# Patient Record
Sex: MALE | Race: WHITE | Employment: OTHER | URBAN - METROPOLITAN AREA
[De-identification: names, ages, dates, MRNs, and addresses within clinical notes are randomized per-mention and may not be internally consistent; named-entity substitution may affect disease eponyms.]

---

## 2017-01-16 RX ORDER — TRIAMTERENE AND HYDROCHLOROTHIAZIDE 37.5; 25 MG/1; MG/1
CAPSULE ORAL
Qty: 90 CAP | Refills: 0 | Status: SHIPPED | OUTPATIENT
Start: 2017-01-16 | End: 2017-03-21 | Stop reason: SDUPTHER

## 2017-01-19 RX ORDER — TRIAMTERENE AND HYDROCHLOROTHIAZIDE 37.5; 25 MG/1; MG/1
CAPSULE ORAL
Qty: 90 CAP | Refills: 0 | Status: SHIPPED | OUTPATIENT
Start: 2017-01-19 | End: 2017-04-05 | Stop reason: SDUPTHER

## 2017-01-31 RX ORDER — ZOLPIDEM TARTRATE 10 MG/1
TABLET ORAL
Qty: 30 TAB | Refills: 0 | OUTPATIENT
Start: 2017-01-31 | End: 2017-03-01 | Stop reason: SDUPTHER

## 2017-02-06 RX ORDER — ATORVASTATIN CALCIUM 20 MG/1
TABLET, FILM COATED ORAL
Qty: 90 TAB | Refills: 0 | Status: SHIPPED | OUTPATIENT
Start: 2017-02-06 | End: 2017-02-07 | Stop reason: SDUPTHER

## 2017-02-07 RX ORDER — ATORVASTATIN CALCIUM 20 MG/1
TABLET, FILM COATED ORAL
Qty: 90 TAB | Refills: 0 | Status: SHIPPED | OUTPATIENT
Start: 2017-02-07 | End: 2017-05-04 | Stop reason: SDUPTHER

## 2017-03-01 RX ORDER — ZOLPIDEM TARTRATE 10 MG/1
TABLET ORAL
Qty: 30 TAB | Refills: 0 | OUTPATIENT
Start: 2017-03-01 | End: 2017-04-01 | Stop reason: SDUPTHER

## 2017-03-09 DIAGNOSIS — E78.5 HYPERLIPIDEMIA LDL GOAL <100: ICD-10-CM

## 2017-03-09 DIAGNOSIS — E11.9 TYPE 2 DIABETES MELLITUS WITHOUT COMPLICATION, WITHOUT LONG-TERM CURRENT USE OF INSULIN (HCC): ICD-10-CM

## 2017-03-14 ENCOUNTER — HOSPITAL ENCOUNTER (OUTPATIENT)
Dept: LAB | Age: 72
Discharge: HOME | End: 2017-03-14

## 2017-03-14 PROCEDURE — 99001 SPECIMEN HANDLING PT-LAB: CPT | Performed by: INTERNAL MEDICINE

## 2017-03-15 LAB
ALBUMIN SERPL-MCNC: 4.3 G/DL (ref 3.5–4.8)
ALBUMIN/GLOB SERPL: 1.7 {RATIO} (ref 1.2–2.2)
ALP SERPL-CCNC: 48 IU/L (ref 39–117)
ALT SERPL-CCNC: 17 IU/L (ref 0–44)
AST SERPL-CCNC: 12 IU/L (ref 0–40)
BILIRUB SERPL-MCNC: 0.5 MG/DL (ref 0–1.2)
BUN SERPL-MCNC: 24 MG/DL (ref 8–27)
BUN/CREAT SERPL: 26 (ref 10–22)
CALCIUM SERPL-MCNC: 9.8 MG/DL (ref 8.6–10.2)
CHLORIDE SERPL-SCNC: 99 MMOL/L (ref 96–106)
CHOLEST SERPL-MCNC: 144 MG/DL (ref 100–199)
CO2 SERPL-SCNC: 25 MMOL/L (ref 18–29)
CREAT SERPL-MCNC: 0.92 MG/DL (ref 0.76–1.27)
EST. AVERAGE GLUCOSE BLD GHB EST-MCNC: 163 MG/DL
GLOBULIN SER CALC-MCNC: 2.6 G/DL (ref 1.5–4.5)
GLUCOSE SERPL-MCNC: 145 MG/DL (ref 65–99)
HBA1C MFR BLD: 7.3 % (ref 4.8–5.6)
HDLC SERPL-MCNC: 44 MG/DL
INTERPRETATION, 910389: NORMAL
LDLC SERPL CALC-MCNC: 79 MG/DL (ref 0–99)
POTASSIUM SERPL-SCNC: 4.9 MMOL/L (ref 3.5–5.2)
PROT SERPL-MCNC: 6.9 G/DL (ref 6–8.5)
SODIUM SERPL-SCNC: 143 MMOL/L (ref 134–144)
TRIGL SERPL-MCNC: 105 MG/DL (ref 0–149)
VLDLC SERPL CALC-MCNC: 21 MG/DL (ref 5–40)

## 2017-03-21 ENCOUNTER — OFFICE VISIT (OUTPATIENT)
Dept: INTERNAL MEDICINE CLINIC | Age: 72
End: 2017-03-21

## 2017-03-21 VITALS
SYSTOLIC BLOOD PRESSURE: 136 MMHG | TEMPERATURE: 97.7 F | OXYGEN SATURATION: 98 % | DIASTOLIC BLOOD PRESSURE: 80 MMHG | HEIGHT: 72 IN | HEART RATE: 62 BPM | RESPIRATION RATE: 12 BRPM | BODY MASS INDEX: 33.4 KG/M2 | WEIGHT: 246.6 LBS

## 2017-03-21 DIAGNOSIS — I10 ESSENTIAL HYPERTENSION WITH GOAL BLOOD PRESSURE LESS THAN 140/90: ICD-10-CM

## 2017-03-21 DIAGNOSIS — E11.9 TYPE 2 DIABETES MELLITUS WITHOUT COMPLICATION, WITHOUT LONG-TERM CURRENT USE OF INSULIN (HCC): Primary | ICD-10-CM

## 2017-03-21 DIAGNOSIS — E78.5 HYPERLIPIDEMIA LDL GOAL <100: ICD-10-CM

## 2017-03-21 NOTE — PROGRESS NOTES
Rosette Heaton 1945, is a 80 y.o. male, who is seen today for reevaluation of diabetes hyperlipidemia hypertension obesity. He feels well. He is following his diet quite well with avoidance of sweets and is eating reasonable amounts of starch. He takes his medicine regularly. Past Medical History:   Diagnosis Date    Diabetes ([facility] [address]     HLD (hyperlipidemia)     Hypertension     Obesity     Sleep apnea      Current Outpatient Prescriptions   Medication Sig Dispense Refill    zolpidem (AMBIEN) 10 mg tablet TAKE ONE TABLET BY MOUTH NIGHTLY. MAX  DAILY  AMOUNT  10MG 30 Tab 0    atorvastatin (LIPITOR) 20 mg tablet TAKE ONE TABLET BY MOUTH ONCE DAILY IN THE EVENING 90 Tab 0    triamterene-hydroCHLOROthiazide (DYAZIDE) 37.5-25 mg per capsule TAKE ONE CAPSULE BY MOUTH ONCE DAILY 90 Cap 0    metFORMIN ER (GLUCOPHAGE XR) 500 mg tablet 2 tablets by mouth twice daily with meals 360 Tab 3    glucose blood VI test strips (ONETOUCH ULTRA TEST) strip Pt to test blood sugar once daily. Dx: E11.9  Dispense # 100 100 Strip 3    Lancets (ONE TOUCH ULTRASOFT LANCETS) Misc Patient tests one to two times daily or as directed by physician. Dx: 250.00 1 Package 11     Visit Vitals    /80    Pulse 62    Temp 97.7 °F (36.5 °C) (Oral)    Resp 12    Ht 6' (1.829 m)    Wt 246 lb 9.6 oz (111.9 kg)    SpO2 98%    BMI 33.44 kg/m2     Carotids are 2+ without bruits. Lungs are clear to percussion. Good breath sounds with no wheezing or crackles. Heart reveals a regular rhythm with normal S1 and S2 no murmur gallop click or rub. Apical impulse is not palpable. Abdomen is obese soft nontender with no hepatosplenomegaly or masses and no bruits. Extremities reveal no clubbing cyanosis or edema. Pulses are 2+.     Results for orders placed or performed in visit on 11/20/54   METABOLIC PANEL, COMPREHENSIVE   Result Value Ref Range    Glucose 145 (H) 65 - 99 mg/dL    BUN 24 8 - 27 mg/dL    Creatinine 0.92 0.76 - 1.27 mg/dL    GFR est non-AA 84 >59 mL/min/1.73    GFR est AA 97 >59 mL/min/1.73    BUN/Creatinine ratio 26 (H) 10 - 22    Sodium 143 134 - 144 mmol/L    Potassium 4.9 3.5 - 5.2 mmol/L    Chloride 99 96 - 106 mmol/L    CO2 25 18 - 29 mmol/L    Calcium 9.8 8.6 - 10.2 mg/dL    Protein, total 6.9 6.0 - 8.5 g/dL    Albumin 4.3 3.5 - 4.8 g/dL    GLOBULIN, TOTAL 2.6 1.5 - 4.5 g/dL    A-G Ratio 1.7 1.2 - 2.2    Bilirubin, total 0.5 0.0 - 1.2 mg/dL    Alk. phosphatase 48 39 - 117 IU/L    AST (SGOT) 12 0 - 40 IU/L    ALT (SGPT) 17 0 - 44 IU/L   LIPID PANEL   Result Value Ref Range    Cholesterol, total 144 100 - 199 mg/dL    Triglyceride 105 0 - 149 mg/dL    HDL Cholesterol 44 >39 mg/dL    VLDL, calculated 21 5 - 40 mg/dL    LDL, calculated 79 0 - 99 mg/dL   HEMOGLOBIN A1C   Result Value Ref Range    Hemoglobin A1c 7.3 (H) 4.8 - 5.6 %    Estimated average glucose 163 mg/dL   CVD REPORT   Result Value Ref Range    INTERPRETATION Note      Assessment: #1.  Diabetes better controlled. He will continue diabetic diet and work on weight loss. He will continue metformin ER 1000 mg twice daily. #2. Hyperlipidemia doing well. He will continue atorvastatin 20 mg each evening. #3. Hypertension controlled, he will continue Dyazide 1 daily. #4.  Obesity with body mass index 33.4, he is to continue working on weight loss through diet and any activity that he is able to continue to do. #5. Insomnia doing well, he will continue zolpidem 10 mg as needed. #6.  Obstructive sleep apnea doing well with CPAP. Follow-up 3 months with complete lab, we will do a complete evaluation/physical in a 15 minute slot. Eron Castle MD FACP    Please note: This document has been produced using voice recognition software. Unrecognized errors in transcription may be present.

## 2017-03-21 NOTE — MR AVS SNAPSHOT
Visit Information Date & Time Provider Department Dept. Phone Encounter #  
 3/21/2017  8:30 AM Ang Lobato MD Internist of [address] [place] [address] [id number] Your Appointments 6/23/2017  9:45 AM  
LAB with [facility] NURSE VISIT Internist of [facility] ([facility]) [address], [address] [facility] [address]  
  
   
 [address], [address]  
  
    
 6/27/2017  8:15 AM  
Office Visit with Ang Lobato MD  
Internist of [address] [place] [address] [facility] Appt Note: 3 month f/u  
 [address] [facility], [address] [address] [address]  
  
   
 [address], [address] Upcoming Health Maintenance Date Due DTaP/Tdap/Td series (1 - Tdap) 8/21/1967 MEDICARE YEARLY EXAM 2/23/2017 FOOT EXAM Q1 2/23/2017 MICROALBUMIN Q1 5/16/2017 EYE EXAM RETINAL OR DILATED Q1 9/6/2017 HEMOGLOBIN A1C Q6M 9/14/2017 LIPID PANEL Q1 3/14/2018 COLONOSCOPY 4/30/2018 GLAUCOMA SCREENING Q2Y 9/6/2018 Allergies as of 3/21/2017  Review Complete On: 3/21/2017 By: Ang Lobato MD  
  
 Severity Noted Reaction Type Reactions Ace Inhibitors Medium  Side Effect Cough Current Immunizations  Reviewed on 12/16/2016 Name Date Influenza High Dose Vaccine PF 9/6/2016, 10/1/2015 Influenza Vaccine 10/6/2014, 10/6/2014, 10/1/2013 Pneumococcal Polysaccharide (PPSV-23) 2/10/2014 Zoster Vaccine, Live 8/11/2014  9:04 AM  
  
 Not reviewed this visit You Were Diagnosed With   
  
 Codes Comments Type 2 diabetes mellitus without complication, without long-term current use of insulin (HCC)    -  Primary ICD-10-CM: E11.9 ICD-9-CM: 250.00 Essential hypertension with goal blood pressure less than 140/90     ICD-10-CM: I10 
ICD-9-CM: 401.9 Hyperlipidemia LDL goal <100     ICD-10-CM: E78.5 ICD-9-CM: 272.4 Vitals BP Pulse Temp Resp Height(growth percentile) Weight(growth percentile) 136/80 62 97.7 °F (36.5 °C) (Oral) 12 6' (1.829 m) 246 lb 9.6 oz (111.9 kg) SpO2 BMI Smoking Status 98% 33.44 kg/m2 Former Smoker Vitals History BMI and BSA Data Body Mass Index Body Surface Area  
 33.44 kg/m 2 2.38 m 2 Preferred Pharmacy Pharmacy Name Phone [facility] [address] [place], [address] Your Updated Medication List  
  
   
This list is accurate as of: 3/21/17  9:06 AM.  Always use your most recent med list.  
  
  
  
  
 atorvastatin 20 mg tablet Commonly known as:  LIPITOR  
TAKE ONE TABLET BY MOUTH ONCE DAILY IN THE EVENING  
  
 glucose blood VI test strips strip Commonly known as:  ONETOUCH ULTRA TEST Pt to test blood sugar once daily. Dx: E11.9  Dispense # 100 Lancets Misc Commonly known as:  ONETOUCH ULTRASOFT LANCETS Patient tests one to two times daily or as directed by physician. Dx: 250.00  
  
 metFORMIN  mg tablet Commonly known as:  GLUCOPHAGE XR  
2 tablets by mouth twice daily with meals  
  
 triamterene-hydroCHLOROthiazide 37.5-25 mg per capsule Commonly known as:  Darene [place] TAKE ONE CAPSULE BY MOUTH ONCE DAILY  
  
 zolpidem 10 mg tablet Commonly known as:  AMBIEN  
TAKE ONE TABLET BY MOUTH NIGHTLY. MAX  DAILY  AMOUNT  10MG To-Do List   
 Around 06/14/2017 Lab:  CBC WITH AUTOMATED DIFF Around 06/14/2017 Lab:  HEMOGLOBIN A1C WITH EAG Around 06/14/2017 Lab:  LIPID PANEL Around 06/14/2017 Lab:  METABOLIC PANEL, COMPREHENSIVE Around 06/14/2017 Lab:  MICROALBUMIN, UR, RAND W/ MICROALBUMIN/CREA RATIO Around 06/14/2017 Lab:  T4, FREE Around 06/14/2017 Lab:  TSH 3RD GENERATION Around 06/14/2017 Lab:  URINALYSIS W/ RFLX MICROSCOPIC Introducing hospitals & HEALTH SERVICES! Dear Flo Johnson: Thank you for requesting a [employer] account. Our records indicate that you already have an active [employer] account. You can access your account anytime at [email or web address] Did you know that you can access your hospital and ER discharge instructions at any time in [employer]? You can also review all of your test results from your hospital stay or ER visit. Additional Information If you have questions, please visit the Frequently Asked Questions section of the [employer] website at [email or web address] Remember, [employer] is NOT to be used for urgent needs. For medical emergencies, dial 911. Now available from your iPhone and Android! Please provide this summary of care documentation to your next provider. Your primary care clinician is listed as Morene Hodgkin. Caridad Hoar. If you have any questions after today's visit, please call [phone].

## 2017-04-03 RX ORDER — ZOLPIDEM TARTRATE 10 MG/1
TABLET ORAL
Qty: 30 TAB | Refills: 0 | OUTPATIENT
Start: 2017-04-03 | End: 2017-04-28 | Stop reason: SDUPTHER

## 2017-04-03 NOTE — TELEPHONE ENCOUNTER
Last filled 3/1/17  Reviewed report generated by the [facility]. Does not demonstrate aberrancies or inconsistencies with regard to the prescribing of controlled medications to this patient by other providers.

## 2017-04-05 RX ORDER — TRIAMTERENE AND HYDROCHLOROTHIAZIDE 37.5; 25 MG/1; MG/1
CAPSULE ORAL
Qty: 90 CAP | Refills: 0 | Status: SHIPPED | OUTPATIENT
Start: 2017-04-05 | End: 2017-10-16 | Stop reason: SDUPTHER

## 2017-04-28 RX ORDER — ZOLPIDEM TARTRATE 10 MG/1
TABLET ORAL
Qty: 30 TAB | Refills: 0 | OUTPATIENT
Start: 2017-04-28 | End: 2017-05-31 | Stop reason: SDUPTHER

## 2017-05-04 RX ORDER — ATORVASTATIN CALCIUM 20 MG/1
TABLET, FILM COATED ORAL
Qty: 90 TAB | Refills: 0 | Status: SHIPPED | OUTPATIENT
Start: 2017-05-04 | End: 2017-08-01 | Stop reason: SDUPTHER

## 2017-05-31 RX ORDER — ZOLPIDEM TARTRATE 10 MG/1
TABLET ORAL
Qty: 30 TAB | Refills: 0 | OUTPATIENT
Start: 2017-05-31 | End: 2017-07-05 | Stop reason: SDUPTHER

## 2017-05-31 NOTE — TELEPHONE ENCOUNTER
Pt is out of town and is in need of refill. Needs this to go to [place] which I have added to his demographics.

## 2017-06-14 DIAGNOSIS — E11.9 TYPE 2 DIABETES MELLITUS WITHOUT COMPLICATION, WITHOUT LONG-TERM CURRENT USE OF INSULIN (HCC): ICD-10-CM

## 2017-06-14 DIAGNOSIS — I10 ESSENTIAL HYPERTENSION WITH GOAL BLOOD PRESSURE LESS THAN 140/90: ICD-10-CM

## 2017-06-14 DIAGNOSIS — E78.5 HYPERLIPIDEMIA LDL GOAL <100: ICD-10-CM

## 2017-06-14 RX ORDER — METFORMIN HYDROCHLORIDE 500 MG/1
TABLET, EXTENDED RELEASE ORAL
Qty: 360 TAB | Refills: 0 | Status: SHIPPED | OUTPATIENT
Start: 2017-06-14 | End: 2017-09-13 | Stop reason: SDUPTHER

## 2017-06-23 ENCOUNTER — LAB ONLY (OUTPATIENT)
Dept: INTERNAL MEDICINE CLINIC | Age: 72
End: 2017-06-23

## 2017-06-23 ENCOUNTER — HOSPITAL ENCOUNTER (OUTPATIENT)
Dept: LAB | Age: 72
Discharge: HOME | End: 2017-06-23

## 2017-06-23 DIAGNOSIS — E78.5 HYPERLIPIDEMIA LDL GOAL <100: ICD-10-CM

## 2017-06-23 DIAGNOSIS — I10 ESSENTIAL HYPERTENSION WITH GOAL BLOOD PRESSURE LESS THAN 140/90: ICD-10-CM

## 2017-06-23 DIAGNOSIS — E11.9 TYPE 2 DIABETES MELLITUS WITHOUT COMPLICATION, WITHOUT LONG-TERM CURRENT USE OF INSULIN (HCC): Primary | ICD-10-CM

## 2017-06-23 PROCEDURE — 99001 SPECIMEN HANDLING PT-LAB: CPT | Performed by: INTERNAL MEDICINE

## 2017-06-24 LAB
ALBUMIN SERPL-MCNC: 4.3 G/DL (ref 3.5–4.8)
ALBUMIN/CREAT UR: 22.9 MG/G CREAT (ref 0–30)
ALBUMIN/GLOB SERPL: 1.7 {RATIO} (ref 1.2–2.2)
ALP SERPL-CCNC: 55 IU/L (ref 39–117)
ALT SERPL-CCNC: 18 IU/L (ref 0–44)
APPEARANCE UR: ABNORMAL
AST SERPL-CCNC: 15 IU/L (ref 0–40)
BASOPHILS # BLD AUTO: 0 X10E3/UL (ref 0–0.2)
BASOPHILS NFR BLD AUTO: 1 %
BILIRUB SERPL-MCNC: 0.3 MG/DL (ref 0–1.2)
BILIRUB UR QL STRIP: NEGATIVE
BUN SERPL-MCNC: 25 MG/DL (ref 8–27)
BUN/CREAT SERPL: 27 (ref 10–24)
CALCIUM SERPL-MCNC: 9.4 MG/DL (ref 8.6–10.2)
CHLORIDE SERPL-SCNC: 100 MMOL/L (ref 96–106)
CHOLEST SERPL-MCNC: 128 MG/DL (ref 100–199)
CO2 SERPL-SCNC: 24 MMOL/L (ref 18–29)
COLOR UR: YELLOW
CREAT SERPL-MCNC: 0.91 MG/DL (ref 0.76–1.27)
CREAT UR-MCNC: 80.7 MG/DL
EOSINOPHIL # BLD AUTO: 0.3 X10E3/UL (ref 0–0.4)
EOSINOPHIL NFR BLD AUTO: 5 %
ERYTHROCYTE [DISTWIDTH] IN BLOOD BY AUTOMATED COUNT: 14.9 % (ref 12.3–15.4)
EST. AVERAGE GLUCOSE BLD GHB EST-MCNC: 157 MG/DL
GLOBULIN SER CALC-MCNC: 2.6 G/DL (ref 1.5–4.5)
GLUCOSE SERPL-MCNC: 162 MG/DL (ref 65–99)
GLUCOSE UR QL: ABNORMAL
HBA1C MFR BLD: 7.1 % (ref 4.8–5.6)
HCT VFR BLD AUTO: 42.3 % (ref 37.5–51)
HDLC SERPL-MCNC: 45 MG/DL
HGB BLD-MCNC: 14 G/DL (ref 12.6–17.7)
HGB UR QL STRIP: NEGATIVE
IMM GRANULOCYTES # BLD: 0 X10E3/UL (ref 0–0.1)
IMM GRANULOCYTES NFR BLD: 0 %
INTERPRETATION, 910389: NORMAL
KETONES UR QL STRIP: NEGATIVE
LDLC SERPL CALC-MCNC: 67 MG/DL (ref 0–99)
LEUKOCYTE ESTERASE UR QL STRIP: NEGATIVE
LYMPHOCYTES # BLD AUTO: 2.5 X10E3/UL (ref 0.7–3.1)
LYMPHOCYTES NFR BLD AUTO: 41 %
Lab: NORMAL
MCH RBC QN AUTO: 29 PG (ref 26.6–33)
MCHC RBC AUTO-ENTMCNC: 33.1 G/DL (ref 31.5–35.7)
MCV RBC AUTO: 88 FL (ref 79–97)
MICRO URNS: ABNORMAL
MICROALBUMIN UR-MCNC: 18.5 UG/ML
MONOCYTES # BLD AUTO: 0.4 X10E3/UL (ref 0.1–0.9)
MONOCYTES NFR BLD AUTO: 7 %
NEUTROPHILS # BLD AUTO: 2.9 X10E3/UL (ref 1.4–7)
NEUTROPHILS NFR BLD AUTO: 46 %
NITRITE UR QL STRIP: NEGATIVE
PH UR STRIP: 6 [PH] (ref 5–7.5)
PLATELET # BLD AUTO: 196 X10E3/UL (ref 150–379)
POTASSIUM SERPL-SCNC: 4.4 MMOL/L (ref 3.5–5.2)
PROT SERPL-MCNC: 6.9 G/DL (ref 6–8.5)
PROT UR QL STRIP: NEGATIVE
RBC # BLD AUTO: 4.83 X10E6/UL (ref 4.14–5.8)
SODIUM SERPL-SCNC: 142 MMOL/L (ref 134–144)
SP GR UR: 1.02 (ref 1–1.03)
T4 FREE SERPL-MCNC: 0.92 NG/DL (ref 0.82–1.77)
TRIGL SERPL-MCNC: 79 MG/DL (ref 0–149)
TSH SERPL DL<=0.005 MIU/L-ACNC: 2.5 UIU/ML (ref 0.45–4.5)
UROBILINOGEN UR STRIP-MCNC: 0.2 MG/DL (ref 0.2–1)
VLDLC SERPL CALC-MCNC: 16 MG/DL (ref 5–40)
WBC # BLD AUTO: 6.1 X10E3/UL (ref 3.4–10.8)

## 2017-06-27 ENCOUNTER — OFFICE VISIT (OUTPATIENT)
Dept: INTERNAL MEDICINE CLINIC | Age: 72
End: 2017-06-27

## 2017-06-27 ENCOUNTER — TELEPHONE (OUTPATIENT)
Dept: INTERNAL MEDICINE CLINIC | Age: 72
End: 2017-06-27

## 2017-06-27 VITALS
RESPIRATION RATE: 14 BRPM | OXYGEN SATURATION: 98 % | WEIGHT: 242.8 LBS | SYSTOLIC BLOOD PRESSURE: 132 MMHG | HEART RATE: 72 BPM | BODY MASS INDEX: 32.89 KG/M2 | DIASTOLIC BLOOD PRESSURE: 60 MMHG | TEMPERATURE: 98.2 F | HEIGHT: 72 IN

## 2017-06-27 DIAGNOSIS — G47.33 OBSTRUCTIVE SLEEP APNEA SYNDROME: ICD-10-CM

## 2017-06-27 DIAGNOSIS — E78.5 HYPERLIPIDEMIA LDL GOAL <100: ICD-10-CM

## 2017-06-27 DIAGNOSIS — E11.9 TYPE 2 DIABETES MELLITUS WITHOUT COMPLICATION, WITHOUT LONG-TERM CURRENT USE OF INSULIN (HCC): ICD-10-CM

## 2017-06-27 DIAGNOSIS — I10 ESSENTIAL HYPERTENSION WITH GOAL BLOOD PRESSURE LESS THAN 140/90: ICD-10-CM

## 2017-06-27 DIAGNOSIS — Z00.00 ROUTINE GENERAL MEDICAL EXAMINATION AT A HEALTH CARE FACILITY: Primary | ICD-10-CM

## 2017-06-27 NOTE — PROGRESS NOTES
Robbie Holman 1945, is a 80 y.o. male, who is seen today for routine physical exam and reevaluation of diabetes hyperlipidemia obesity obstructive sleep apnea hypertension. He feels well in general but he has developed jock itch again. Previously he was treated with antifungal and cortisone by the dermatologist and he would like a refill on that. He has not had a refill in several years. He tries to follow his diet and remains quite active doing some biking and walking. No chest pain or dyspnea. No pain in the legs with walking. He was unable to tolerate CPAP several years ago so he has not tried to do that again. He does sleep for about 4 hours with the help of zolpidem and is generally quite wide awake in the daytime, he never drives very far and never gets sleepy behind the wheel. He falls asleep in his recliner in the evening and then goes to bed a bit later. Past Medical History:   Diagnosis Date    Diabetes ([address]     HLD (hyperlipidemia)     Hypertension     Obesity     Sleep apnea      Past Surgical History:   Procedure Laterality Date    HX COLONOSCOPY  4/30/15    adenomatous polyps, 5 years. Dr. Lars Vanegas HX HEENT      tooth extraction     Current Outpatient Prescriptions   Medication Sig Dispense Refill    metFORMIN ER (GLUCOPHAGE XR) 500 mg tablet TAKE TWO TABLETS BY MOUTH TWICE DAILY WITH  MEALS 360 Tab 0    zolpidem (AMBIEN) 10 mg tablet TAKE ONE TABLET BY MOUTH ONCE NIGHTLY AT BEDTIME 30 Tab 0    atorvastatin (LIPITOR) 20 mg tablet TAKE ONE TABLET BY MOUTH ONCE DAILY IN THE EVENING 90 Tab 0    triamterene-hydroCHLOROthiazide (DYAZIDE) 37.5-25 mg per capsule TAKE ONE CAPSULE BY MOUTH ONCE DAILY 90 Cap 0    glucose blood VI test strips (ONETOUCH ULTRA TEST) strip Pt to test blood sugar once daily. Dx: E11.9  Dispense # 100 100 Strip 3    Lancets (ONE TOUCH ULTRASOFT LANCETS) Misc Patient tests one to two times daily or as directed by physician.  Dx: 250.00 1 Package 11     Allergies   Allergen Reactions    Ace Inhibitors Cough     Social History     Social History    Marital status:      Spouse name: N/A    Number of children: N/A    Years of education: N/A     Social History Main Topics    Smoking status: Former Smoker     Packs/day: 1.00     Years: 12.00     Quit date: 2/1/1978    Smokeless tobacco: Never Used    Alcohol use 0.0 oz/week     1 - 2 Cans of beer per week    Drug use: No    Sexual activity: Yes     Partners: Female     Other Topics Concern    None     Social History Narrative     Visit Vitals    /60    Pulse 72    Temp 98.2 °F (36.8 °C) (Oral)    Resp 14    Ht 6' (1.829 m)    Wt 242 lb 12.8 oz (110.1 kg)    SpO2 98%    BMI 32.93 kg/m2     The patient is a well-developed, well-nourished male in no apparent distress. HEENT: Pupils are equal and react to light and extraocular movements are full. Ear canals and tympanic membranes appear normal. Oral cavity appears normal with no oral lesions. Neck: Carotids are 2+ without bruits. No adenopathy or thyromegaly. Lungs are clear to percussion. I hear no wheezing, rales or rhonchi. Heart reveals a nondisplaced apical impulse in the fifth interspace at the midclavicular line. Rhythm is regular and no murmur, gallop, click or rub. Abdomen is soft and nontender with no hepatosplenomegaly or masses. No distention or tympany and normoactive bowel sounds. Extremities reveal no clubbing cyanosis or edema. The feet reveal no deformities ulcerations or calluses. Normal sensation to monofilament testing. Pulses are 2+ except absent dorsalis pedis pulses and 1+ posterior tibialis pulses. Normal external genitalia with no hernia. Rectal exam is normal.  The prostate is symmetric and smooth with no nodules. No suspicious skin growths.     Results for orders placed or performed in visit on 06/23/17   CBC WITH AUTOMATED DIFF   Result Value Ref Range    WBC 6.1 3.4 - 10.8 x10E3/uL    RBC 4.83 4.14 - 5.80 x10E6/uL    HGB 14.0 12.6 - 17.7 g/dL    HCT 42.3 37.5 - 51.0 %    MCV 88 79 - 97 fL    MCH 29.0 26.6 - 33.0 pg    MCHC 33.1 31.5 - 35.7 g/dL    RDW 14.9 12.3 - 15.4 %    PLATELET 723 487 - 005 x10E3/uL    NEUTROPHILS 46 %    Lymphocytes 41 %    MONOCYTES 7 %    EOSINOPHILS 5 %    BASOPHILS 1 %    ABS. NEUTROPHILS 2.9 1.4 - 7.0 x10E3/uL    Abs Lymphocytes 2.5 0.7 - 3.1 x10E3/uL    ABS. MONOCYTES 0.4 0.1 - 0.9 x10E3/uL    ABS. EOSINOPHILS 0.3 0.0 - 0.4 x10E3/uL    ABS. BASOPHILS 0.0 0.0 - 0.2 x10E3/uL    IMMATURE GRANULOCYTES 0 %    ABS. IMM. GRANS. 0.0 0.0 - 0.1 x10E3/uL   HEMOGLOBIN A1C WITH EAG   Result Value Ref Range    Hemoglobin A1c 7.1 (H) 4.8 - 5.6 %    Estimated average glucose 157 mg/dL   LIPID PANEL   Result Value Ref Range    Cholesterol, total 128 100 - 199 mg/dL    Triglyceride 79 0 - 149 mg/dL    HDL Cholesterol 45 >39 mg/dL    VLDL, calculated 16 5 - 40 mg/dL    LDL, calculated 67 0 - 99 mg/dL   METABOLIC PANEL, COMPREHENSIVE   Result Value Ref Range    Glucose 162 (H) 65 - 99 mg/dL    BUN 25 8 - 27 mg/dL    Creatinine 0.91 0.76 - 1.27 mg/dL    GFR est non-AA 85 >59 mL/min/1.73    GFR est AA 98 >59 mL/min/1.73    BUN/Creatinine ratio 27 (H) 10 - 24    Sodium 142 134 - 144 mmol/L    Potassium 4.4 3.5 - 5.2 mmol/L    Chloride 100 96 - 106 mmol/L    CO2 24 18 - 29 mmol/L    Calcium 9.4 8.6 - 10.2 mg/dL    Protein, total 6.9 6.0 - 8.5 g/dL    Albumin 4.3 3.5 - 4.8 g/dL    GLOBULIN, TOTAL 2.6 1.5 - 4.5 g/dL    A-G Ratio 1.7 1.2 - 2.2    Bilirubin, total 0.3 0.0 - 1.2 mg/dL    Alk.  phosphatase 55 39 - 117 IU/L    AST (SGOT) 15 0 - 40 IU/L    ALT (SGPT) 18 0 - 44 IU/L   URINALYSIS W/ RFLX MICROSCOPIC   Result Value Ref Range    Specific Gravity 1.019 1.005 - 1.030    pH (UA) 6.0 5.0 - 7.5    Color Yellow Yellow    Appearance Cloudy (A) Clear    Leukocyte Esterase Negative Negative    Protein Negative Negative/Trace    Glucose 3+ (A) Negative    Ketone Negative Negative    Blood Negative Negative Bilirubin Negative Negative    Urobilinogen 0.2 0.2 - 1.0 mg/dL    Nitrites Negative Negative    Microscopic Examination Comment    MICROALBUMIN, UR, RAND W/ MICROALBUMIN/CREA RATIO   Result Value Ref Range    Creatinine, urine 80.7 Not Estab. mg/dL    Microalbumin, urine 18.5 Not Estab. ug/mL    Microalb/Creat ratio (ug/mg creat.) 22.9 0.0 - 30.0 mg/g creat   TSH 3RD GENERATION   Result Value Ref Range    TSH 2.500 0.450 - 4.500 uIU/mL   T4, FREE   Result Value Ref Range    T4, Free 0.92 0.82 - 1.77 ng/dL   CVD REPORT   Result Value Ref Range    INTERPRETATION Note    DIABETES PATIENT EDUCATION   Result Value Ref Range    PDF Image Not applicable      Assessment: #1.  Diabetes controlled. A1c is improved somewhat. Have encouraged him to keep working on weight loss, avoiding sweets in his diet and he will continue metformin 1000 mg twice daily. #2. Hyperlipidemia doing well. He will continue atorvastatin 20 mg each evening. #3. Hypertension is controlled. He will continue Dyazide 1 daily. #4.  Sleep apnea with chronic insomnia doing quite well. He does not wish to go through titration again, he had the feeling that he could not exhale properly because of the pressures and CPAP but feels he is doing well enough not to bother with any further testing or treatment. He will continue zolpidem 10 mg at bedtime. #5.  Obesity down just 2 pounds in a year. Encouraged him to work on weight loss. #6.  Recurrent tinea cruris, mild. Will prescribe the same cream that has worked well for him before. (Iodoquinol 2.5%/ Hydrocortisone 1% cream BID 30 gm). He will be due for an eye exam in September and follow-up here in 3 months with lab. Eron Craven MD FACP    Please note: This document has been produced using voice recognition software. Unrecognized errors in transcription may be present.

## 2017-06-27 NOTE — MR AVS SNAPSHOT
Visit Information Date & Time Provider Department Dept. Phone Encounter #  
 6/27/2017  8:15 AM Yun Turner MD Internist of [address] [id number] Your Appointments 9/22/2017  9:55 AM  
LAB with [facility] NURSE VISIT Internist of [facility]) Appt Note: labs 3mos. rm  
 [address], [address] [place] [facility] [address]  
  
   
 [address], [address]  
  
    
 9/27/2017  8:30 AM  
Office Visit with Yun Turner MD  
Internist of [address] [place] [address] Appt Note: ov 3mo. rm  
 [address], [address] [facility] [address]  
  
   
 [address], [address] Upcoming Health Maintenance Date Due DTaP/Tdap/Td series (1 - Tdap) 8/21/1967 MEDICARE YEARLY EXAM 2/23/2017 INFLUENZA AGE 9 TO ADULT 8/1/2017 EYE EXAM RETINAL OR DILATED Q1 9/6/2017 HEMOGLOBIN A1C Q6M 12/23/2017 COLONOSCOPY 4/30/2018 MICROALBUMIN Q1 6/23/2018 LIPID PANEL Q1 6/23/2018 FOOT EXAM Q1 6/27/2018 GLAUCOMA SCREENING Q2Y 9/6/2018 Allergies as of 6/27/2017  Review Complete On: 6/27/2017 By: Yun Turner MD  
  
 Severity Noted Reaction Type Reactions Ace Inhibitors Medium  Side Effect Cough Current Immunizations  Reviewed on 12/16/2016 Name Date Influenza High Dose Vaccine PF 9/6/2016, 10/1/2015 Influenza Vaccine 10/6/2014, 10/6/2014, 10/1/2013 Pneumococcal Polysaccharide (PPSV-23) 2/10/2014 Zoster Vaccine, Live 8/11/2014  9:04 AM  
  
 Not reviewed this visit You Were Diagnosed With   
  
 Codes Comments Routine general medical examination at a health care facility    -  Primary ICD-10-CM: Z00.00 ICD-9-CM: V70.0 Type 2 diabetes mellitus without complication, without long-term current use of insulin (HCC)     ICD-10-CM: E11.9 ICD-9-CM: 250.00 Hyperlipidemia LDL goal <100     ICD-10-CM: E78.5 ICD-9-CM: 272.4 Essential hypertension with goal blood pressure less than 140/90     ICD-10-CM: I10 
ICD-9-CM: 401.9 Obstructive sleep apnea syndrome     ICD-10-CM: G47.33 
ICD-9-CM: 327.23 Vitals BP Pulse Temp Resp Height(growth percentile) Weight(growth percentile) 132/60 72 98.2 °F (36.8 °C) (Oral) 14 6' (1.829 m) 242 lb 12.8 oz (110.1 kg) SpO2 BMI Smoking Status 98% 32.93 kg/m2 Former Smoker Vitals History BMI and BSA Data Body Mass Index Body Surface Area  
 32.93 kg/m 2 2.36 m 2 Preferred Pharmacy Pharmacy Name Phone [facility] [address] Your Updated Medication List  
  
   
This list is accurate as of: 6/27/17  8:35 AM.  Always use your most recent med list.  
  
  
  
  
 atorvastatin 20 mg tablet Commonly known as:  LIPITOR  
TAKE ONE TABLET BY MOUTH ONCE DAILY IN THE EVENING  
  
 glucose blood VI test strips strip Commonly known as:  ONETOUCH ULTRA TEST Pt to test blood sugar once daily. Dx: E11.9  Dispense # 100 Lancets Misc Commonly known as:  ONETOUCH ULTRASOFT LANCETS Patient tests one to two times daily or as directed by physician. Dx: 250.00  
  
 metFORMIN  mg tablet Commonly known as:  GLUCOPHAGE XR  
TAKE TWO TABLETS BY MOUTH TWICE DAILY WITH  MEALS  
  
 triamterene-hydroCHLOROthiazide 37.5-25 mg per capsule Commonly known as:  Macrina [place] TAKE ONE CAPSULE BY MOUTH ONCE DAILY  
  
 zolpidem 10 mg tablet Commonly known as:  AMBIEN  
TAKE ONE TABLET BY MOUTH ONCE NIGHTLY AT BEDTIME To-Do List   
 Around 09/20/2017 Lab:  HEMOGLOBIN A1C WITH EAG Around 09/20/2017 Lab:  LIPID PANEL Around 09/20/2017 Lab:  METABOLIC PANEL, COMPREHENSIVE [facility] & HEALTH SERVICES! Dear Santo Hansen: Thank you for requesting a [employer] account. Our records indicate that you already have an active [employer] account. You can access your account anytime at [email or web address] Did you know that you can access your hospital and ER discharge instructions at any time in [employer]? You can also review all of your test results from your hospital stay or ER visit. Additional Information If you have questions, please visit the Frequently Asked Questions section of the [employer] website at [email or web address] Remember, [employer] is NOT to be used for urgent needs. For medical emergencies, dial 911. Now available from your iPhone and Android! Please provide this summary of care documentation to your next provider. Your primary care clinician is listed as Jun Crouch. If you have any questions after today's visit, please call [phone].

## 2017-07-05 RX ORDER — ZOLPIDEM TARTRATE 10 MG/1
TABLET ORAL
Qty: 30 TAB | Refills: 0 | OUTPATIENT
Start: 2017-07-05 | End: 2017-07-31 | Stop reason: SDUPTHER

## 2017-08-01 RX ORDER — ATORVASTATIN CALCIUM 20 MG/1
TABLET, FILM COATED ORAL
Qty: 90 TAB | Refills: 0 | Status: SHIPPED | OUTPATIENT
Start: 2017-08-01 | End: 2017-10-16 | Stop reason: SDUPTHER

## 2017-08-04 ENCOUNTER — TELEPHONE (OUTPATIENT)
Dept: INTERNAL MEDICINE CLINIC | Age: 72
End: 2017-08-04

## 2017-08-04 RX ORDER — ZOLPIDEM TARTRATE 10 MG/1
TABLET ORAL
Qty: 30 TAB | Refills: 0 | Status: CANCELLED | OUTPATIENT
Start: 2017-08-04

## 2017-08-04 RX ORDER — ZOLPIDEM TARTRATE 10 MG/1
TABLET ORAL
Qty: 30 TAB | Refills: 0 | Status: SHIPPED | OUTPATIENT
Start: 2017-08-04 | End: 2017-09-27 | Stop reason: SDUPTHER

## 2017-08-04 NOTE — TELEPHONE ENCOUNTER
Pt will be going out of town in a few weeks and will be away for awhile- pt refilled rx Ambien on 08/01/2017 is asking if  can send a refill to [address] [place] [address] on hold so [facility] can transfer refill while on vacation

## 2017-09-13 RX ORDER — METFORMIN HYDROCHLORIDE 500 MG/1
500 TABLET, EXTENDED RELEASE ORAL
Qty: 360 TAB | Refills: 0 | Status: SHIPPED | OUTPATIENT
Start: 2017-09-13 | End: 2017-09-27 | Stop reason: CLARIF

## 2017-09-13 RX ORDER — METFORMIN HYDROCHLORIDE 500 MG/1
TABLET, EXTENDED RELEASE ORAL
Qty: 360 TAB | Refills: 0 | Status: SHIPPED | OUTPATIENT
Start: 2017-09-13 | End: 2017-09-13 | Stop reason: SDUPTHER

## 2017-09-20 DIAGNOSIS — E78.5 HYPERLIPIDEMIA LDL GOAL <100: ICD-10-CM

## 2017-09-20 DIAGNOSIS — E11.9 TYPE 2 DIABETES MELLITUS WITHOUT COMPLICATION, WITHOUT LONG-TERM CURRENT USE OF INSULIN (HCC): ICD-10-CM

## 2017-09-22 ENCOUNTER — HOSPITAL ENCOUNTER (OUTPATIENT)
Dept: LAB | Age: 72
Discharge: HOME | End: 2017-09-22

## 2017-09-22 ENCOUNTER — LAB ONLY (OUTPATIENT)
Dept: INTERNAL MEDICINE CLINIC | Age: 72
End: 2017-09-22

## 2017-09-22 DIAGNOSIS — E78.5 HYPERLIPIDEMIA LDL GOAL <100: ICD-10-CM

## 2017-09-22 DIAGNOSIS — E11.9 TYPE 2 DIABETES MELLITUS WITHOUT COMPLICATION, WITHOUT LONG-TERM CURRENT USE OF INSULIN (HCC): Primary | ICD-10-CM

## 2017-09-22 PROCEDURE — 99001 SPECIMEN HANDLING PT-LAB: CPT | Performed by: INTERNAL MEDICINE

## 2017-09-23 LAB
ALBUMIN SERPL-MCNC: 4.1 G/DL (ref 3.5–4.8)
ALBUMIN/GLOB SERPL: 1.5 {RATIO} (ref 1.2–2.2)
ALP SERPL-CCNC: 52 IU/L (ref 39–117)
ALT SERPL-CCNC: 26 IU/L (ref 0–44)
AST SERPL-CCNC: 14 IU/L (ref 0–40)
BILIRUB SERPL-MCNC: 0.5 MG/DL (ref 0–1.2)
BUN SERPL-MCNC: 22 MG/DL (ref 8–27)
BUN/CREAT SERPL: 23 (ref 10–24)
CALCIUM SERPL-MCNC: 9.3 MG/DL (ref 8.6–10.2)
CHLORIDE SERPL-SCNC: 98 MMOL/L (ref 96–106)
CHOLEST SERPL-MCNC: 125 MG/DL (ref 100–199)
CO2 SERPL-SCNC: 25 MMOL/L (ref 18–29)
CREAT SERPL-MCNC: 0.94 MG/DL (ref 0.76–1.27)
EST. AVERAGE GLUCOSE BLD GHB EST-MCNC: 183 MG/DL
GLOBULIN SER CALC-MCNC: 2.7 G/DL (ref 1.5–4.5)
GLUCOSE SERPL-MCNC: 157 MG/DL (ref 65–99)
HBA1C MFR BLD: 8 % (ref 4.8–5.6)
HDLC SERPL-MCNC: 47 MG/DL
INTERPRETATION, 910389: NORMAL
LDLC SERPL CALC-MCNC: 60 MG/DL (ref 0–99)
Lab: NORMAL
POTASSIUM SERPL-SCNC: 4.3 MMOL/L (ref 3.5–5.2)
PROT SERPL-MCNC: 6.8 G/DL (ref 6–8.5)
SODIUM SERPL-SCNC: 139 MMOL/L (ref 134–144)
TRIGL SERPL-MCNC: 89 MG/DL (ref 0–149)
VLDLC SERPL CALC-MCNC: 18 MG/DL (ref 5–40)

## 2017-09-27 ENCOUNTER — TELEPHONE (OUTPATIENT)
Dept: INTERNAL MEDICINE CLINIC | Age: 72
End: 2017-09-27

## 2017-09-27 ENCOUNTER — OFFICE VISIT (OUTPATIENT)
Dept: INTERNAL MEDICINE CLINIC | Age: 72
End: 2017-09-27

## 2017-09-27 VITALS
DIASTOLIC BLOOD PRESSURE: 80 MMHG | WEIGHT: 243.2 LBS | BODY MASS INDEX: 32.94 KG/M2 | TEMPERATURE: 97.8 F | SYSTOLIC BLOOD PRESSURE: 124 MMHG | OXYGEN SATURATION: 98 % | HEART RATE: 72 BPM | HEIGHT: 72 IN | RESPIRATION RATE: 12 BRPM

## 2017-09-27 DIAGNOSIS — Z13.39 SCREENING FOR ALCOHOLISM: ICD-10-CM

## 2017-09-27 DIAGNOSIS — Z00.00 MEDICARE ANNUAL WELLNESS VISIT, SUBSEQUENT: ICD-10-CM

## 2017-09-27 DIAGNOSIS — I10 ESSENTIAL HYPERTENSION WITH GOAL BLOOD PRESSURE LESS THAN 140/90: ICD-10-CM

## 2017-09-27 DIAGNOSIS — E78.5 HYPERLIPIDEMIA LDL GOAL <100: ICD-10-CM

## 2017-09-27 DIAGNOSIS — Z13.31 SCREENING FOR DEPRESSION: ICD-10-CM

## 2017-09-27 DIAGNOSIS — E11.9 TYPE 2 DIABETES MELLITUS WITHOUT COMPLICATION, WITHOUT LONG-TERM CURRENT USE OF INSULIN (HCC): Primary | ICD-10-CM

## 2017-09-27 RX ORDER — ZOLPIDEM TARTRATE 10 MG/1
TABLET ORAL
Qty: 30 TAB | Refills: 2 | Status: SHIPPED | OUTPATIENT
Start: 2017-09-27 | End: 2017-10-16 | Stop reason: SDUPTHER

## 2017-09-27 RX ORDER — METFORMIN HYDROCHLORIDE 500 MG/1
TABLET, EXTENDED RELEASE ORAL
Qty: 360 TAB | Refills: 0
Start: 2017-09-27 | End: 2017-12-27 | Stop reason: SDUPTHER

## 2017-09-27 NOTE — MR AVS SNAPSHOT
Visit Information Date & Time Provider Department Dept. Phone Encounter #  
 9/27/2017  8:30 AM Alexis Centeno MD Internist of [address] [id number] Upcoming Health Maintenance Date Due DTaP/Tdap/Td series (1 - Tdap) 8/21/1966 MEDICARE YEARLY EXAM 2/23/2017 EYE EXAM RETINAL OR DILATED Q1 9/6/2017 HEMOGLOBIN A1C Q6M 3/22/2018 COLONOSCOPY 4/30/2018 MICROALBUMIN Q1 6/23/2018 FOOT EXAM Q1 6/27/2018 GLAUCOMA SCREENING Q2Y 9/6/2018 LIPID PANEL Q1 9/22/2018 Allergies as of 9/27/2017  Review Complete On: 9/27/2017 By: Alexis Centeno MD  
  
 Severity Noted Reaction Type Reactions Ace Inhibitors Medium  Side Effect Cough Current Immunizations  Reviewed on 9/27/2017 Name Date Influenza High Dose Vaccine PF 9/6/2016, 10/1/2015 Influenza Vaccine 8/21/2017, 10/6/2014, 10/6/2014, 10/1/2013 Pneumococcal Polysaccharide (PPSV-23) 2/10/2014 Zoster Vaccine, Live 8/11/2014  9:04 AM  
  
 Reviewed by Alexis Centeno MD on 9/27/2017 at  8:54 AM  
You Were Diagnosed With   
  
 Codes Comments Type 2 diabetes mellitus without complication, without long-term current use of insulin (HCC)    -  Primary ICD-10-CM: E11.9 ICD-9-CM: 250.00 Hyperlipidemia LDL goal <100     ICD-10-CM: E78.5 ICD-9-CM: 272.4 Essential hypertension with goal blood pressure less than 140/90     ICD-10-CM: I10 
ICD-9-CM: 401.9 Medicare annual wellness visit, subsequent     ICD-10-CM: Z00.00 ICD-9-CM: V70.0 Screening for alcoholism     ICD-10-CM: Z13.89 ICD-9-CM: V79.1 Screening for depression     ICD-10-CM: Z13.89 ICD-9-CM: V79.0 Vitals BP Pulse Temp Resp Height(growth percentile) Weight(growth percentile) 124/80 72 97.8 °F (36.6 °C) (Oral) 12 6' (1.829 m) 243 lb 3.2 oz (110.3 kg) SpO2 BMI Smoking Status 98% 32.98 kg/m2 Former Smoker Vitals History BMI and BSA Data Body Mass Index Body Surface Area 32.98 kg/m 2 2.37 m 2 Preferred Pharmacy Pharmacy Name Phone [facility] [address] [place], [address] Your Updated Medication List  
  
   
This list is accurate as of: 9/27/17  9:13 AM.  Always use your most recent med list.  
  
  
  
  
 atorvastatin 20 mg tablet Commonly known as:  LIPITOR  
TAKE ONE TABLET BY MOUTH ONCE DAILY IN THE EVENING  
  
 glucose blood VI test strips strip Commonly known as:  ONETOUCH ULTRA TEST Pt to test blood sugar once daily. Dx: E11.9  Dispense # 100 Lancets Misc Commonly known as:  ONETOUCH ULTRASOFT LANCETS Patient tests one to two times daily or as directed by physician. Dx: 250.00  
  
 metFORMIN  mg tablet Commonly known as:  glucophage XR  
2 tablets by mouth twice daily  
  
 triamterene-hydroCHLOROthiazide 37.5-25 mg per capsule Commonly known as:  Brea Rosin TAKE ONE CAPSULE BY MOUTH ONCE DAILY  
  
 zolpidem 10 mg tablet Commonly known as:  AMBIEN  
TAKE ONE TABLET BY MOUTH ONCE DAILY AT BEDTIME Prescriptions Printed Refills  
 zolpidem (AMBIEN) 10 mg tablet 2 Sig: TAKE ONE TABLET BY MOUTH ONCE DAILY AT BEDTIME Class: Print We Performed the Following [address] [[id number] [facility]] To-Do List   
 Around 12/20/2017 Lab:  HEMOGLOBIN A1C WITH EAG Around 12/20/2017 Lab:  LIPID PANEL Around 12/20/2017 Lab:  METABOLIC PANEL, COMPREHENSIVE Patient Instructions Medicare Part B Preventive Services Limitations Recommendation Follow-up Action Needed Bone Mass Measurement 
(age 72 & older, biennial) Requires diagnosis related to osteoporosis or estrogen deficiency.  Biennial benefit unless patient has history of long-term glucocorticoid tx or baseline is needed because initial test was by other method Last: N/A 
 
 A DEXA scan is recommended after you turn 72years of age to determine your risk for osteoporosis Next: As recommended by provider or specialist   
Colorectal Cancer Screening 
-Fecal occult blood test (annual) -Flexible sigmoidoscopy (5y) 
-Screening colonoscopy (10y) -Barium Enema Usually recommended for patients age 48 - 78  Last: 4/30/2015 Every 5-10 years depending on your colonoscopy result starting at age 48  Next: 5 year follow up (Due April 2020) Glaucoma Screening (no USPSTF recommendation) Diabetes mellitus, family history, , age 48 or over,  American, age 72 or over Last: 9/2016 Every year, or as directed by provider Next: Seeing tomorrow Prostate Cancer Screening (annually up to age 76) - Digital rectal exam (QUENTIN) - Prostate specific antigen (PSA) Annually (age 48 or over), QUENTIN not paid separately when covered E/M service is provided on same date Last: N/A Next: Discuss with your doctor if this screening is appropriate for you. Cardiovascular Screening Blood Tests (every 5 years) Total cholesterol, HDL, Triglycerides Order as a panel if possible Last: Done 9/22/17 Every Year Next: Follow up as recommended by primary care provider and/or specialist  
  
Diabetes Screening Tests (at least every 3 years, Medicare covers annually or at 6-month intervals for prediabetic patients) Fasting blood sugar (FBS) or glucose tolerance test (GTT) Patient must be diagnosed with one of the following: 
-Hypertension, Dyslipidemia, obesity, previous impaired FBS or GTT 
Or any two of the following: overweight, FH of diabetes, age ? 72, history of gestational diabetes, birth of baby weighing more than 9 pounds Last: A1c done 9/22/17 Every 3-6 months depending on your result Every 3 years Next: Follow up in 3-6 months or as recommended by provider Diabetes Self-Management Training (DSMT) (no USPSTF recommendation) Requires referral by treating physician for patient with diabetes or renal disease. 10 hours of initial DSMT session of no less than 30 minutes each in a continuous 12-month period. 2 hours of follow-up DSMT in subsequent years. Last: N/A Talk to your doctor if you are interested in a refresher course. Medical Nutrition Therapy (MNT) (for diabetes or renal disease not recommended schedule) Requires referral by treating physician for patient with diabetes or renal disease. Can be provided in same year as diabetes self-management training (DSMT), and CMS recommends medical nutrition therapy take place after DSMT. Up to 3 hours for initial year and 2 hours in subsequent years. Last: N/A Talk to your doctor if you are interested in a refresher course. Seasonal Influenza Vaccination (annually)  Last: 8/21/2017 Every Fall Due fall 2018 Pneumococcal Vaccination (once after 65)  Last: 
Pneumovax: 2/10/14 Prevnar-13: Due Next: Can get at [facility] - PRECIOUS Shingles Vaccination  Last: 8/2014 A shingles vaccine is recommended once in a lifetime after age 61 Vaccination series complete Tetanus, Diphtheria and Pertussis (Tdap) Vaccination Booster One Booster as an adult and then tetanus every 10 years or as indicated Last: N/A Please consider, especially if you will have frequent contact with young children who have not completed their vaccine series. Can get at your pharmacy. Hepatitis B Vaccinations (if medium/high risk) Medium/high risk factors:  End-stage renal disease, Hemophiliacs who received Factor VIII or IX concentrates, Clients of institutions for the mentally retarded, Persons who live in the same house as a HepB virus carrier, Homosexual men, Illicit injectable drug abusers. Last: N/A Next: N/A Counseling to Prevent Tobacco Use (up to 8 sessions per year) - Counseling greater than 3 and up to 10 minutes - Counseling greater than 10 minutes Patients must be asymptomatic of tobacco-related conditions to receive as preventive service  As recommended by your PCP or Specialist  
Human Immunodeficiency Virus (HIV) Screening (annually for increased risk patients) HIV-1 and HIV-2 by EIA, AMEYA, rapid antibody test, or oral mucosa transudate Patient must be at increased risk for HIV infection per USPSTF guidelines or pregnant. Tests covered annually for patients at increased risk. Pregnant patients may receive up to 3 test during pregnancy. As recommended by your PCP or Specialist  
Ultrasound Screening for Abdominal Aortic Aneurysm (AAA) once Patient must be referred through IPPE and not have had a screening for abdominal aortic aneurysm before under medicare. Limited to patients who meet onf of the following criteria: 
-Men who are 73-68 years old and have smoked more than 100 cigarettes in their lifetime 
-Anyone with a FH of AAA 
-Anyone recommended for screening by the USPSFTF As recommended by your PCP or Specialist 
  
NA = Not Applicable; NI= Not Indicated Advanced care planning: On file with our office, thank you Introducing [facility]! Dear Gris Wu: 
Thank you for requesting a [employer] account. Our records indicate that you already have an active [employer] account. You can access your account anytime at [email or web address] Did you know that you can access your hospital and ER discharge instructions at any time in [employer]? You can also review all of your test results from your hospital stay or ER visit. Additional Information If you have questions, please visit the Frequently Asked Questions section of the [employer] website at [email or web address] Remember, [employer] is NOT to be used for urgent needs. For medical emergencies, dial 911. Now available from your iPhone and Android! Please provide this summary of care documentation to your next provider. Your primary care clinician is listed as Doreen Butler. Essence Huerta. If you have any questions after today's visit, please call [phone].

## 2017-09-27 NOTE — PROGRESS NOTES
Sakina Zaldivar 1945, is a 71 y.o. male, who is seen today for reevaluation of diabetes obesity hyperlipidemia hypertension. He feels generally well but he still only sleeps 3 or 4 hours at night, uses Ambien, he does not tolerate CPAP. Sometimes after doing a lot of yard work first a hour and 1/2-2 hours he will take a nap afterwards but otherwise is awake in the daytime. He is taking his medicine correctly and following his diet quite well. Past Medical History:   Diagnosis Date    Diabetes ([address]     HLD (hyperlipidemia)     Hypertension     Obesity     Sleep apnea      Current Outpatient Prescriptions   Medication Sig Dispense Refill    zolpidem (AMBIEN) 10 mg tablet TAKE ONE TABLET BY MOUTH ONCE DAILY AT BEDTIME 30 Tab 2    metFORMIN ER (GLUCOPHAGE XR) 500 mg tablet 2 tablets by mouth twice daily 360 Tab 0    atorvastatin (LIPITOR) 20 mg tablet TAKE ONE TABLET BY MOUTH ONCE DAILY IN THE EVENING 90 Tab 0    triamterene-hydroCHLOROthiazide (DYAZIDE) 37.5-25 mg per capsule TAKE ONE CAPSULE BY MOUTH ONCE DAILY 90 Cap 0    glucose blood VI test strips (ONETOUCH ULTRA TEST) strip Pt to test blood sugar once daily. Dx: E11.9  Dispense # 100 100 Strip 3    Lancets (ONE TOUCH ULTRASOFT LANCETS) Misc Patient tests one to two times daily or as directed by physician. Dx: 250.00 1 Package 11     Visit Vitals    /80    Pulse 72    Temp 97.8 °F (36.6 °C) (Oral)    Resp 12    Ht 6' (1.829 m)    Wt 243 lb 3.2 oz (110.3 kg)    SpO2 98%    BMI 32.98 kg/m2     Carotids are 2+ without bruits. Lungs are clear to percussion. Good breath sounds with no wheezing or crackles. Heart reveals a regular rhythm with normal S1 and S2 no murmur gallop click or rub. Apical impulse is not palpable. Abdomen is obese soft nontender with no hepatosplenomegaly or masses and no bruits. Extremities reveal no clubbing cyanosis or edema. Pulses are 2+.     Results for orders placed or performed in visit on 64/93/34   METABOLIC PANEL, COMPREHENSIVE   Result Value Ref Range    Glucose 157 (H) 65 - 99 mg/dL    BUN 22 8 - 27 mg/dL    Creatinine 0.94 0.76 - 1.27 mg/dL    GFR est non-AA 81 >59 mL/min/1.73    GFR est AA 94 >59 mL/min/1.73    BUN/Creatinine ratio 23 10 - 24    Sodium 139 134 - 144 mmol/L    Potassium 4.3 3.5 - 5.2 mmol/L    Chloride 98 96 - 106 mmol/L    CO2 25 18 - 29 mmol/L    Calcium 9.3 8.6 - 10.2 mg/dL    Protein, total 6.8 6.0 - 8.5 g/dL    Albumin 4.1 3.5 - 4.8 g/dL    GLOBULIN, TOTAL 2.7 1.5 - 4.5 g/dL    A-G Ratio 1.5 1.2 - 2.2    Bilirubin, total 0.5 0.0 - 1.2 mg/dL    Alk. phosphatase 52 39 - 117 IU/L    AST (SGOT) 14 0 - 40 IU/L    ALT (SGPT) 26 0 - 44 IU/L   LIPID PANEL   Result Value Ref Range    Cholesterol, total 125 100 - 199 mg/dL    Triglyceride 89 0 - 149 mg/dL    HDL Cholesterol 47 >39 mg/dL    VLDL, calculated 18 5 - 40 mg/dL    LDL, calculated 60 0 - 99 mg/dL   HEMOGLOBIN A1C WITH EAG   Result Value Ref Range    Hemoglobin A1c 8.0 (H) 4.8 - 5.6 %    Estimated average glucose 183 mg/dL   CVD REPORT   Result Value Ref Range    INTERPRETATION Note    DIABETES PATIENT EDUCATION   Result Value Ref Range    PDF Image Not applicable      Assessment: #1.  Diabetes with A1c unexpectedly high. His weight is unchanged fasting glucose and change medication and diet unchanged, we will continue metformin 1000 mg twice daily and diabetic diet and recheck in 3 months. He will be having an eye exam tomorrow and have asked him to have the eye doctor fax report to the office here. #2. Hyperlipidemia doing well. He will continue atorvastatin 20 mg each evening. #3.  Obesity unchanged over the last 3 months, have encouraged him to work on weight loss. #4.  Insomnia and sleep apnea, he will continue zolpidem 10 mg at bedtime, he is having no side effects from the medicine. #5. Hypertension is well controlled. He will continue Dyazide 1 daily.     He had a Medicare wellness evaluation today and I agree with Chapito's note. Follow-up in 3 months with [facility] BRIGID Bansal MD FACP    Please note: This document has been produced using voice recognition software. Unrecognized errors in transcription may be present.

## 2017-09-27 NOTE — PROGRESS NOTES
Dr. Osmar Quach  referred Brea Reyes (1945) a 71 y.o. male for a Medicare Annual Wellness Visit ([phone])    This is a Subsequent Medicare Annual Wellness Visit providing Personalized Prevention Plan Services (PPPS) (Performed 12 months after initial AWV and PPPS )    I have reviewed the patient's medical history in detail and updated the computerized patient record. History     Past Medical History:   Diagnosis Date    Diabetes ([address]     HLD (hyperlipidemia)     Hypertension     Obesity     Sleep apnea       Past Surgical History:   Procedure Laterality Date    HX COLONOSCOPY  4/30/15    adenomatous polyps, 5 years. Dr. Amanda Gutierrez HX HEENT      tooth extraction     Current Outpatient Prescriptions   Medication Sig Dispense Refill    zolpidem (AMBIEN) 10 mg tablet TAKE ONE TABLET BY MOUTH ONCE DAILY AT BEDTIME 30 Tab 2    metFORMIN ER (GLUCOPHAGE XR) 500 mg tablet 2 tablets by mouth twice daily 360 Tab 0    atorvastatin (LIPITOR) 20 mg tablet TAKE ONE TABLET BY MOUTH ONCE DAILY IN THE EVENING 90 Tab 0    triamterene-hydroCHLOROthiazide (DYAZIDE) 37.5-25 mg per capsule TAKE ONE CAPSULE BY MOUTH ONCE DAILY 90 Cap 0    glucose blood VI test strips (ONETOUCH ULTRA TEST) strip Pt to test blood sugar once daily. Dx: E11.9  Dispense # 100 100 Strip 3    Lancets (ONE TOUCH ULTRASOFT LANCETS) Misc Patient tests one to two times daily or as directed by physician.  Dx: 250.00 1 Package 11     Allergies   Allergen Reactions    Ace Inhibitors Cough     Family History   Problem Relation Age of Onset    Cancer Mother      ovarian cancer    Diabetes Father     Heart Disease Father     Cancer Maternal Grandfather      unknown type    Cancer Sister      \"everywhere\"     Social History   Substance Use Topics    Smoking status: Former Smoker     Packs/day: 1.00     Years: 12.00     Quit date: 2/1/1978    Smokeless tobacco: Never Used    Alcohol use 0.0 oz/week     1 - 2 Cans of beer per week     Patient Active Problem List   Diagnosis Code    Obesity E66.9    Hyperlipidemia LDL goal <100 E78.5    Essential hypertension with goal blood pressure less than 140/90 I10    Primary insomnia F51.01    Elevated PSA, less than 10 ng/ml R97.20    Type 2 diabetes mellitus without complication, without long-term current use of insulin (HCC) E11.9    Obstructive sleep apnea syndrome G47.33       Depression Risk Factor Screening:     PHQ over the last two weeks 9/27/2017   Little interest or pleasure in doing things Not at all   Feeling down, depressed or hopeless Not at all   Total Score PHQ 2 0     Alcohol Risk Factor Screening: You do not drink alcohol or very rarely. Functional Ability and Level of Safety:     Hearing Loss   Hearing is good. Activities of Daily Living   The home contains: no safety equipment needed by patient   Patient does total self care    Requires assistance with: no ADLs  ADL Assessment 9/27/2017   Feeding yourself No Help Needed   Getting from bed to chair No Help Needed   Getting dressed No Help Needed   Bathing or showering No Help Needed   Walk across the room (includes cane/walker) No Help Needed   Using the telphone No Help Needed   Taking your medications No Help Needed   Preparing meals No Help Needed   Managing money (expenses/bills) No Help Needed   Moderately strenuous housework (laundry) No Help Needed   Shopping for personal items (toiletries/medicines) No Help Needed   Shopping for groceries No Help Needed   Driving No Help Needed   Climbing a flight of stairs No Help Needed   Getting to places beyond walking distances No Help Needed       Fall Risk     Fall Risk Assessment, last 12 mths 9/27/2017   Able to walk? Yes   Fall in past 12 months? No   Fall with injury? -   Number of falls in past 12 months -   Fall Risk Score -     Abuse Screen   Patient is not abused    Abuse Screening Questionnaire 9/27/2017   Do you ever feel afraid of your partner?  N   Are you in a relationship with someone who physically or mentally threatens you? N   Is it safe for you to go home? Y       Cognitive Screening     Evaluation of Cognitive Function:  Has your family/caregiver stated any concerns about your memory: no  Abnormal, Mini Cog test. Patient did score below 3 for the Mini-Cog screening test. Was unable to remember 2 of the 3 words and for the clock draw, the numbers were placed correctly but the hands of the clock were drawn incorrectly when asked for \"ten past eleven\". Did state for the word recall, his wife has commented that he doesn't focus on things when listening which may have contributed to forgetting the list of words. Patient Care Team:  Francisco Silva MD as PCP - General (Internal Medicine)  Marvin Estevez MD (Gastroenterology)  Larita Dakins, RN as Ambulatory Care Navigator (Internal Medicine)  Christiano Gonsalez, JOSE as Ambulatory Care Navigator (Internal Medicine)   Sees Dr Sandor Reyez for diabetic eye screening     Advice/Referrals/Counseling   Education and counseling provided:  Pneumococcal Vaccine  Influenza Vaccine  Colorectal cancer screening tests  Cardiovascular screening blood test  Screening for glaucoma  Diabetes screening test  Tdap / Zoster vaccination recommendations     Assessment/Plan       ICD-10-CM ICD-9-CM    1. Type 2 diabetes mellitus without complication, without long-term current use of insulin (HCC) E11.9 250.00 LIPID PANEL      METABOLIC PANEL, COMPREHENSIVE      HEMOGLOBIN A1C WITH EAG   2. Hyperlipidemia LDL goal <100 E78.5 272.4    3. Essential hypertension with goal blood pressure less than 140/90 I10 401.9    4. Medicare annual wellness visit, subsequent Z00.00 V70.0 [address]   5. Screening for alcoholism Z13.89 V79.1    6. Screening for depression Z13.89 V79.0 [address]   . 7. Medication Reconciliation: Performed today and patient did not bring his medications to the visit.  and the following changes were made. Discontinued: None    Additions: None    Changes / other discrepancies: metformin updated by provider to reflect patient continues to take 1000 mg BID     Medications Discontinued During This Encounter   Medication Reason    zolpidem (AMBIEN) 10 mg tablet Reorder    metFORMIN ER (GLUCOPHAGE XR) 500 mg tablet Error       8. Immunizations: Patient confirmed the following records of vaccinations are correct and current.   -Pneumococcal: Due for Prevnar-13 vaccine. States that he wishes to get vaccine from [facility]. Instructions printed on AVS for patient to get and have records sent to office    -Influenza: Done earlier this year      -Zoster:  Completed series    -Tdap:  Not done, but patient states that he doesn't have regular contact with young children that would require protection from pertussis      Immunization History   Administered Date(s) Administered    Influenza High Dose Vaccine PF 10/01/2015, 09/06/2016    Influenza Vaccine 10/01/2013, 10/06/2014, 10/06/2014, 08/21/2017    Pneumococcal Polysaccharide (PPSV-23) 02/10/2014    Zoster Vaccine, Live 08/11/2014       9. Screenings: (see patient instructions for chart/information):   -Colonoscopy: Done 4/2015, repeat needed in 2020 per report     -Glaucoma screening/Eye exam: Sees eye doctor tomorrow for yearly screening. Asked patient to have records faxed to office    -Lipid panel: Done, follow up per Dr Loretta Spencer   -Diabetes: Done, follow up per Dr Loretta Spencer     10. Abnormal Mini-Cog Screening: Did make Dr. Loretta Spencer aware of abnormal score, may be beneficial to repeat test in the future to see if it could be attributed to more of a concentration issue versus an abnormal screen        11. Advanced Care Planning: Patient currently has advanced directives on file. Patient verbalized understanding of information presented. Answered all of the patient's questions.   AVS information was reviewed with patient and will be printed on checkout.     Nichole ParrD, BCPS

## 2017-09-27 NOTE — PATIENT INSTRUCTIONS
Medicare Part B Preventive Services Limitations Recommendation Follow-up Action Needed    Bone Mass Measurement  (age 72 & older, biennial) Requires diagnosis related to osteoporosis or estrogen deficiency. Biennial benefit unless patient has history of long-term glucocorticoid tx or baseline is needed because initial test was by other method Last: N/A    A DEXA scan is recommended after you turn 72years of age to determine your risk for osteoporosis Next: As recommended by provider or specialist    Colorectal Cancer Screening  -Fecal occult blood test (annual)  -Flexible sigmoidoscopy (5y)  -Screening colonoscopy (10y)  -Barium Enema Usually recommended for patients age 48 - 78  Last: 4/30/2015    Every 5-10 years depending on your colonoscopy result starting at age 48  Next: 5 year follow up (Due April 2020)    Glaucoma Screening (no USPSTF recommendation) Diabetes mellitus, family history, , age 48 or over,  American, age 72 or over Last: 9/2016    Every year, or as directed by provider Next: Seeing tomorrow    Prostate Cancer Screening (annually up to age 76)  - Digital rectal exam (QUENTIN)  - Prostate specific antigen (PSA) Annually (age 48 or over), QUENTIN not paid separately when covered E/M service is provided on same date Last: N/A Next: Discuss with your doctor if this screening is appropriate for you.    Cardiovascular Screening Blood Tests (every 5 years)  Total cholesterol, HDL, Triglycerides Order as a panel if possible Last: Done 9/22/17    Every Year Next: Follow up as recommended by primary care provider and/or specialist      Diabetes Screening Tests (at least every 3 years, Medicare covers annually or at 6-month intervals for prediabetic patients)    Fasting blood sugar (FBS) or glucose tolerance test (GTT) Patient must be diagnosed with one of the following:  -Hypertension, Dyslipidemia, obesity, previous impaired FBS or GTT  Or any two of the following: overweight, FH of diabetes, age ? 72, history of gestational diabetes, birth of baby weighing more than 9 pounds Last: A1c done 9/22/17    Every 3-6 months depending on your result    Every 3 years Next: Follow up in 3-6 months or as recommended by provider    Diabetes Self-Management Training (DSMT) (no USPSTF recommendation) Requires referral by treating physician for patient with diabetes or renal disease. 10 hours of initial DSMT session of no less than 30 minutes each in a continuous 12-month period. 2 hours of follow-up DSMT in subsequent years. Last: N/A Talk to your doctor if you are interested in a refresher course. Medical Nutrition Therapy (MNT) (for diabetes or renal disease not recommended schedule) Requires referral by treating physician for patient with diabetes or renal disease. Can be provided in same year as diabetes self-management training (DSMT), and CMS recommends medical nutrition therapy take place after DSMT. Up to 3 hours for initial year and 2 hours in subsequent years. Last: N/A Talk to your doctor if you are interested in a refresher course. Seasonal Influenza Vaccination (annually)  Last: 8/21/2017    Every Fall Due fall 2018   Pneumococcal Vaccination (once after 72)  Last:  Pneumovax: 2/10/14    Prevnar-13: Due     Next: Can get at [address] Vaccination  Last: 8/2014    A shingles vaccine is recommended once in a lifetime after age 61 Vaccination series complete      Tetanus, Diphtheria and Pertussis (Tdap) Vaccination Booster One Booster as an adult and then tetanus every 10 years or as indicated Last: N/A Please consider, especially if you will have frequent contact with young children who have not completed their vaccine series. Can get at your pharmacy.    Hepatitis B Vaccinations (if medium/high risk) Medium/high risk factors:  End-stage renal disease,  Hemophiliacs who received Factor VIII or IX concentrates, Clients of institutions for the mentally retarded, Persons who live in the same house as a HepB virus carrier, Homosexual men, Illicit injectable drug abusers. Last: N/A Next: N/A   Counseling to Prevent Tobacco Use (up to 8 sessions per year)  - Counseling greater than 3 and up to 10 minutes  - Counseling greater than 10 minutes Patients must be asymptomatic of tobacco-related conditions to receive as preventive service  As recommended by your PCP or Specialist   Human Immunodeficiency Virus (HIV) Screening (annually for increased risk patients)  HIV-1 and HIV-2 by EIA, AMEYA, rapid antibody test, or oral mucosa transudate Patient must be at increased risk for HIV infection per USPSTF guidelines or pregnant. Tests covered annually for patients at increased risk. Pregnant patients may receive up to 3 test during pregnancy. As recommended by your PCP or Specialist   Ultrasound Screening for Abdominal Aortic Aneurysm (AAA) once Patient must be referred through IPPE and not have had a screening for abdominal aortic aneurysm before under medicare. Limited to patients who meet onf of the following criteria:  -Men who are 73-68 years old and have smoked more than 100 cigarettes in their lifetime  -Anyone with a FH of AAA  -Anyone recommended for screening by the USPSFTF    As recommended by your PCP or Specialist     NA = Not Applicable; NI= Not Indicated     Advanced care planning:  On file with our office, thank you

## 2017-10-16 RX ORDER — ATORVASTATIN CALCIUM 20 MG/1
TABLET, FILM COATED ORAL
Qty: 90 TAB | Refills: 0 | Status: SHIPPED | OUTPATIENT
Start: 2017-10-16 | End: 2017-10-16 | Stop reason: SDUPTHER

## 2017-10-16 RX ORDER — ATORVASTATIN CALCIUM 20 MG/1
TABLET, FILM COATED ORAL
Qty: 90 TAB | Refills: 0 | Status: SHIPPED | OUTPATIENT
Start: 2017-10-16 | End: 2018-07-10 | Stop reason: SDUPTHER

## 2017-10-16 RX ORDER — TRIAMTERENE AND HYDROCHLOROTHIAZIDE 37.5; 25 MG/1; MG/1
CAPSULE ORAL
Qty: 90 CAP | Refills: 0 | Status: SHIPPED | OUTPATIENT
Start: 2017-10-16 | End: 2017-10-16 | Stop reason: SDUPTHER

## 2017-10-16 RX ORDER — ZOLPIDEM TARTRATE 10 MG/1
TABLET ORAL
Qty: 30 TAB | Refills: 2 | Status: SHIPPED | OUTPATIENT
Start: 2017-10-16 | End: 2017-12-27 | Stop reason: SDUPTHER

## 2017-10-16 RX ORDER — ZOLPIDEM TARTRATE 10 MG/1
TABLET ORAL
Qty: 30 TAB | Refills: 2 | OUTPATIENT
Start: 2017-10-16 | End: 2017-10-16 | Stop reason: SDUPTHER

## 2017-10-16 RX ORDER — TRIAMTERENE AND HYDROCHLOROTHIAZIDE 37.5; 25 MG/1; MG/1
CAPSULE ORAL
Qty: 90 CAP | Refills: 0 | Status: SHIPPED | OUTPATIENT
Start: 2017-10-16 | End: 2018-03-30 | Stop reason: SDUPTHER

## 2017-10-16 NOTE — TELEPHONE ENCOUNTER
All of these prescriptions were sent to [facility] [place] [facility]. They wont fill them because it is too early. He wants these printed to take with him because he will be out of town when they are due to be filled.

## 2017-10-16 NOTE — TELEPHONE ENCOUNTER
Pt is going away for a few months, he is asking if he can get written scripts for ambien 30 days, dyazide 90 days and lipitor 90 days, he will have to get these while he is away, RM

## 2017-10-23 RX ORDER — TRIAMTERENE AND HYDROCHLOROTHIAZIDE 37.5; 25 MG/1; MG/1
CAPSULE ORAL
Qty: 90 CAP | Refills: 0 | Status: SHIPPED | OUTPATIENT
Start: 2017-10-23 | End: 2017-12-27 | Stop reason: SDUPTHER

## 2017-12-13 RX ORDER — METFORMIN HYDROCHLORIDE 500 MG/1
TABLET, EXTENDED RELEASE ORAL
Qty: 360 TAB | Refills: 0 | Status: SHIPPED | OUTPATIENT
Start: 2017-12-13 | End: 2018-03-12 | Stop reason: SDUPTHER

## 2017-12-15 ENCOUNTER — HOSPITAL ENCOUNTER (OUTPATIENT)
Dept: LAB | Age: 72
Discharge: HOME | End: 2017-12-15

## 2017-12-15 PROCEDURE — 99001 SPECIMEN HANDLING PT-LAB: CPT | Performed by: INTERNAL MEDICINE

## 2017-12-16 LAB
ALBUMIN SERPL-MCNC: 4.6 G/DL (ref 3.5–4.8)
ALBUMIN/GLOB SERPL: 1.8 {RATIO} (ref 1.2–2.2)
ALP SERPL-CCNC: 55 IU/L (ref 39–117)
ALT SERPL-CCNC: 17 IU/L (ref 0–44)
AST SERPL-CCNC: 14 IU/L (ref 0–40)
BILIRUB SERPL-MCNC: 0.6 MG/DL (ref 0–1.2)
BUN SERPL-MCNC: 20 MG/DL (ref 8–27)
BUN/CREAT SERPL: 21 (ref 10–24)
CALCIUM SERPL-MCNC: 9.3 MG/DL (ref 8.6–10.2)
CHLORIDE SERPL-SCNC: 101 MMOL/L (ref 96–106)
CHOLEST SERPL-MCNC: 137 MG/DL (ref 100–199)
CO2 SERPL-SCNC: 25 MMOL/L (ref 18–29)
CREAT SERPL-MCNC: 0.96 MG/DL (ref 0.76–1.27)
EST. AVERAGE GLUCOSE BLD GHB EST-MCNC: 186 MG/DL
GFR SERPLBLD CREATININE-BSD FMLA CKD-EPI: 79 ML/MIN/1.73
GFR SERPLBLD CREATININE-BSD FMLA CKD-EPI: 92 ML/MIN/1.73
GLOBULIN SER CALC-MCNC: 2.5 G/DL (ref 1.5–4.5)
GLUCOSE SERPL-MCNC: 166 MG/DL (ref 65–99)
HBA1C MFR BLD: 8.1 % (ref 4.8–5.6)
HDLC SERPL-MCNC: 43 MG/DL
INTERPRETATION, 910389: NORMAL
LDLC SERPL CALC-MCNC: 75 MG/DL (ref 0–99)
Lab: NORMAL
POTASSIUM SERPL-SCNC: 4.3 MMOL/L (ref 3.5–5.2)
PROT SERPL-MCNC: 7.1 G/DL (ref 6–8.5)
SODIUM SERPL-SCNC: 142 MMOL/L (ref 134–144)
TRIGL SERPL-MCNC: 94 MG/DL (ref 0–149)
VLDLC SERPL CALC-MCNC: 19 MG/DL (ref 5–40)

## 2017-12-20 DIAGNOSIS — E11.9 TYPE 2 DIABETES MELLITUS WITHOUT COMPLICATION, WITHOUT LONG-TERM CURRENT USE OF INSULIN (HCC): ICD-10-CM

## 2017-12-27 ENCOUNTER — OFFICE VISIT (OUTPATIENT)
Dept: INTERNAL MEDICINE CLINIC | Age: 72
End: 2017-12-27

## 2017-12-27 VITALS
OXYGEN SATURATION: 96 % | WEIGHT: 244.2 LBS | RESPIRATION RATE: 12 BRPM | BODY MASS INDEX: 33.08 KG/M2 | HEIGHT: 72 IN | HEART RATE: 80 BPM | TEMPERATURE: 97.8 F | DIASTOLIC BLOOD PRESSURE: 72 MMHG | SYSTOLIC BLOOD PRESSURE: 124 MMHG

## 2017-12-27 DIAGNOSIS — F51.04 CHRONIC INSOMNIA: ICD-10-CM

## 2017-12-27 DIAGNOSIS — E11.9 TYPE 2 DIABETES MELLITUS WITHOUT COMPLICATION, WITHOUT LONG-TERM CURRENT USE OF INSULIN (HCC): Primary | ICD-10-CM

## 2017-12-27 DIAGNOSIS — I10 ESSENTIAL HYPERTENSION WITH GOAL BLOOD PRESSURE LESS THAN 140/90: ICD-10-CM

## 2017-12-27 DIAGNOSIS — G47.33 OBSTRUCTIVE SLEEP APNEA SYNDROME: ICD-10-CM

## 2017-12-27 DIAGNOSIS — E78.5 HYPERLIPIDEMIA LDL GOAL <100: ICD-10-CM

## 2017-12-27 RX ORDER — ZOLPIDEM TARTRATE 10 MG/1
TABLET ORAL
Qty: 30 TAB | Refills: 2 | Status: CANCELLED | OUTPATIENT
Start: 2017-12-27

## 2017-12-27 RX ORDER — ZOLPIDEM TARTRATE 10 MG/1
10 TABLET ORAL
Qty: 30 TAB | Refills: 2 | Status: SHIPPED | OUTPATIENT
Start: 2017-12-27 | End: 2018-03-26 | Stop reason: SDUPTHER

## 2017-12-27 RX ORDER — TRIAMTERENE AND HYDROCHLOROTHIAZIDE 37.5; 25 MG/1; MG/1
1 CAPSULE ORAL DAILY
Qty: 90 CAP | Refills: 3 | Status: SHIPPED | OUTPATIENT
Start: 2017-12-27 | End: 2019-01-15 | Stop reason: SDUPTHER

## 2017-12-27 NOTE — MR AVS SNAPSHOT
Visit Information Date & Time Provider Department Dept. Phone Encounter #  
 12/27/2017  8:30 AM Lorena Desir MD [facility] [address] [place] [address]  Your Appointments 3/30/2018  8:15 AM  
Office Visit with Lorena Desir MD  
[facility] [address] [place] [address] [address]) Appt Note: 3 month f/u  
 [address] [facility], [address] [id number] [address] [place]  
  
   
 [address], [address] Upcoming Health Maintenance Date Due DTaP/Tdap/Td series (1 - Tdap) 8/21/1966 COLONOSCOPY 4/30/2018 HEMOGLOBIN A1C Q6M 6/15/2018 MICROALBUMIN Q1 6/23/2018 FOOT EXAM Q1 6/27/2018 MEDICARE YEARLY EXAM 9/28/2018 EYE EXAM RETINAL OR DILATED Q1 9/28/2018 LIPID PANEL Q1 12/15/2018 GLAUCOMA SCREENING Q2Y 9/28/2019 Allergies as of 12/27/2017  Review Complete On: 12/27/2017 By: Lorena Desir MD  
  
 Severity Noted Reaction Type Reactions Ace Inhibitors Medium  Side Effect Cough Current Immunizations  Reviewed on 9/27/2017 Name Date Influenza High Dose Vaccine PF 9/6/2016, 10/1/2015 Influenza Vaccine 8/21/2017, 10/6/2014, 10/6/2014, 10/1/2013 Pneumococcal Conjugate (PCV-13) 9/27/2017 Pneumococcal Polysaccharide (PPSV-23) 2/10/2014 Zoster Vaccine, Live 8/11/2014  9:04 AM  
  
 Not reviewed this visit You Were Diagnosed With   
  
 Codes Comments Type 2 diabetes mellitus without complication, without long-term current use of insulin (HCC)    -  Primary ICD-10-CM: E11.9 ICD-9-CM: 250.00 Obstructive sleep apnea syndrome     ICD-10-CM: G47.33 
ICD-9-CM: 327.23 Essential hypertension with goal blood pressure less than 140/90     ICD-10-CM: I10 
ICD-9-CM: 401.9 Hyperlipidemia LDL goal <100     ICD-10-CM: E78.5 ICD-9-CM: 272.4 Chronic insomnia     ICD-10-CM: F51.04 
ICD-9-CM: 780.52 Vitals BP Pulse Temp Resp Height(growth percentile) Weight(growth percentile) 124/72 80 97.8 °F (36.6 °C) (Oral) 12 6' (1.829 m) 244 lb 3.2 oz (110.8 kg) SpO2 BMI Smoking Status 96% 33.12 kg/m2 Former Smoker Vitals History BMI and BSA Data Body Mass Index Body Surface Area  
 33.12 kg/m 2 2.37 m 2 Preferred Pharmacy Pharmacy Name Phone [facility] [address] [place], [address] Your Updated Medication List  
  
   
This list is accurate as of: 12/27/17  8:58 AM.  Always use your most recent med list.  
  
  
  
  
 atorvastatin 20 mg tablet Commonly known as:  LIPITOR  
TAKE ONE TABLET BY MOUTH ONCE DAILY IN THE EVENING  
  
 glucose blood VI test strips strip Commonly known as:  ONETOUCH ULTRA TEST Pt to test blood sugar once daily. Dx: E11.9  Dispense # 100 Lancets Misc Commonly known as:  ONETOUCH ULTRASOFT LANCETS Patient tests one to two times daily or as directed by physician. Dx: 250.00  
  
 metFORMIN  mg tablet Commonly known as:  GLUCOPHAGE XR  
TAKE TWO TABLETS BY MOUTH TWICE DAILY WITH MEALS  
  
 * triamterene-hydroCHLOROthiazide 37.5-25 mg per capsule Commonly known as:  Anne Lente TAKE ONE CAPSULE BY MOUTH ONCE DAILY * triamterene-hydroCHLOROthiazide 37.5-25 mg per capsule Commonly known as:  Anne Lente Take 1 Cap by mouth daily. zolpidem 10 mg tablet Commonly known as:  AMBIEN Take 1 Tab by mouth nightly as needed for Sleep. Max Daily Amount: 10 mg.  
  
 * Notice: This list has 2 medication(s) that are the same as other medications prescribed for you. Read the directions carefully, and ask your doctor or other care provider to review them with you. Prescriptions Printed Refills  
 zolpidem (AMBIEN) 10 mg tablet 2 Sig: Take 1 Tab by mouth nightly as needed for Sleep. Max Daily Amount: 10 mg.  
 Class: Print  Route: Oral  
  
 Prescriptions Sent to Pharmacy Refills  
 triamterene-hydroCHLOROthiazide (DYAZIDE) 37.5-25 mg per capsule 3 Sig: Take 1 Cap by mouth daily. Class: Normal  
 Pharm[facility]: [address] [facility] [facility] [phone] Route: Oral  
  
Introducing [facility]! Dear Brenna Main: 
Thank you for requesting a [employer] account. Our records indicate that you already have an active GruvIt account. You can access your account anytime at [email or web address] Did you know that you can access your hospital and ER discharge instructions at any time in [employer]vIt? You can also review all of your test results from your hospital stay or ER visit. Additional Information If you have questions, please visit the Frequently Asked Questions section of the GruvIt website at [email or web address] Remember, [employer]vIt is NOT to be used for urgent needs. For medical emergencies, dial 911. Now available from your iPhone and Android! Please provide this summary of care documentation to your next provider. Your primary care clinician is listed as Alejo Lo. If you have any questions after today's visit, please call [phone].

## 2017-12-27 NOTE — PROGRESS NOTES
Arminda Ambrose III,born 1945, is a 71 y.o. male, who is seen today for reevaluation diabetes hyperlipidemia obesity hypertension. He is not really following his diet all that well through the holidays. He has been eating more but continuing to walk and feeling well in general.  He takes his medicine correctly. He does note that he snores worse than ever and has used CPAP in the past but it was leaking around his face with high pressure so he has not used that for quite a while. He cannot remember who he saw in the past for diagnosis of sleep apnea. He also notes that his right knee especially bothersome when he first gets up or when he walks some days and other days it is fine. Past Medical History:   Diagnosis Date    Diabetes ([address]     HLD (hyperlipidemia)     Hypertension     Obesity     Sleep apnea      Current Outpatient Prescriptions   Medication Sig Dispense Refill    triamterene-hydroCHLOROthiazide (DYAZIDE) 37.5-25 mg per capsule Take 1 Cap by mouth daily. 90 Cap 3    zolpidem (AMBIEN) 10 mg tablet Take 1 Tab by mouth nightly as needed for Sleep. Max Daily Amount: 10 mg. 30 Tab 2    metFORMIN ER (GLUCOPHAGE XR) 500 mg tablet TAKE TWO TABLETS BY MOUTH TWICE DAILY WITH MEALS 360 Tab 0    triamterene-hydroCHLOROthiazide (DYAZIDE) 37.5-25 mg per capsule TAKE ONE CAPSULE BY MOUTH ONCE DAILY 90 Cap 0    atorvastatin (LIPITOR) 20 mg tablet TAKE ONE TABLET BY MOUTH ONCE DAILY IN THE EVENING 90 Tab 0    glucose blood VI test strips (ONETOUCH ULTRA TEST) strip Pt to test blood sugar once daily. Dx: E11.9  Dispense # 100 100 Strip 3    Lancets (ONE TOUCH ULTRASOFT LANCETS) Misc Patient tests one to two times daily or as directed by physician. Dx: 250.00 1 Package 11     Visit Vitals    /72    Pulse 80    Temp 97.8 °F (36.6 °C) (Oral)    Resp 12    Ht 6' (1.829 m)    Wt 244 lb 3.2 oz (110.8 kg)    SpO2 96%    BMI 33.12 kg/m2     Carotids are 2+ without bruits.   Lungs are clear to percussion. Good breath sounds with no wheezing or crackles. Heart reveals a regular rhythm with normal S1 and S2 no murmur gallop click or rub. Apical impulse is not palpable. Abdomen is soft and nontender with no hepatosplenomegaly or masses and no bruits. Extremities reveal no clubbing cyanosis or edema. Pulses are 2+. He has good range of motion of his right knee with no crepitation. Good stability of the cruciate and collateral ligaments. Results for orders placed or performed in visit on 76/64/15   METABOLIC PANEL, COMPREHENSIVE   Result Value Ref Range    Glucose 166 (H) 65 - 99 mg/dL    BUN 20 8 - 27 mg/dL    Creatinine 0.96 0.76 - 1.27 mg/dL    GFR est non-AA 79 >59 mL/min/1.73    GFR est AA 92 >59 mL/min/1.73    BUN/Creatinine ratio 21 10 - 24    Sodium 142 134 - 144 mmol/L    Potassium 4.3 3.5 - 5.2 mmol/L    Chloride 101 96 - 106 mmol/L    CO2 25 18 - 29 mmol/L    Calcium 9.3 8.6 - 10.2 mg/dL    Protein, total 7.1 6.0 - 8.5 g/dL    Albumin 4.6 3.5 - 4.8 g/dL    GLOBULIN, TOTAL 2.5 1.5 - 4.5 g/dL    A-G Ratio 1.8 1.2 - 2.2    Bilirubin, total 0.6 0.0 - 1.2 mg/dL    Alk. phosphatase 55 39 - 117 IU/L    AST (SGOT) 14 0 - 40 IU/L    ALT (SGPT) 17 0 - 44 IU/L   LIPID PANEL   Result Value Ref Range    Cholesterol, total 137 100 - 199 mg/dL    Triglyceride 94 0 - 149 mg/dL    HDL Cholesterol 43 >39 mg/dL    VLDL, calculated 19 5 - 40 mg/dL    LDL, calculated 75 0 - 99 mg/dL   CVD REPORT   Result Value Ref Range    INTERPRETATION Note    HEMOGLOBIN A1C WITH EAG   Result Value Ref Range    Hemoglobin A1c 8.1 (H) 4.8 - 5.6 %    Estimated average glucose 186 mg/dL   DIABETES PATIENT EDUCATION   Result Value Ref Range    PDF Image Not applicable      Assessment: #1.  Diabetes still suboptimally controlled. He is to work harder on weight loss over the next few months. He will continue metformin 1000 mg twice daily. #2. Hyperlipidemia doing well but not quite as well.   He will try and do a better job with weight reduction and continue atorvastatin 20 mg each evening. #3. Hypertension is well controlled. He will continue Dyazide 1 daily. #4.  Insomnia, he will continue zolpidem 10 mg when needed. #5.  Obstructive sleep apnea snoring louder, very bothersome to his wife. We will have him see a sleep specialist.  #6.  Right knee pain some days but not others. If this significantly worsens I will have him see an orthopedist as we discussed today. Follow-up in 3 months with [facility] BRIGID Lerner MD FACP    Please note: This document has been produced using voice recognition software. Unrecognized errors in transcription may be present.

## 2018-03-09 ENCOUNTER — OFFICE VISIT (OUTPATIENT)
Dept: PULMONOLOGY | Age: 73
End: 2018-03-09

## 2018-03-09 VITALS
WEIGHT: 242.6 LBS | HEIGHT: 72 IN | TEMPERATURE: 97 F | OXYGEN SATURATION: 97 % | RESPIRATION RATE: 18 BRPM | BODY MASS INDEX: 32.86 KG/M2 | DIASTOLIC BLOOD PRESSURE: 70 MMHG | HEART RATE: 80 BPM | SYSTOLIC BLOOD PRESSURE: 144 MMHG

## 2018-03-09 DIAGNOSIS — E66.9 OBESITY (BMI 30.0-34.9): ICD-10-CM

## 2018-03-09 DIAGNOSIS — G47.19 EXCESSIVE DAYTIME SLEEPINESS: ICD-10-CM

## 2018-03-09 DIAGNOSIS — R06.83 SNORING: Primary | ICD-10-CM

## 2018-03-09 NOTE — PROGRESS NOTES
[facility]ist  Pulmonary, Critical Care, and Sleep Medicine     Office Progress Note- Initial Evaluation      Primary Care Physician: Rose Yepez MD     Reason for Visit:  Evaluation for obstructive sleep apnea (TERESA)      Assessment:  1. Snoring  2. Excessive Daytime Sleepiness (EDS)  3. History of TERESA  4. Hypertension  5. Obesity  6. Diabetes    Discussion:  Mr. Chung Lewis is a 71 y.o. male who has symptoms and exam findings suggestive of a sleep breathing disorder. He does have a history of TERESA and used PAP therapy for several years. However he was never happy with PAP therapy and stopped using the device several years ago. It does sound as though some of his symptoms have improved but not fully resolved with weight loss. Additional comorbidities include: hypertension and diabetes. We have a full discussion about the above. The patient is reluctant to pursue further testing and/or treatment at this time. We discussed that the never PAP devices are better tolerated and we also discussed potential risks of of untreated TERESA. I provided the patient with educational materials on TERESA and out testing facilities. The patient will think on it some more and discuss with his wife. If he desires sleep testing he will contact our office. In regards to his use of Ambien, he has been on this medication for several years and is satisfied with his response. For now can continue. Plan:    · Potential consequences of untreated sleep apnea, and/or excessive daytime sleepiness were discussed with the patient. · Educational materials provided. · Treatment options including CPAP, dental appliance, weight reduction measures, positional therapy, surgeries etc were discussed. · Healthy lifestyle changes to include weight loss and exercise discussed. Patient encouraged to continue with healthy weight loss  · Healthy sleep habits were reviewed and encouraged.   ·  and workplace safety reviewed and discussed as appropriate. Drowsy and/or inattentive driving should be avoided. · Follow up with Primary Care Provider (PCP) as directed and for routine health care maintenance. · Patient to call our clinic if he desires sleep study, otherwise no further sleep follow up indicated at this time. History of Present Illness: Mr. Sheree Cabrera is a 71 y.o. male patient who presents for evaluation of TERESA. He states he presents today upon the urging of his PCP. The history was provided by the patient. The patient reports that he was diagnosed several years ago with TERSEA. He has used 2 different machines through the years. He presents with an older Respironics device that we are not able to interrogate. He has not used his device for several years. He was never fully comfortable with his high pressure requirements but did use his device for several years. At the time of his initial diagnosis of TERESA we weighed between 270-280. Since snf he has lost approximately 40 pounds and now weighs around 240 libs. Occupation:    - Retired ([employer] sales)                       Driving:   Drowsy Driving: is not reported. Motor vehicle accident(s) associated with drowsy driving:is denied by the patient     Snoring: This is a Chronic problem that has been ongoing for years. Snoring can be disruptive to his wife. No reports of witnessed apena    Fatigue: This is a Chronic problem. He initially awakens with energy but fatigue progresses throughout the day. Sunset today is 9/24    Dental: Teeth clenching or grindingis not reported. Naps: are reported and vary throughout the day. Naps are not planned. Mostly occur when he sits down and will spontaneously fall asleep. A typical nap will last 20-30 minutes and are refreshing for the patient. Leg Symptoms/Pain: He does not have unpleasant or crawling sensation in legs or strong urge to move when inactive.      GERD: is not reported. Mood: Upbeat. Denies depression or anxiety. Sleep-Wake History:     Current Sleep Aids:  -Ambien 10mg QHS 2-3 years    Estimates sleeping approximately 6 hours per night/day. He gets into bed at approximately 2400. He takes  Ambien at bedtime. It usually takes up a few minutes to fall asleep after going to bed. From his perspective he awakens briefly but then quickly goes to sleep. He does not gasp awake like he did in prior years. After 3-4 hours he is aware that he  Tosses  and turns throughout the night but he is able to back to bed. Normally he does not get up at night to use the restroom. Awakens to start his day between 2816-1895. Awakens spontaneously. Does not wake up with a morning headache. Denies a dry mouth. He denies symptoms suggestive of cataplexy, sleep paralysis, hypnagogic and/or hypnopompic hallucinations. He denies any reports of sleep talking/ walking, or other parasomnia behaviors    Family Sleep History:  - Both parents heavy snorers      Stop [place] 3/9/2018   Does the patient snore loudly (louder than talking or loud enough to be heard through closed doors)? 1   Does the patient often feel tired, fatigued, or sleepy during the daytime, even after a \"good\" night's sleep? 1   Has anyone ever observed the patient stop breathing during their sleep?  0   Does the patient have or are they being treated for high blood pressure? 1   Is the patient's BMI greater than 35? 0   Is your neck circumference greater than 17 inches (Male) or 16 inches (Female)? 1   Is the patient older than 48? 1   Is the patient male?  1   TERESA Score 6       PHQ over the last two weeks 3/9/2018   Little interest or pleasure in doing things Not at all   Feeling down, depressed or hopeless Not at all   Total Score PHQ 2 0       Hector Scale 3/9/2018   Sitting and Reading 1   Watching TV 2   Sitting, inactive in a public place (e.g. a movie theater or meeting) 0   As a passenger in a car for an hour, without a break 1   Lying down to rest in the afternoon, when circumstances permit 3   Sitting and talking to someone 0   Sitting quietly after lunch without alcohol 1   In a car, while stopped for a few minutes in traffic 1   [place] Sleepiness Score 9        Neck circ. in \"inches\": 17.5    Past Medical History:  Past Medical History:   Diagnosis Date    Diabetes ([address]     HLD (hyperlipidemia)     Hypertension     Obesity     Sleep apnea        Past Surgical History:  Past Surgical History:   Procedure Laterality Date    HX COLONOSCOPY  4/30/15    adenomatous polyps, 5 years. Dr. Jamaica Figueredo    HX HEENT      tooth extraction       Family History:  Family History   Problem Relation Age of Onset    Cancer Mother      ovarian cancer    Diabetes Father     Heart Disease Father     Cancer Maternal Grandfather      unknown type    Cancer Sister      \"everywhere\"       Social History:  Social History   Substance Use Topics    Smoking status: Former Smoker     Packs/day: 1.00     Years: 12.00     Quit date: 2/1/1978    Smokeless tobacco: Never Used    Alcohol use 0.0 oz/week     1 - 2 Cans of beer per week      Caffeine Amount Time of last Intake Comments   Coffee 1-2 cups/morning     Soda Rare     Tea None     Energy Drinks None     Over- the - counter stimulant pills None     Other Substances      Alcohol 0-2/ week- social  Beer   Tobacco None     Drugs None         Medications:  Current Outpatient Prescriptions on File Prior to Visit   Medication Sig Dispense Refill    triamterene-hydroCHLOROthiazide (DYAZIDE) 37.5-25 mg per capsule Take 1 Cap by mouth daily. 90 Cap 3    zolpidem (AMBIEN) 10 mg tablet Take 1 Tab by mouth nightly as needed for Sleep.  Max Daily Amount: 10 mg. 30 Tab 2    metFORMIN ER (GLUCOPHAGE XR) 500 mg tablet TAKE TWO TABLETS BY MOUTH TWICE DAILY WITH MEALS 360 Tab 0    triamterene-hydroCHLOROthiazide (DYAZIDE) 37.5-25 mg per capsule TAKE ONE CAPSULE BY MOUTH ONCE DAILY 90 Cap 0    atorvastatin (LIPITOR) 20 mg tablet TAKE ONE TABLET BY MOUTH ONCE DAILY IN THE EVENING 90 Tab 0    glucose blood VI test strips (ONETOUCH ULTRA TEST) strip Pt to test blood sugar once daily. Dx: E11.9  Dispense # 100 100 Strip 3    Lancets (ONE TOUCH ULTRASOFT LANCETS) Misc Patient tests one to two times daily or as directed by physician. Dx: 250.00 1 Package 11     No current facility-administered medications on file prior to visit. Allergy:  Allergies   Allergen Reactions    Ace Inhibitors Cough       Review of Systems  General ROS: negative for - chills, fatigue, fever, hot flashes, malaise or weight gain, + intentional weight loss  ENT ROS: negative for - epistaxis, headaches, hearing change, nasal congestion, nasal discharge, nasal polyps, oral lesions, sinus pain or sneezing, + tinnitis- does not interfere with sleep  Hematological and Lymphatic ROS: negative for - bleeding problems, blood clots, bruising, jaundice, pallor or swollen lymph nodes  Endocrine ROS: negative for - polydipsia/polyuria, skin changes, temperature intolerance or unexpected weight changes  Respiratory ROS: no cough, shortness of breath, or wheezing  Cardiovascular ROS: no chest pain or dyspnea on exertion  Gastrointestinal ROS: no abdominal pain, change in bowel habits, or black or bloody stools  Genito-Urinary ROS: no dysuria, trouble voiding- urinary stream not as strong as in the past, or hematuria  Musculoskeletal ROS: positive for - joint pain- knees  Neurological ROS: no TIA or stroke symptoms  Dermatological ROS: negative for - pruritus, rash or skin lesion changes   Psychological ROS: negative   Otherwise negative. Physical Exam:  Blood pressure 144/70, pulse 80, temperature 97 °F (36.1 °C), temperature source Oral, resp. rate 18, height 6' (1.829 m), weight 110 kg (242 lb 9.6 oz), SpO2 97 %. on room air, Body mass index is 32.9 kg/(m^2).      General: in no respiratory distress, acyanotic, appears stated age, cooperative, pleasant  HEENT: PERRL, EOMI, throat without erythema or exudate, Tongue- dental indention on tongue, Mallampati's score 2+, Uvula- midline, Tonsils- 1,   Neck: Supple,  no abnormally enlarged lymph nodes, thyroid is not enlarged, non-tender, no JVD, No carotid bruits  Chest: normal  Lungs: moderate air entry, clear to auscultation bilaterally,   Heart: Regular rate and rhythm, S1S2 present, without murmur  Abdomen: Protruberant, bowel sounds normoactive, abdomen is soft without significant tenderness, or guarding  Extremity: negative for cyanosis or clubbing, + trace edema  Skin: Skin color, texture, turgor normal. No rashes or lesions    Data Reviewed:  CBC:   Lab Results   Component Value Date/Time    WBC 6.1 06/23/2017 09:42 AM    HGB 14.0 06/23/2017 09:42 AM    HCT 42.3 06/23/2017 09:42 AM    PLATELET 016 57/81/2959 09:42 AM    MCV 88 06/23/2017 09:42 AM       BMP:   Lab Results   Component Value Date/Time    Sodium 142 12/15/2017 09:28 AM    Potassium 4.3 12/15/2017 09:28 AM    Chloride 101 12/15/2017 09:28 AM    CO2 25 12/15/2017 09:28 AM    Anion gap 8 01/18/2010 10:50 AM    Glucose 166 (H) 12/15/2017 09:28 AM    BUN 20 12/15/2017 09:28 AM    Creatinine 0.96 12/15/2017 09:28 AM    BUN/Creatinine ratio 21 12/15/2017 09:28 AM    GFR est AA 92 12/15/2017 09:28 AM    GFR est non-AA 79 12/15/2017 09:28 AM    Calcium 9.3 12/15/2017 09:28 AM        TSH:  Lab Results   Component Value Date/Time    TSH 2.500 06/23/2017 09:42 AM    TSH 2.250 05/16/2016 09:49 AM    TSH 2.080 02/09/2016 09:07 AM    TSH 2.890 02/06/2015 08:32 AM    TSH 2.280 02/05/2014 09:23 AM       Imaging:  [x]I have personally reviewed the patients radiographs section   No results found for this or any previous visit. No results found for this or any previous visit. Cardiac Echo:     No results found for this or any previous visit.        Historical Sleep Testing Data: N/A        Derick Louis DO, FCCP  Pulmonary, Sleep and Critical Care Medicine

## 2018-03-09 NOTE — PROGRESS NOTES
Mr. Moni Willams has a reminder for a \"due or due soon\" health maintenance. I have asked that he contact his primary care provider for follow-up on this health maintenance. Chief Complaint   Patient presents with    Sleep Problem     1. Have you been to the ER, urgent care clinic since your last visit? Hospitalized since your last visit? No    2. Have you seen or consulted any other health care providers outside of the [address] [place] [address] since your last visit? Include any pap smears or colon screening.  No

## 2018-03-09 NOTE — MR AVS SNAPSHOT
[address] [facility], [address] [address] 
[phone] Patient: Ethel Potts III 
MRN: [id number] Visit Information Date & Time Provider Department Dept. Phone Encounter #  
 3/9/2018  8:30 AM DO [facility] Bernardino Choi [id number] Your Appointments 3/26/2018  9:25 AM  
LAB with IOC NURSE VISIT [facility] [address] [place] [address] ([address]) Appt Note: lab  
 [address], [address] [facility] [address]  
  
   
 [address], [address]  
  
    
 3/30/2018  8:15 AM  
Office Visit with Can Chawla MD  
[facility] [address] [place] [address] [address] [place] [address]) Appt Note: 3 month f/u  
 [address] [facility], [address] [facility] [address]  
  
   
 [address], [address] Upcoming Health Maintenance Date Due DTaP/Tdap/Td series (1 - Tdap) 8/21/1967 Bone Densitometry (Dexa) Screening 8/21/2011 COLONOSCOPY 4/30/2018 HEMOGLOBIN A1C Q6M 6/15/2018 MICROALBUMIN Q1 6/23/2018 FOOT EXAM Q1 6/27/2018 MEDICARE YEARLY EXAM 9/28/2018 EYE EXAM RETINAL OR DILATED Q1 9/28/2018 LIPID PANEL Q1 12/15/2018 GLAUCOMA SCREENING Q2Y 9/28/2019 Allergies as of 3/9/2018  Review Complete On: 3/9/2018 By: Ramos Rossi LPN Severity Noted Reaction Type Reactions Ace Inhibitors Medium  Side Effect Cough Current Immunizations  Reviewed on 9/27/2017 Name Date Influenza High Dose Vaccine PF 9/6/2016, 10/1/2015 Influenza Vaccine 8/21/2017, 10/6/2014, 10/6/2014, 10/1/2013 Pneumococcal Conjugate (PCV-13) 9/27/2017 Pneumococcal Polysaccharide (PPSV-23) 2/10/2014 Zoster Vaccine, Live 8/11/2014  9:04 AM  
  
 Not reviewed this visit Vitals BP Pulse Temp Resp Height(growth percentile) Weight(growth percentile) 144/70 (BP 1 Location: Left arm, BP Patient Position: Sitting) 80 97 °F (36.1 °C) (Oral) 18 6' (1.829 m) 242 lb 9.6 oz (110 kg) SpO2 BMI Smoking Status 97% 32.9 kg/m2 Former Smoker BMI and BSA Data Body Mass Index Body Surface Area 32.9 kg/m 2 2.36 m 2 Preferred Pharmacy Pharmacy Name Phone [facility] [address] [phone] Your Updated Medication List  
  
   
This list is accurate as of 3/9/18  9:26 AM.  Always use your most recent med list.  
  
  
  
  
 atorvastatin 20 mg tablet Commonly known as:  LIPITOR  
TAKE ONE TABLET BY MOUTH ONCE DAILY IN THE EVENING  
  
 glucose blood VI test strips strip Commonly known as:  ONETOUCH ULTRA TEST Pt to test blood sugar once daily. Dx: E11.9  Dispense # 100 Lancets Misc Commonly known as:  ONETOUCH ULTRASOFT LANCETS Patient tests one to two times daily or as directed by physician. Dx: 250.00  
  
 metFORMIN  mg tablet Commonly known as:  GLUCOPHAGE XR  
TAKE TWO TABLETS BY MOUTH TWICE DAILY WITH MEALS  
  
 * triamterene-hydroCHLOROthiazide 37.5-25 mg per capsule Commonly known as:  [place] TAKE ONE CAPSULE BY MOUTH ONCE DAILY * triamterene-hydroCHLOROthiazide 37.5-25 mg per capsule Commonly known as:  [place] Take 1 Cap by mouth daily. zolpidem 10 mg tablet Commonly known as:  AMBIEN Take 1 Tab by mouth nightly as needed for Sleep. Max Daily Amount: 10 mg.  
  
 * Notice: This list has 2 medication(s) that are the same as other medications prescribed for you. Read the directions carefully, and ask your doctor or other care provider to review them with you. Introducing [address]! Dear Mary Cue: 
Thank you for requesting a [employer] account. Our records indicate that you already have an active [employer] account. You can access your account anytime at [email or web address] Did you know that you can access your hospital and ER discharge instructions at any time in [employer]? You can also review all of your test results from your hospital stay or ER visit. Additional Information If you have questions, please visit the Frequently Asked Questions section of the [employer] website at [email or web address] Remember, [employer] is NOT to be used for urgent needs. For medical emergencies, dial 911. Now available from your iPhone and Android! Please provide this summary of care documentation to your next provider. Your primary care clinician is listed as Akilah Martinez. If you have any questions after today's visit, please call [phone].

## 2018-03-12 RX ORDER — METFORMIN HYDROCHLORIDE 500 MG/1
TABLET, EXTENDED RELEASE ORAL
Qty: 360 TAB | Refills: 0 | Status: SHIPPED | OUTPATIENT
Start: 2018-03-12 | End: 2018-06-15 | Stop reason: SDUPTHER

## 2018-03-20 DIAGNOSIS — E11.9 TYPE 2 DIABETES MELLITUS WITHOUT COMPLICATION, WITHOUT LONG-TERM CURRENT USE OF INSULIN (HCC): ICD-10-CM

## 2018-03-26 ENCOUNTER — APPOINTMENT (OUTPATIENT)
Dept: INTERNAL MEDICINE CLINIC | Age: 73
End: 2018-03-26

## 2018-03-26 ENCOUNTER — HOSPITAL ENCOUNTER (OUTPATIENT)
Dept: LAB | Age: 73
Discharge: HOME | End: 2018-03-26

## 2018-03-26 DIAGNOSIS — F51.04 CHRONIC INSOMNIA: ICD-10-CM

## 2018-03-26 PROCEDURE — 99001 SPECIMEN HANDLING PT-LAB: CPT | Performed by: INTERNAL MEDICINE

## 2018-03-26 RX ORDER — ZOLPIDEM TARTRATE 10 MG/1
10 TABLET ORAL
Qty: 30 TAB | Refills: 2 | OUTPATIENT
Start: 2018-03-26 | End: 2018-06-27 | Stop reason: SDUPTHER

## 2018-03-26 NOTE — TELEPHONE ENCOUNTER
PHONE IN RX    [address] [facility] reports the last fill date for Ambien as 02/21/2018 for a 30 d/s. There appears to be no inconsistencies in regards to the prescribing of this medication. Last Visit: 12/27/2017 with MD Michael Ford    Next Appointment: 03/30/2018 with MD Michael Ford   Previous Refill Encounters: 01/27/2017 per MD Michael Ford #30 with 2 refills    Requested Prescriptions     Pending Prescriptions Disp Refills    zolpidem (AMBIEN) 10 mg tablet 30 Tab 2     Sig: Take 1 Tab by mouth nightly as needed for Sleep. Max Daily Amount: 10 mg.

## 2018-03-27 LAB
ALBUMIN SERPL-MCNC: 4.3 G/DL (ref 3.5–4.8)
ALBUMIN/GLOB SERPL: 1.7 {RATIO} (ref 1.2–2.2)
ALP SERPL-CCNC: 52 IU/L (ref 39–117)
ALT SERPL-CCNC: 22 IU/L (ref 0–44)
AST SERPL-CCNC: 16 IU/L (ref 0–40)
BILIRUB SERPL-MCNC: 0.5 MG/DL (ref 0–1.2)
BUN SERPL-MCNC: 20 MG/DL (ref 8–27)
BUN/CREAT SERPL: 23 (ref 10–24)
CALCIUM SERPL-MCNC: 9.4 MG/DL (ref 8.6–10.2)
CHLORIDE SERPL-SCNC: 102 MMOL/L (ref 96–106)
CHOLEST SERPL-MCNC: 135 MG/DL (ref 100–199)
CO2 SERPL-SCNC: 27 MMOL/L (ref 18–29)
CREAT SERPL-MCNC: 0.87 MG/DL (ref 0.76–1.27)
EST. AVERAGE GLUCOSE BLD GHB EST-MCNC: 200 MG/DL
GFR SERPLBLD CREATININE-BSD FMLA CKD-EPI: 100 ML/MIN/1.73
GFR SERPLBLD CREATININE-BSD FMLA CKD-EPI: 87 ML/MIN/1.73
GLOBULIN SER CALC-MCNC: 2.6 G/DL (ref 1.5–4.5)
GLUCOSE SERPL-MCNC: 175 MG/DL (ref 65–99)
HBA1C MFR BLD: 8.6 % (ref 4.8–5.6)
HDLC SERPL-MCNC: 47 MG/DL
INTERPRETATION, 910389: NORMAL
LDLC SERPL CALC-MCNC: 68 MG/DL (ref 0–99)
Lab: NORMAL
POTASSIUM SERPL-SCNC: 4.5 MMOL/L (ref 3.5–5.2)
PROT SERPL-MCNC: 6.9 G/DL (ref 6–8.5)
SODIUM SERPL-SCNC: 142 MMOL/L (ref 134–144)
TRIGL SERPL-MCNC: 98 MG/DL (ref 0–149)
VLDLC SERPL CALC-MCNC: 20 MG/DL (ref 5–40)

## 2018-03-30 ENCOUNTER — OFFICE VISIT (OUTPATIENT)
Dept: INTERNAL MEDICINE CLINIC | Age: 73
End: 2018-03-30

## 2018-03-30 VITALS
HEART RATE: 75 BPM | HEIGHT: 72 IN | WEIGHT: 242.4 LBS | SYSTOLIC BLOOD PRESSURE: 130 MMHG | TEMPERATURE: 97.8 F | OXYGEN SATURATION: 97 % | DIASTOLIC BLOOD PRESSURE: 76 MMHG | BODY MASS INDEX: 32.83 KG/M2 | RESPIRATION RATE: 12 BRPM

## 2018-03-30 DIAGNOSIS — Z11.59 ENCOUNTER FOR HEPATITIS C SCREENING TEST FOR LOW RISK PATIENT: ICD-10-CM

## 2018-03-30 DIAGNOSIS — I10 ESSENTIAL HYPERTENSION WITH GOAL BLOOD PRESSURE LESS THAN 140/90: ICD-10-CM

## 2018-03-30 DIAGNOSIS — F51.04 CHRONIC INSOMNIA: ICD-10-CM

## 2018-03-30 DIAGNOSIS — E78.5 HYPERLIPIDEMIA LDL GOAL <100: ICD-10-CM

## 2018-03-30 DIAGNOSIS — E11.9 TYPE 2 DIABETES MELLITUS WITHOUT COMPLICATION, WITHOUT LONG-TERM CURRENT USE OF INSULIN (HCC): Primary | ICD-10-CM

## 2018-03-30 DIAGNOSIS — R97.20 ELEVATED PSA, LESS THAN 10 NG/ML: ICD-10-CM

## 2018-03-30 NOTE — PROGRESS NOTES
Bertie Lesches III,born 1945, is a 71 y.o. male, who is seen today for reevaluation of diabetes obesity hypertension hyperlipidemia chronic insomnia. He is feeling well. He recently traveled to [facility] and enjoyed that trip. He enjoys sweets. Has lost just 4 pounds from this time last year. He takes his medicine regularly. No chest pain or dyspnea. He sleeps well with zolpidem as needed at bedtime, when he uses this dose he does not have any sleepiness the following day or any problems during the night with excessive sedation. Past Medical History:   Diagnosis Date    Diabetes ([facility]     HLD (hyperlipidemia)     Hypertension     Obesity     Sleep apnea      Current Outpatient Prescriptions   Medication Sig Dispense Refill    zolpidem (AMBIEN) 10 mg tablet Take 1 Tab by mouth nightly as needed for Sleep. Max Daily Amount: 10 mg. 30 Tab 2    metFORMIN ER (GLUCOPHAGE XR) 500 mg tablet TAKE TWO TABLETS BY MOUTH TWICE DAILY WITH MEALS 360 Tab 0    triamterene-hydroCHLOROthiazide (DYAZIDE) 37.5-25 mg per capsule Take 1 Cap by mouth daily. 90 Cap 3    atorvastatin (LIPITOR) 20 mg tablet TAKE ONE TABLET BY MOUTH ONCE DAILY IN THE EVENING 90 Tab 0    glucose blood VI test strips (ONETOUCH ULTRA TEST) strip Pt to test blood sugar once daily. Dx: E11.9  Dispense # 100 100 Strip 3    Lancets (ONE TOUCH ULTRASOFT LANCETS) Misc Patient tests one to two times daily or as directed by physician. Dx: 250.00 1 Package 11     Visit Vitals    /76    Pulse 75    Temp 97.8 °F (36.6 °C) (Oral)    Resp 12    Ht 6' (1.829 m)    Wt 242 lb 6.4 oz (110 kg)    SpO2 97%    BMI 32.88 kg/m2     Repeat blood pressure 146/80. Carotids are 2+ without bruits. Lungs are clear to percussion. Good breath sounds with no wheezing or crackles. Heart reveals a regular rhythm with normal S1 and S2 no murmur gallop click or rub. Apical impulse is not palpable.   Abdomen is slightly obese soft nontender with no hepatosplenomegaly or masses. No bruits. Strong these reveal no clubbing cyanosis or edema. Pulses are 2+. Results for orders placed or performed in visit on 15/77/41   METABOLIC PANEL, COMPREHENSIVE   Result Value Ref Range    Glucose 175 (H) 65 - 99 mg/dL    BUN 20 8 - 27 mg/dL    Creatinine 0.87 0.76 - 1.27 mg/dL    GFR est non-AA 87 >59 mL/min/1.73    GFR est  >59 mL/min/1.73    BUN/Creatinine ratio 23 10 - 24    Sodium 142 134 - 144 mmol/L    Potassium 4.5 3.5 - 5.2 mmol/L    Chloride 102 96 - 106 mmol/L    CO2 27 18 - 29 mmol/L    Calcium 9.4 8.6 - 10.2 mg/dL    Protein, total 6.9 6.0 - 8.5 g/dL    Albumin 4.3 3.5 - 4.8 g/dL    GLOBULIN, TOTAL 2.6 1.5 - 4.5 g/dL    A-G Ratio 1.7 1.2 - 2.2    Bilirubin, total 0.5 0.0 - 1.2 mg/dL    Alk. phosphatase 52 39 - 117 IU/L    AST (SGOT) 16 0 - 40 IU/L    ALT (SGPT) 22 0 - 44 IU/L   LIPID PANEL   Result Value Ref Range    Cholesterol, total 135 100 - 199 mg/dL    Triglyceride 98 0 - 149 mg/dL    HDL Cholesterol 47 >39 mg/dL    VLDL, calculated 20 5 - 40 mg/dL    LDL, calculated 68 0 - 99 mg/dL   CVD REPORT   Result Value Ref Range    INTERPRETATION Note    HEMOGLOBIN A1C WITH EAG   Result Value Ref Range    Hemoglobin A1c 8.6 (H) 4.8 - 5.6 %    Estimated average glucose 200 mg/dL   DIABETES PATIENT EDUCATION   Result Value Ref Range    PDF Image Not applicable      Assessment: #1.  Diabetes less well controlled. I talked to him at length about the importance of cutting back on sweets and total calories and losing weight, much preferable to adding more medication for his diabetic control. He will continue metformin ER 1000 mg twice a day with meals. #2. Hyperlipidemia doing well. He will continue atorvastatin 20 mg each evening. #3. Hypertension is generally well controlled, the reading I got in the right arm as above is higher than with the nurse, when he first came in. That was after I talked to him about his higher glucose.   We will recheck his blood pressure in 3 months. #4.  History of elevated PSA, the last one that was done was normal.  We will recheck PSA in 3 months. #5.  Chronic insomnia, he will continue zolpidem 10 mg at bedtime as needed. He is having no side effects from the medicine. Follow-up in 3 months for complete evaluation    Eron Aguirre MD FACP    Please note: This document has been produced using voice recognition software. Unrecognized errors in transcription may be present.

## 2018-03-30 NOTE — MR AVS SNAPSHOT
[facility] 
 
 
 [address], [facility] 200 [facility] 
[phone] Patient: Annette Etinene III 
MRN: [id number] Visit Information Date & Time Provider Department Dept. Phone Encounter #  
 3/30/2018  8:15 AM Jason Moulton MD [phone] [facility] Your Appointments 7/10/2018  7:45 AM  
PHYSICAL with Jason Moulton MD  
[facility] [address] [facility]) Appt Note: rpe  
 5445 [facility] [address] [address]  
  
   
 [address] [facility] Upcoming Health Maintenance Date Due DTaP/Tdap/Td series (1 - Tdap) 8/21/1967 COLONOSCOPY 4/30/2018 MICROALBUMIN Q1 6/23/2018 FOOT EXAM Q1 6/27/2018 HEMOGLOBIN A1C Q6M 9/26/2018 MEDICARE YEARLY EXAM 9/28/2018 EYE EXAM RETINAL OR DILATED Q1 9/28/2018 LIPID PANEL Q1 3/26/2019 GLAUCOMA SCREENING Q2Y 9/28/2019 Allergies as of 3/30/2018  Review Complete On: 3/30/2018 By: Jason Moulton MD  
  
 Severity Noted Reaction Type Reactions Ace Inhibitors Medium  Side Effect Cough Current Immunizations  Reviewed on 9/27/2017 Name Date Influenza High Dose Vaccine PF 9/6/2016, 10/1/2015 Influenza Vaccine 8/21/2017, 10/6/2014, 10/6/2014, 10/1/2013 Pneumococcal Conjugate (PCV-13) 9/27/2017 Pneumococcal Polysaccharide (PPSV-23) 2/10/2014 Zoster Vaccine, Live 8/11/2014  9:04 AM  
  
 Not reviewed this visit You Were Diagnosed With   
  
 Codes Comments Type 2 diabetes mellitus without complication, without long-term current use of insulin (HCC)    -  Primary ICD-10-CM: E11.9 ICD-9-CM: 250.00 Chronic insomnia     ICD-10-CM: F51.04 
ICD-9-CM: 780.52 Essential hypertension with goal blood pressure less than 140/90     ICD-10-CM: I10 
ICD-9-CM: 401.9 Hyperlipidemia LDL goal <100     ICD-10-CM: E78.5 ICD-9-CM: 272.4 Elevated PSA, less than 10 ng/ml     ICD-10-CM: R97.20 ICD-9-CM: 790.93 Vitals BP Pulse Temp Resp Height(growth percentile) Weight(growth percentile) 130/76 75 97.8 °F (36.6 °C) (Oral) 12 6' (1.829 m) 242 lb 6.4 oz (110 kg) SpO2 BMI Smoking Status 97% 32.88 kg/m2 Former Smoker Vitals History BMI and BSA Data Body Mass Index Body Surface Area  
 32.88 kg/m 2 2.36 m 2 Preferred Pharmacy Pharmacy Name Phone [address] [place] [address], [address] [place] [phone] Your Updated Medication List  
  
   
This list is accurate as of 3/30/18  8:36 AM.  Always use your most recent med list.  
  
  
  
  
 atorvastatin 20 mg tablet Commonly known as:  LIPITOR  
TAKE ONE TABLET BY MOUTH ONCE DAILY IN THE EVENING  
  
 glucose blood VI test strips strip Commonly known as:  ONETOUCH ULTRA TEST Pt to test blood sugar once daily. Dx: E11.9  Dispense # 100 Lancets Misc Commonly known as:  ONETOUCH ULTRASOFT LANCETS Patient tests one to two times daily or as directed by physician. Dx: 250.00  
  
 metFORMIN  mg tablet Commonly known as:  GLUCOPHAGE XR  
TAKE TWO TABLETS BY MOUTH TWICE DAILY WITH MEALS  
  
 triamterene-hydroCHLOROthiazide 37.5-25 mg per capsule Commonly known as:  Ottie Washakie Take 1 Cap by mouth daily. zolpidem 10 mg tablet Commonly known as:  AMBIEN Take 1 Tab by mouth nightly as needed for Sleep. Max Daily Amount: 10 mg. To-Do List   
 Around 07/18/2018 Lab:  CBC WITH AUTOMATED DIFF Around 07/18/2018 Lab:  HEMOGLOBIN A1C WITH EAG Around 07/18/2018 Lab:  LIPID PANEL Around 07/18/2018 Lab:  METABOLIC PANEL, COMPREHENSIVE Around 07/18/2018 Lab:  MICROALBUMIN, UR, RAND W/ MICROALB/CREAT RATIO Around 07/18/2018 Lab:  PSA, DIAGNOSTIC (PROSTATE SPECIFIC AG) Around 07/18/2018   Lab:  T4, FREE   
 Around 07/18/2018 Lab:  TSH 3RD GENERATION Around 07/18/2018 Lab:  URINALYSIS W/ RFLX MICROSCOPIC Introducing [facility] SERVICES! Dear Nadeen Dolan: 
Thank you for requesting a [employer] account. Our records indicate that you already have an active [employer] account. You can access your account anytime at [email or web address] Did you know that you can access your hospital and ER discharge instructions at any time in [employer]? You can also review all of your test results from your hospital stay or ER visit. Additional Information If you have questions, please visit the Frequently Asked Questions section of the [employer] website at [email or web address] Remember, [employer] is NOT to be used for urgent needs. For medical emergencies, dial 911. Now available from your iPhone and Android! Please provide this summary of care documentation to your next provider. Your primary care clinician is listed as Vane Vidal. Sindy Araiza. If you have any questions after today's visit, please call [phone].

## 2018-04-23 RX ORDER — ATORVASTATIN CALCIUM 20 MG/1
TABLET, FILM COATED ORAL
Qty: 90 TAB | Refills: 0 | Status: SHIPPED | OUTPATIENT
Start: 2018-04-23 | End: 2018-07-10 | Stop reason: SDUPTHER

## 2018-06-15 RX ORDER — METFORMIN HYDROCHLORIDE 500 MG/1
1000 TABLET, EXTENDED RELEASE ORAL 2 TIMES DAILY WITH MEALS
Qty: 360 TAB | Refills: 0 | Status: SHIPPED | OUTPATIENT
Start: 2018-06-15 | End: 2018-09-12 | Stop reason: SDUPTHER

## 2018-06-15 NOTE — TELEPHONE ENCOUNTER
Last Visit: 03/30/2018 with MD Hayley Marquis    Next Appointment: 07/10/2018 with MD Hayley Marquis   Previous Refill Encounters: 03/12/2018 per MD Hayley Marquis #[id number]     Requested Prescriptions     Pending Prescriptions Disp Refills    metFORMIN ER (GLUCOPHAGE XR) 500 mg tablet 360 Tab 0     Sig: Take 2 Tabs by mouth two (2) times daily (with meals).

## 2018-06-27 ENCOUNTER — APPOINTMENT (OUTPATIENT)
Dept: INTERNAL MEDICINE CLINIC | Age: 73
End: 2018-06-27

## 2018-06-27 ENCOUNTER — HOSPITAL ENCOUNTER (OUTPATIENT)
Dept: LAB | Age: 73
Discharge: HOME | End: 2018-06-27

## 2018-06-27 DIAGNOSIS — F51.04 CHRONIC INSOMNIA: ICD-10-CM

## 2018-06-27 PROCEDURE — 99001 SPECIMEN HANDLING PT-LAB: CPT | Performed by: INTERNAL MEDICINE

## 2018-06-27 RX ORDER — ZOLPIDEM TARTRATE 10 MG/1
10 TABLET ORAL
Qty: 30 TAB | Refills: 2 | OUTPATIENT
Start: 2018-06-27 | End: 2018-09-23 | Stop reason: SDUPTHER

## 2018-06-28 LAB
ALBUMIN SERPL-MCNC: 4.4 G/DL (ref 3.5–4.8)
ALBUMIN/CREAT UR: 14.1 MG/G CREAT (ref 0–30)
ALBUMIN/GLOB SERPL: 1.8 {RATIO} (ref 1.2–2.2)
ALP SERPL-CCNC: 55 IU/L (ref 39–117)
ALT SERPL-CCNC: 17 IU/L (ref 0–44)
APPEARANCE UR: ABNORMAL
AST SERPL-CCNC: 16 IU/L (ref 0–40)
BASOPHILS # BLD AUTO: 0 X10E3/UL (ref 0–0.2)
BASOPHILS NFR BLD AUTO: 0 %
BILIRUB SERPL-MCNC: 0.6 MG/DL (ref 0–1.2)
BILIRUB UR QL STRIP: NEGATIVE
BUN SERPL-MCNC: 20 MG/DL (ref 8–27)
BUN/CREAT SERPL: 20 (ref 10–24)
CALCIUM SERPL-MCNC: 9.1 MG/DL (ref 8.6–10.2)
CHLORIDE SERPL-SCNC: 105 MMOL/L (ref 96–106)
CHOLEST SERPL-MCNC: 119 MG/DL (ref 100–199)
CO2 SERPL-SCNC: 24 MMOL/L (ref 20–29)
COLOR UR: YELLOW
CREAT SERPL-MCNC: 0.99 MG/DL (ref 0.76–1.27)
CREAT UR-MCNC: 184.8 MG/DL
EOSINOPHIL # BLD AUTO: 0.2 X10E3/UL (ref 0–0.4)
EOSINOPHIL NFR BLD AUTO: 3 %
ERYTHROCYTE [DISTWIDTH] IN BLOOD BY AUTOMATED COUNT: 14.2 % (ref 12.3–15.4)
EST. AVERAGE GLUCOSE BLD GHB EST-MCNC: 192 MG/DL
GLOBULIN SER CALC-MCNC: 2.4 G/DL (ref 1.5–4.5)
GLUCOSE SERPL-MCNC: 161 MG/DL (ref 65–99)
GLUCOSE UR QL: ABNORMAL
HBA1C MFR BLD: 8.3 % (ref 4.8–5.6)
HCT VFR BLD AUTO: 44.4 % (ref 37.5–51)
HCV AB S/CO SERPL IA: <0.1 S/CO RATIO (ref 0–0.9)
HDLC SERPL-MCNC: 41 MG/DL
HGB BLD-MCNC: 14.4 G/DL (ref 13–17.7)
HGB UR QL STRIP: NEGATIVE
IMM GRANULOCYTES # BLD: 0 X10E3/UL (ref 0–0.1)
IMM GRANULOCYTES NFR BLD: 0 %
INTERPRETATION, 910389: NORMAL
KETONES UR QL STRIP: NEGATIVE
LDLC SERPL CALC-MCNC: 56 MG/DL (ref 0–99)
LEUKOCYTE ESTERASE UR QL STRIP: NEGATIVE
LYMPHOCYTES # BLD AUTO: 2.9 X10E3/UL (ref 0.7–3.1)
LYMPHOCYTES NFR BLD AUTO: 42 %
Lab: NORMAL
MCH RBC QN AUTO: 29.6 PG (ref 26.6–33)
MCHC RBC AUTO-ENTMCNC: 32.4 G/DL (ref 31.5–35.7)
MCV RBC AUTO: 91 FL (ref 79–97)
MICRO URNS: ABNORMAL
MICROALBUMIN UR-MCNC: 26 UG/ML
MONOCYTES # BLD AUTO: 0.4 X10E3/UL (ref 0.1–0.9)
MONOCYTES NFR BLD AUTO: 6 %
NEUTROPHILS # BLD AUTO: 3.4 X10E3/UL (ref 1.4–7)
NEUTROPHILS NFR BLD AUTO: 49 %
NITRITE UR QL STRIP: NEGATIVE
PH UR STRIP: 5 [PH] (ref 5–7.5)
PLATELET # BLD AUTO: 195 X10E3/UL (ref 150–379)
POTASSIUM SERPL-SCNC: 4.3 MMOL/L (ref 3.5–5.2)
PROT SERPL-MCNC: 6.8 G/DL (ref 6–8.5)
PROT UR QL STRIP: NEGATIVE
PSA SERPL-MCNC: 3.4 NG/ML (ref 0–4)
RBC # BLD AUTO: 4.87 X10E6/UL (ref 4.14–5.8)
SODIUM SERPL-SCNC: 143 MMOL/L (ref 134–144)
SP GR UR: 1.02 (ref 1–1.03)
TRIGL SERPL-MCNC: 109 MG/DL (ref 0–149)
TSH SERPL DL<=0.005 MIU/L-ACNC: 2.35 UIU/ML (ref 0.45–4.5)
UROBILINOGEN UR STRIP-MCNC: 0.2 MG/DL (ref 0.2–1)
VLDLC SERPL CALC-MCNC: 22 MG/DL (ref 5–40)
WBC # BLD AUTO: 6.9 X10E3/UL (ref 3.4–10.8)

## 2018-07-10 ENCOUNTER — OFFICE VISIT (OUTPATIENT)
Dept: INTERNAL MEDICINE CLINIC | Age: 73
End: 2018-07-10

## 2018-07-10 VITALS
HEART RATE: 68 BPM | SYSTOLIC BLOOD PRESSURE: 130 MMHG | WEIGHT: 240 LBS | OXYGEN SATURATION: 97 % | DIASTOLIC BLOOD PRESSURE: 72 MMHG | HEIGHT: 72 IN | TEMPERATURE: 97.8 F | BODY MASS INDEX: 32.51 KG/M2 | RESPIRATION RATE: 12 BRPM

## 2018-07-10 DIAGNOSIS — E78.5 HYPERLIPIDEMIA LDL GOAL <100: ICD-10-CM

## 2018-07-10 DIAGNOSIS — E11.9 TYPE 2 DIABETES MELLITUS WITHOUT COMPLICATION, WITHOUT LONG-TERM CURRENT USE OF INSULIN (HCC): Primary | ICD-10-CM

## 2018-07-10 DIAGNOSIS — I10 ESSENTIAL HYPERTENSION WITH GOAL BLOOD PRESSURE LESS THAN 140/90: ICD-10-CM

## 2018-07-10 RX ORDER — ATORVASTATIN CALCIUM 20 MG/1
TABLET, FILM COATED ORAL
Qty: 90 TAB | Refills: 3 | Status: SHIPPED | OUTPATIENT
Start: 2018-07-10 | End: 2019-07-22 | Stop reason: SDUPTHER

## 2018-07-10 RX ORDER — ATORVASTATIN CALCIUM 20 MG/1
20 TABLET, FILM COATED ORAL DAILY
Qty: 90 TAB | Refills: 3 | Status: CANCELLED | OUTPATIENT
Start: 2018-07-10

## 2018-07-10 NOTE — PROGRESS NOTES
Jasson Bruno III,born 1945, is a 71 y.o. male, who is seen today for reevaluation of diabetes hyperlipidemia hypertension insomnia obesity. He does note that he is losing some hearing and also has some minor visual disturbance but will be seeing his eye doctor soon. He is doing much better with his diet and now his wife is walking with him and that is helpful for him. He had been 6 weeks in [place] with his wife's relatives and eating a lot of fatty foods but is back on his regular diet. He has no chest pain or dyspnea and takes all of his medicine correctly. Past Medical History:   Diagnosis Date    Diabetes ([address]     HLD (hyperlipidemia)     Hypertension     Obesity     Sleep apnea      Past Surgical History:   Procedure Laterality Date    HX COLONOSCOPY  4/30/15    adenomatous polyps, 5 years. Dr. Bajwa Cancer HX HEENT      tooth extraction     Current Outpatient Prescriptions   Medication Sig Dispense Refill    atorvastatin (LIPITOR) 20 mg tablet TAKE ONE TABLET BY MOUTH ONCE DAILY IN THE EVENING 90 Tab 3    zolpidem (AMBIEN) 10 mg tablet Take 1 Tab by mouth nightly as needed for Sleep. Max Daily Amount: 10 mg. 30 Tab 2    metFORMIN ER (GLUCOPHAGE XR) 500 mg tablet Take 2 Tabs by mouth two (2) times daily (with meals). 360 Tab 0    triamterene-hydroCHLOROthiazide (DYAZIDE) 37.5-25 mg per capsule Take 1 Cap by mouth daily.  90 Cap 3     Allergies   Allergen Reactions    Ace Inhibitors Cough     Social History     Social History    Marital status:      Spouse name: N/A    Number of children: N/A    Years of education: N/A     Social History Main Topics    Smoking status: Former Smoker     Packs/day: 1.00     Years: 12.00     Quit date: 2/1/1978    Smokeless tobacco: Never Used    Alcohol use 0.0 oz/week     1 - 2 Cans of beer per week    Drug use: No    Sexual activity: Yes     Partners: Female     Other Topics Concern    None     Social History Narrative     Visit Vitals    /72    Pulse 68    Temp 97.8 °F (36.6 °C) (Oral)    Resp 12    Ht 6' (1.829 m)    Wt 240 lb (108.9 kg)    SpO2 97%    BMI 32.55 kg/m2     Ear canals and tympanic membranes appear normal.  No wax. Oral cavity reveals no lesions. Neck reveals no adenopathy or thyromegaly. Carotids are 2+ without bruits. Lungs are clear to percussion. Good breath sounds with no wheezing or crackles. Heart reveals a regular rhythm with normal S1 and S2 no murmur gallop click or rub. Apical impulse is not palpable. Abdomen is soft and nontender with no hepatosplenomegaly or masses and no bruits. Extremities reveal no clubbing cyanosis or edema. Pulses are 2+ throughout. The feet reveal no deformities ulcerations or calluses. Normal sensation to monofilament testing. Skin exam reveals no suspicious growths, he does have subcutaneous cyst in his mid right arm and small one in the left axilla and a very small one on his right neck. Results for orders placed or performed in visit on 03/30/18   CBC WITH AUTOMATED DIFF   Result Value Ref Range    WBC 6.9 3.4 - 10.8 x10E3/uL    RBC 4.87 4.14 - 5.80 x10E6/uL    HGB 14.4 13.0 - 17.7 g/dL    HCT 44.4 37.5 - 51.0 %    MCV 91 79 - 97 fL    MCH 29.6 26.6 - 33.0 pg    MCHC 32.4 31.5 - 35.7 g/dL    RDW 14.2 12.3 - 15.4 %    PLATELET 960 250 - 688 x10E3/uL    NEUTROPHILS 49 Not Estab. %    Lymphocytes 42 Not Estab. %    MONOCYTES 6 Not Estab. %    EOSINOPHILS 3 Not Estab. %    BASOPHILS 0 Not Estab. %    ABS. NEUTROPHILS 3.4 1.4 - 7.0 x10E3/uL    Abs Lymphocytes 2.9 0.7 - 3.1 x10E3/uL    ABS. MONOCYTES 0.4 0.1 - 0.9 x10E3/uL    ABS. EOSINOPHILS 0.2 0.0 - 0.4 x10E3/uL    ABS. BASOPHILS 0.0 0.0 - 0.2 x10E3/uL    IMMATURE GRANULOCYTES 0 Not Estab. %    ABS. IMM.  GRANS. 0.0 0.0 - 0.1 L33U7/CG   METABOLIC PANEL, COMPREHENSIVE   Result Value Ref Range    Glucose 161 (H) 65 - 99 mg/dL    BUN 20 8 - 27 mg/dL    Creatinine 0.99 0.76 - 1.27 mg/dL    GFR est non-AA 76 >59 mL/min/1.73    GFR est AA 88 >59 mL/min/1.73    BUN/Creatinine ratio 20 10 - 24    Sodium 143 134 - 144 mmol/L    Potassium 4.3 3.5 - 5.2 mmol/L    Chloride 105 96 - 106 mmol/L    CO2 24 20 - 29 mmol/L    Calcium 9.1 8.6 - 10.2 mg/dL    Protein, total 6.8 6.0 - 8.5 g/dL    Albumin 4.4 3.5 - 4.8 g/dL    GLOBULIN, TOTAL 2.4 1.5 - 4.5 g/dL    A-G Ratio 1.8 1.2 - 2.2    Bilirubin, total 0.6 0.0 - 1.2 mg/dL    Alk.  phosphatase 55 39 - 117 IU/L    AST (SGOT) 16 0 - 40 IU/L    ALT (SGPT) 17 0 - 44 IU/L   URINALYSIS W/ RFLX MICROSCOPIC   Result Value Ref Range    Specific Gravity 1.025 1.005 - 1.030    pH (UA) 5.0 5.0 - 7.5    Color Yellow Yellow    Appearance Cloudy (A) Clear    Leukocyte Esterase Negative Negative    Protein Negative Negative/Trace    Glucose 2+ (A) Negative    Ketone Negative Negative    Blood Negative Negative    Bilirubin Negative Negative    Urobilinogen 0.2 0.2 - 1.0 mg/dL    Nitrites Negative Negative    Microscopic Examination Comment    LIPID PANEL   Result Value Ref Range    Cholesterol, total 119 100 - 199 mg/dL    Triglyceride 109 0 - 149 mg/dL    HDL Cholesterol 41 >39 mg/dL    VLDL, calculated 22 5 - 40 mg/dL    LDL, calculated 56 0 - 99 mg/dL   MICROALBUMIN, UR, RAND   Result Value Ref Range    Creatinine, urine 184.8 Not Estab. mg/dL    Microalbumin, urine 26.0 Not Estab. ug/mL    Microalb/Creat ratio (ug/mg creat.) 14.1 0.0 - 30.0 mg/g creat   CVD REPORT   Result Value Ref Range    INTERPRETATION Note    HEMOGLOBIN A1C WITH EAG   Result Value Ref Range    Hemoglobin A1c 8.3 (H) 4.8 - 5.6 %    Estimated average glucose 192 mg/dL   DIABETES PATIENT EDUCATION   Result Value Ref Range    PDF Image Not applicable    PSA, DIAGNOSTIC (PROSTATE SPECIFIC AG)   Result Value Ref Range    Prostate Specific Ag 3.4 0.0 - 4.0 ng/mL   TSH 3RD GENERATION   Result Value Ref Range    TSH 2.350 0.450 - 4.500 uIU/mL   HEPATITIS C AB   Result Value Ref Range    Hep C Virus Ab <0.1 0.0 - 0.9 s/co ratio Assessment: #1.  Diabetes doing better than last visit. He will continue metformin X are 1000 mg twice a day and he will be working on further weight loss. He is avoiding all sweets in his diet. #2. Hyperlipidemia doing better than usual.  He will continue atorvastatin 20 mg each evening. #3. Hypertension well controlled. He will continue Dyazide 1 daily. #4.  Insomnia, he will continue zolpidem 10 mg as needed. He has had no side effects from this dosage. #5.  Obesity down just 2 pounds in 3 months but now on a better diet and walking, he will continue working on weight loss. Follow-up 3 months with lab    [address] R. Jalil Dee MD FACP    Please note: This document has been produced using voice recognition software. Unrecognized errors in transcription may be present.

## 2018-07-10 NOTE — MR AVS SNAPSHOT
[facility] 
 
 
 [address], [address] 
[phone] Patient: Delia Allen III 
MRN: [id number] Visit Information Date & Time Provider Department Dept. Phone Encounter #  
 7/10/2018  7:45 AM Lorena Desir MD [facility] [phone] Your Appointments 10/11/2018  8:15 AM  
Office Visit with Lorena Desir MD  
[facility]) Appt Note: 3 month f/u  
 5445 [facility], [facility] [id number] [address]  
  
   
 [address], [address] Upcoming Health Maintenance Date Due DTaP/Tdap/Td series (1 - Tdap) 8/21/1967 Influenza Age 5 to Adult 8/1/2018 MEDICARE YEARLY EXAM 9/28/2018 EYE EXAM RETINAL OR DILATED Q1 9/28/2018 HEMOGLOBIN A1C Q6M 12/27/2018 MICROALBUMIN Q1 6/27/2019 LIPID PANEL Q1 6/27/2019 FOOT EXAM Q1 7/10/2019 GLAUCOMA SCREENING Q2Y 9/28/2019 Allergies as of 7/10/2018  Review Complete On: 7/10/2018 By: Lorena Desir MD  
  
 Severity Noted Reaction Type Reactions Ace Inhibitors Medium  Side Effect Cough Current Immunizations  Reviewed on 9/27/2017 Name Date Influenza High Dose Vaccine PF 9/6/2016, 10/1/2015 Influenza Vaccine 8/21/2017, 10/6/2014, 10/6/2014, 10/1/2013 Pneumococcal Conjugate (PCV-13) 9/27/2017 Pneumococcal Polysaccharide (PPSV-23) 2/10/2014 Zoster Vaccine, Live 8/11/2014  9:04 AM  
  
 Not reviewed this visit You Were Diagnosed With   
  
 Codes Comments Type 2 diabetes mellitus without complication, without long-term current use of insulin (HCC)    -  Primary ICD-10-CM: E11.9 ICD-9-CM: 250.00 Essential hypertension with goal blood pressure less than 140/90     ICD-10-CM: I10 
ICD-9-CM: 401.9 Hyperlipidemia LDL goal <100     ICD-10-CM: E78.5 ICD-9-CM: 272.4 Vitals BP Pulse Temp Resp Height(growth percentile) Weight(growth percentile) 130/72 68 97.8 °F (36.6 °C) (Oral) 12 6' (1.829 m) 240 lb (108.9 kg) SpO2 BMI Smoking Status 97% 32.55 kg/m2 Former Smoker Vitals History BMI and BSA Data Body Mass Index Body Surface Area 32.55 kg/m 2 2.35 m 2 Preferred Pharmacy Pharmacy Name Phone [address] [place] [address] [facility], [address] [place] [address] [phone] Your Updated Medication List  
  
   
This list is accurate as of 7/10/18  8:16 AM.  Always use your most recent med list.  
  
  
  
  
 atorvastatin 20 mg tablet Commonly known as:  LIPITOR  
TAKE ONE TABLET BY MOUTH ONCE DAILY IN THE EVENING  
  
 metFORMIN  mg tablet Commonly known as:  GLUCOPHAGE XR Take 2 Tabs by mouth two (2) times daily (with meals). triamterene-hydroCHLOROthiazide 37.5-25 mg per capsule Commonly known as:  Linh Petit Take 1 Cap by mouth daily. zolpidem 10 mg tablet Commonly known as:  AMBIEN Take 1 Tab by mouth nightly as needed for Sleep. Max Daily Amount: 10 mg.  
  
  
  
  
Prescriptions Sent to Pharmacy Refills  
 atorvastatin (LIPITOR) 20 mg tablet 3 Sig: TAKE ONE TABLET BY MOUTH ONCE DAILY IN THE EVENING Class: Normal  
 Pharmacy: [address] [facility] [address] [facility], [address] [facility] #: [phone] To-Do List   
 Around 10/17/2018 Lab:  HEMOGLOBIN A1C WITH EAG Around 10/17/2018 Lab:  LIPID PANEL Around 10/17/2018 Lab:  METABOLIC PANEL, COMPREHENSIVE [facility]! Dear Cecelia Reddy: 
Thank you for requesting a [employer] account. Our records indicate that you already have an active [employer] account. You can access your account anytime at [email or web address] Did you know that you can access your hospital and ER discharge instructions at any time in [employer]?   You can also review all of your test results from your hospital stay or ER visit. Additional Information If you have questions, please visit the Frequently Asked Questions section of the [employer] website at [email or web address] Remember, [employer] is NOT to be used for urgent needs. For medical emergencies, dial 911. Now available from your iPhone and Android! Please provide this summary of care documentation to your next provider. Your primary care clinician is listed as Morene Hodgkin. Judy Lane. If you have any questions after today's visit, please call [phone].

## 2018-09-12 RX ORDER — METFORMIN HYDROCHLORIDE 500 MG/1
1000 TABLET, EXTENDED RELEASE ORAL 2 TIMES DAILY WITH MEALS
Qty: 360 TAB | Refills: 0 | Status: SHIPPED | OUTPATIENT
Start: 2018-09-12 | End: 2018-12-07 | Stop reason: SDUPTHER

## 2018-09-12 NOTE — TELEPHONE ENCOUNTER
Last Visit: 07/10/2018 with MD Romina Pineda    Next Appointment: 10/11/2018 with MD Romina Pineda   Previous Refill Encounters: 06/15/2018 per MD Romina Pineda #[id number]    Requested Prescriptions     Pending Prescriptions Disp Refills    metFORMIN ER (GLUCOPHAGE XR) 500 mg tablet 360 Tab 0     Sig: Take 2 Tabs by mouth two (2) times daily (with meals).

## 2018-09-23 DIAGNOSIS — F51.04 CHRONIC INSOMNIA: ICD-10-CM

## 2018-09-24 RX ORDER — ZOLPIDEM TARTRATE 10 MG/1
TABLET ORAL
Qty: 30 TAB | Refills: 2 | OUTPATIENT
Start: 2018-09-24 | End: 2018-12-22 | Stop reason: SDUPTHER

## 2018-10-02 ENCOUNTER — APPOINTMENT (OUTPATIENT)
Dept: INTERNAL MEDICINE CLINIC | Age: 73
End: 2018-10-02

## 2018-10-02 ENCOUNTER — HOSPITAL ENCOUNTER (OUTPATIENT)
Dept: LAB | Age: 73
Discharge: HOME | End: 2018-10-02

## 2018-10-02 PROCEDURE — 99001 SPECIMEN HANDLING PT-LAB: CPT | Performed by: INTERNAL MEDICINE

## 2018-10-03 LAB
ALBUMIN SERPL-MCNC: 4.4 G/DL (ref 3.5–4.8)
ALBUMIN/GLOB SERPL: 1.8 {RATIO} (ref 1.2–2.2)
ALP SERPL-CCNC: 56 IU/L (ref 39–117)
ALT SERPL-CCNC: 17 IU/L (ref 0–44)
AST SERPL-CCNC: 17 IU/L (ref 0–40)
BILIRUB SERPL-MCNC: 0.5 MG/DL (ref 0–1.2)
BUN SERPL-MCNC: 20 MG/DL (ref 8–27)
BUN/CREAT SERPL: 19 (ref 10–24)
CALCIUM SERPL-MCNC: 9.5 MG/DL (ref 8.6–10.2)
CHLORIDE SERPL-SCNC: 99 MMOL/L (ref 96–106)
CHOLEST SERPL-MCNC: 129 MG/DL (ref 100–199)
CO2 SERPL-SCNC: 24 MMOL/L (ref 20–29)
CREAT SERPL-MCNC: 1.04 MG/DL (ref 0.76–1.27)
EST. AVERAGE GLUCOSE BLD GHB EST-MCNC: 200 MG/DL
GLOBULIN SER CALC-MCNC: 2.5 G/DL (ref 1.5–4.5)
GLUCOSE SERPL-MCNC: 160 MG/DL (ref 65–99)
HBA1C MFR BLD: 8.6 % (ref 4.8–5.6)
HDLC SERPL-MCNC: 45 MG/DL
INTERPRETATION, 910389: NORMAL
LDLC SERPL CALC-MCNC: 65 MG/DL (ref 0–99)
Lab: NORMAL
POTASSIUM SERPL-SCNC: 4.9 MMOL/L (ref 3.5–5.2)
PROT SERPL-MCNC: 6.9 G/DL (ref 6–8.5)
SODIUM SERPL-SCNC: 141 MMOL/L (ref 134–144)
TRIGL SERPL-MCNC: 97 MG/DL (ref 0–149)
VLDLC SERPL CALC-MCNC: 19 MG/DL (ref 5–40)

## 2018-10-11 ENCOUNTER — OFFICE VISIT (OUTPATIENT)
Dept: INTERNAL MEDICINE CLINIC | Age: 73
End: 2018-10-11

## 2018-10-11 VITALS
WEIGHT: 239 LBS | OXYGEN SATURATION: 96 % | HEIGHT: 72 IN | SYSTOLIC BLOOD PRESSURE: 136 MMHG | RESPIRATION RATE: 12 BRPM | TEMPERATURE: 97.8 F | DIASTOLIC BLOOD PRESSURE: 74 MMHG | BODY MASS INDEX: 32.37 KG/M2 | HEART RATE: 68 BPM

## 2018-10-11 DIAGNOSIS — I10 ESSENTIAL HYPERTENSION WITH GOAL BLOOD PRESSURE LESS THAN 140/90: ICD-10-CM

## 2018-10-11 DIAGNOSIS — E78.5 HYPERLIPIDEMIA LDL GOAL <100: ICD-10-CM

## 2018-10-11 DIAGNOSIS — E11.9 TYPE 2 DIABETES MELLITUS WITHOUT COMPLICATION, WITHOUT LONG-TERM CURRENT USE OF INSULIN (HCC): Primary | ICD-10-CM

## 2018-10-11 NOTE — PROGRESS NOTES
Kwabena Mendez III,born 1945, is a 68 y.o. male, who is seen today for reevaluation of diabetes hyperlipidemia insomnia hypertension obesity. He enjoys sweets and enjoys eating but feels great. He has had some vague discomfort for the last month or so in the upper right chest that seems to be muscular he feels it when he moves his shoulder. He had been using a pitchfork for a while and thinks it might be related to that but it has not gone away yet. It is not pleuritic and is not short of breath. He is taking all of his medicine correctly. He sleeps quite well with zolpidem as needed. Past Medical History:  
Diagnosis Date  Diabetes ([facility] [address]  HLD (hyperlipidemia)  Hypertension  Obesity  Sleep apnea Current Outpatient Prescriptions Medication Sig Dispense Refill  zolpidem (AMBIEN) 10 mg tablet TAKE 1 TABLET BY MOUTH AT BEDTIME AS NEEDED FOR SLEEP MAX  DAILY  AMOUNT  1  TABLET 30 Tab 2  
 metFORMIN ER (GLUCOPHAGE XR) 500 mg tablet Take 2 Tabs by mouth two (2) times daily (with meals). 360 Tab 0  
 atorvastatin (LIPITOR) 20 mg tablet TAKE ONE TABLET BY MOUTH ONCE DAILY IN THE EVENING 90 Tab 3  
 triamterene-hydroCHLOROthiazide (DYAZIDE) 37.5-25 mg per capsule Take 1 Cap by mouth daily. 90 Cap 3 Visit Vitals  /74  Pulse 68  Temp 97.8 °F (36.6 °C) (Oral)  Resp 12  Ht 6' (1.829 m)  Wt 239 lb (108.4 kg)  SpO2 96%  BMI 32.41 kg/m2 Carotids are 2+ without bruits. Lungs are clear to percussion. Good breath sounds with no wheezing or crackles. Heart reveals a regular rhythm with normal S1 and S2 no murmur gallop click or rub. Apical impulse is not palpable. Abdomen is soft and nontender with no hepatosplenomegaly or masses and no bruits. Extremities reveal no clubbing cyanosis or edema. Pulses are 2+. Results for orders placed or performed in visit on 07/10/18 METABOLIC PANEL, COMPREHENSIVE Result Value Ref Range Glucose 160 (H) 65 - 99 mg/dL BUN 20 8 - 27 mg/dL Creatinine 1.04 0.76 - 1.27 mg/dL GFR est non-AA 71 >59 mL/min/1.73 GFR est AA 83 >59 mL/min/1.73  
 BUN/Creatinine ratio 19 10 - 24 Sodium 141 134 - 144 mmol/L Potassium 4.9 3.5 - 5.2 mmol/L Chloride 99 96 - 106 mmol/L  
 CO2 24 20 - 29 mmol/L Calcium 9.5 8.6 - 10.2 mg/dL Protein, total 6.9 6.0 - 8.5 g/dL Albumin 4.4 3.5 - 4.8 g/dL GLOBULIN, TOTAL 2.5 1.5 - 4.5 g/dL A-G Ratio 1.8 1.2 - 2.2 Bilirubin, total 0.5 0.0 - 1.2 mg/dL Alk. phosphatase 56 39 - 117 IU/L  
 AST (SGOT) 17 0 - 40 IU/L  
 ALT (SGPT) 17 0 - 44 IU/L  
LIPID PANEL Result Value Ref Range Cholesterol, total 129 100 - 199 mg/dL Triglyceride 97 0 - 149 mg/dL HDL Cholesterol 45 >39 mg/dL VLDL, calculated 19 5 - 40 mg/dL LDL, calculated 65 0 - 99 mg/dL CVD REPORT Result Value Ref Range INTERPRETATION Note HEMOGLOBIN A1C WITH EAG Result Value Ref Range Hemoglobin A1c 8.6 (H) 4.8 - 5.6 % Estimated average glucose 200 mg/dL DIABETES PATIENT EDUCATION Result Value Ref Range PDF Image Not applicable Assessment: #1.  Diabetes slightly less well controlled, he will continue metformin 1000 mg twice a day try to do a little better with his diet and weight and if glucose is going higher we will add medication at next visit. He had an eye exam recently cannot remember his eye doctor's name, we got a note last year but so far not this year, the eye exam was 1 week ago today. #2. Hyperlipidemia doing very well. He will continue atorvastatin 20 mg each evening. #3. Hypertension is well controlled, he will continue Dyazide 1 daily. #4.  Chronic insomnia, he will continue zolpidem 10 mg as needed, he is having no side effects. #5.  Obesity little changed, encouraged him to work on losing a few pounds. Follow-up in 4 months with lab Eron Reynoso, [address] [facility] 
 
 Please note: This document has been produced using voice recognition software. Unrecognized errors in transcription may be present.

## 2018-10-11 NOTE — MR AVS SNAPSHOT
[address] [facility] 
 
 
 [address], [address] [place] [address] [facility] 
[phone] Patient: Gia Pantoja III 
MRN: [id number] JCK:4/[birth year]17 Visit Information Date & Time Provider Department Dept. Phone Encounter #  
 10/11/2018  8:15 AM Vaughn Johnson MD [facility] [address] [place] [address] [phone] [id number] Your Appointments 2/14/2019  8:15 AM  
Office Visit with Vaughn Johnson MD  
[facility] [address] [place] [address] [facility]) Appt Note: 4 month f/u  
 [address] [facility], [address] [place]  
  
   
 [address], [address] Upcoming Health Maintenance Date Due DTaP/Tdap/Td series (1 - Tdap) 8/21/1967 Shingrix Vaccine Age 50> (1 of 2) 8/21/1996 MEDICARE YEARLY EXAM 9/28/2018 EYE EXAM RETINAL OR DILATED Q1 9/28/2018 HEMOGLOBIN A1C Q6M 4/2/2019 MICROALBUMIN Q1 6/27/2019 FOOT EXAM Q1 7/10/2019 GLAUCOMA SCREENING Q2Y 9/28/2019 LIPID PANEL Q1 10/2/2019 Allergies as of 10/11/2018  Review Complete On: 10/11/2018 By: Vaughn Johnson MD  
  
 Severity Noted Reaction Type Reactions Ace Inhibitors Medium  Side Effect Cough Current Immunizations  Reviewed on 9/27/2017 Name Date Influenza High Dose Vaccine PF 9/24/2018, 9/6/2016, 10/1/2015 Influenza Vaccine 8/21/2017, 10/6/2014, 10/6/2014, 10/1/2013 Pneumococcal Conjugate (PCV-13) 9/27/2017 Pneumococcal Polysaccharide (PPSV-23) 2/10/2014 Zoster Vaccine, Live 8/11/2014  9:04 AM  
  
 Not reviewed this visit You Were Diagnosed With   
  
 Codes Comments Type 2 diabetes mellitus without complication, without long-term current use of insulin (HCC)    -  Primary ICD-10-CM: E11.9 ICD-9-CM: 250.00 Essential hypertension with goal blood pressure less than 140/90     ICD-10-CM: I10 
ICD-9-CM: 401.9 Hyperlipidemia LDL goal <100     ICD-10-CM: E78.5 ICD-9-CM: 272.4 Vitals BP Pulse Temp Resp Height(growth percentile) Weight(growth percentile) 136/74 68 97.8 °F (36.6 °C) (Oral) 12 6' (1.829 m) 239 lb (108.4 kg) SpO2 BMI Smoking Status 96% 32.41 kg/m2 Former Smoker Vitals History BMI and BSA Data Body Mass Index Body Surface Area  
 32.41 kg/m 2 2.35 m 2 Preferred Pharmacy Pharmacy Name Phone [facility] [address], [address] [place] [phone] Your Updated Medication List  
  
   
This list is accurate as of 10/11/18  8:27 AM.  Always use your most recent med list.  
  
  
  
  
 atorvastatin 20 mg tablet Commonly known as:  LIPITOR  
TAKE ONE TABLET BY MOUTH ONCE DAILY IN THE EVENING  
  
 metFORMIN  mg tablet Commonly known as:  GLUCOPHAGE XR Take 2 Tabs by mouth two (2) times daily (with meals). triamterene-hydroCHLOROthiazide 37.5-25 mg per capsule Commonly known as:  Mahala Eduardo Take 1 Cap by mouth daily. zolpidem 10 mg tablet Commonly known as:  AMBIEN  
TAKE 1 TABLET BY MOUTH AT BEDTIME AS NEEDED FOR SLEEP MAX  DAILY  AMOUNT  1  TABLET To-Do List   
 Around 02/18/2019 Lab:  HEMOGLOBIN A1C WITH EAG Around 02/18/2019 Lab:  LIPID PANEL Around 02/18/2019 Lab:  METABOLIC PANEL, COMPREHENSIVE [facility]! Dear Sangeetha Simpson: 
Thank you for requesting a [employer]ir[employer] account. Our records indicate that you already have an active Aquiris account. You can access your account anytime at [email or web address] Did you know that you can access your hospital and ER discharge instructions at any time in Aquiris? You can also review all of your test results from your hospital stay or ER visit. Additional Information If you have questions, please visit the Frequently Asked Questions section of the Aquiris website at [email or web address] Remember, Farehelperhart is NOT to be used for urgent needs. For medical emergencies, dial 911. Now available from your iPhone and Android! Please provide this summary of care documentation to your next provider. Your primary care clinician is listed as Rosario Sagastume. Jocelyn Montes De Oca. If you have any questions after today's visit, please call [phone].

## 2018-12-07 RX ORDER — METFORMIN HYDROCHLORIDE 500 MG/1
TABLET, EXTENDED RELEASE ORAL
Qty: 360 TAB | Refills: 0 | Status: SHIPPED | OUTPATIENT
Start: 2018-12-07 | End: 2019-03-12 | Stop reason: SDUPTHER

## 2018-12-22 DIAGNOSIS — F51.04 CHRONIC INSOMNIA: ICD-10-CM

## 2018-12-24 RX ORDER — ZOLPIDEM TARTRATE 10 MG/1
TABLET ORAL
Qty: 30 TAB | Refills: 2 | OUTPATIENT
Start: 2018-12-24 | End: 2019-10-22 | Stop reason: ALTCHOICE

## 2019-01-15 RX ORDER — TRIAMTERENE AND HYDROCHLOROTHIAZIDE 37.5; 25 MG/1; MG/1
CAPSULE ORAL
Qty: 90 CAP | Refills: 3 | Status: SHIPPED | OUTPATIENT
Start: 2019-01-15 | End: 2020-01-08

## 2019-01-29 ENCOUNTER — TELEPHONE (OUTPATIENT)
Dept: INTERNAL MEDICINE CLINIC | Age: 74
End: 2019-01-29

## 2019-02-08 ENCOUNTER — OFFICE VISIT (OUTPATIENT)
Dept: INTERNAL MEDICINE CLINIC | Age: 74
End: 2019-02-08

## 2019-02-08 VITALS
SYSTOLIC BLOOD PRESSURE: 150 MMHG | BODY MASS INDEX: 32.64 KG/M2 | HEART RATE: 80 BPM | HEIGHT: 72 IN | OXYGEN SATURATION: 98 % | RESPIRATION RATE: 14 BRPM | DIASTOLIC BLOOD PRESSURE: 74 MMHG | TEMPERATURE: 98.4 F | WEIGHT: 241 LBS

## 2019-02-08 DIAGNOSIS — J06.9 ACUTE URI: Primary | ICD-10-CM

## 2019-02-08 NOTE — PROGRESS NOTES
1. Have you been to the ER, urgent care clinic or hospitalized since your last visit? NO.  
 
2. Have you seen or consulted any other health care providers outside of the [address] [place] [address] since your last visit (Include any pap smears or colon screening)?  NO

## 2019-02-08 NOTE — PROGRESS NOTES
HPI/History Marianna Luna is a 68 y.o.  male who presents for evaluation. Patient reports irritated throat starting yesterday. Admits to some postnasal drainage but secretions have been clear/watery and otherwise nonpurulent. Admits to dry cough. No known fevers, LAD, exudate, body aches, malaise, or other sxs/complaints. Not using his CPAP and has been mouth breathing recently and wife uses a fan at night. Patient Active Problem List  
Diagnosis Code  Obesity E66.9  Hyperlipidemia LDL goal <100 E78.5  Essential hypertension with goal blood pressure less than 140/90 I10  
 Primary insomnia F51.01  
 Elevated PSA, less than 10 ng/ml R97.20  Type 2 diabetes mellitus without complication, without long-term current use of insulin ([facility]) E11.9  Obstructive sleep apnea syndrome G47.33  
 Chronic insomnia F51.04 Past Medical History:  
Diagnosis Date  Diabetes ([facility] [address]  HLD (hyperlipidemia)  Hypertension  Obesity  Sleep apnea Past Surgical History:  
Procedure Laterality Date  HX COLONOSCOPY  4/30/15  
 adenomatous polyps, 5 years. Dr. Anais Ladd  
 HX HEENT    
 tooth extraction Social History Socioeconomic History  Marital status:  Spouse name: Not on file  Number of children: Not on file  Years of education: Not on file  Highest education level: Not on file Social Needs  Financial resource strain: Not on file  Food insecurity - worry: Not on file  Food insecurity - inability: Not on file  Transportation needs - medical: Not on file  Transportation needs - non-medical: Not on file Occupational History  Not on file Tobacco Use  Smoking status: Former Smoker Packs/day: 1.00 Years: 12.00 Pack years: 12.00 Last attempt to quit: 1978 Years since quittin.0  Smokeless tobacco: Never Used Substance and Sexual Activity  Alcohol use: Yes   Alcohol/week: 0.0 oz  
 Types: 1 - 2 Cans of beer per week  Drug use: No  
 Sexual activity: Yes  
  Partners: Female Other Topics Concern  Not on file Social History Narrative  Not on file Family History Problem Relation Age of Onset  Cancer Mother   
     ovarian cancer  Diabetes Father  Heart Disease Father  Cancer Maternal Grandfather   
     unknown type  Cancer Sister \"everywhere\" Current Outpatient Medications Medication Sig  
 triamterene-hydroCHLOROthiazide (DYAZIDE) 37.5-25 mg per capsule TAKE ONE CAPSULE BY MOUTH ONCE DAILY  zolpidem (AMBIEN) 10 mg tablet TAKE 1 TABLET BY MOUTH ONCE DAILY AT BEDTIME AS NEEDED FOR SLEEP. MAX: 1 TABLET PER DAY  metFORMIN ER (GLUCOPHAGE XR) 500 mg tablet TAKE 2 TABLETS BY MOUTH TWICE DAILY WITH MEALS  
 atorvastatin (LIPITOR) 20 mg tablet TAKE ONE TABLET BY MOUTH ONCE DAILY IN THE EVENING No current facility-administered medications for this visit. Allergies Allergen Reactions  Ace Inhibitors Cough Review of Systems Aside from those included in HPI, remainder of ROS negative. Physical Examination Visit Vitals /74 (BP 1 Location: Left arm, BP Patient Position: Sitting) Pulse 80 Temp 98.4 °F (36.9 °C) (Oral) Resp 14 Ht 6' (1.829 m) Wt 241 lb (109.3 kg) SpO2 98% BMI 32.69 kg/m² General - Alert and in no acute distress. Pt appears well, comfortable, and in good spirits. Pleasant, engaging. Laughing and joking during visit. Nontoxic. Not anxious, non-diaphoretic. Mental status - Appropriate mood, behavior, speech content, dress, and thought processes. Eyes - No periorbital findings. Pupils equal and reactive, extraocular movements intact. No erythema or discharge. Ears - Auditory canals and TMs unremarkable. Nose - No erythema. No rhinorrhea currently. Mouth - Mucous membranes moist. Pharynx without lesions, swelling, erythema, or exudate. Normal phonation. No trismus. Neck - Supple without rigidity. Lymph - No periauricular, perimandibular, or ant/post cervical tenderness or swelling. Pulm - No cough during visit. Full, complete sentences. No tachypnea, retractions, or cyanosis. Good respiratory effort. Equal and clear to auscultation bilat. No appreciable wheezes, rales, or rhonchi. Cardiovascular - Normal rate, regular rhythm. No appreciable murmurs or gallops. Assessment and Plan 1. Acute URI - symptoms started yesterday. Appears viral with no evidence of bacterial process. Use supportive measures as discussed at length. We discussed course and prognosis as well as probability of worsening before improvement. Return/call if needed, progressive worsening, or ominous developments as discussed. Further planning as warranted. Pt happily agrees with plan. PLEASE NOTE:  
This document has been produced using voice recognition software. Unrecognized errors in transcription may be present. Jony Rivera PA-C [facility] 
[phone] 
2/8/2019

## 2019-02-11 ENCOUNTER — APPOINTMENT (OUTPATIENT)
Dept: INTERNAL MEDICINE CLINIC | Age: 74
End: 2019-02-11

## 2019-02-12 LAB
ALBUMIN SERPL-MCNC: 4.1 G/DL (ref 3.5–4.8)
ALBUMIN/GLOB SERPL: 1.5 {RATIO} (ref 1.2–2.2)
ALP SERPL-CCNC: 60 IU/L (ref 39–117)
ALT SERPL-CCNC: 21 IU/L (ref 0–44)
AST SERPL-CCNC: 13 IU/L (ref 0–40)
BILIRUB SERPL-MCNC: 0.5 MG/DL (ref 0–1.2)
BUN SERPL-MCNC: 20 MG/DL (ref 8–27)
BUN/CREAT SERPL: 23 (ref 10–24)
CALCIUM SERPL-MCNC: 9.3 MG/DL (ref 8.6–10.2)
CHLORIDE SERPL-SCNC: 101 MMOL/L (ref 96–106)
CHOLEST SERPL-MCNC: 129 MG/DL (ref 100–199)
CO2 SERPL-SCNC: 26 MMOL/L (ref 20–29)
CREAT SERPL-MCNC: 0.87 MG/DL (ref 0.76–1.27)
EST. AVERAGE GLUCOSE BLD GHB EST-MCNC: 189 MG/DL
GLOBULIN SER CALC-MCNC: 2.8 G/DL (ref 1.5–4.5)
GLUCOSE SERPL-MCNC: 137 MG/DL (ref 65–99)
HBA1C MFR BLD: 8.2 % (ref 4.8–5.6)
HDLC SERPL-MCNC: 42 MG/DL
INTERPRETATION, 910389: NORMAL
LDLC SERPL CALC-MCNC: 72 MG/DL (ref 0–99)
Lab: NORMAL
POTASSIUM SERPL-SCNC: 4.4 MMOL/L (ref 3.5–5.2)
PROT SERPL-MCNC: 6.9 G/DL (ref 6–8.5)
SODIUM SERPL-SCNC: 142 MMOL/L (ref 134–144)
TRIGL SERPL-MCNC: 74 MG/DL (ref 0–149)
VLDLC SERPL CALC-MCNC: 15 MG/DL (ref 5–40)

## 2019-02-14 ENCOUNTER — OFFICE VISIT (OUTPATIENT)
Dept: INTERNAL MEDICINE CLINIC | Age: 74
End: 2019-02-14

## 2019-02-14 VITALS
DIASTOLIC BLOOD PRESSURE: 74 MMHG | WEIGHT: 240 LBS | HEIGHT: 73 IN | TEMPERATURE: 97.6 F | HEART RATE: 78 BPM | SYSTOLIC BLOOD PRESSURE: 126 MMHG | BODY MASS INDEX: 31.81 KG/M2 | RESPIRATION RATE: 16 BRPM | OXYGEN SATURATION: 95 %

## 2019-02-14 DIAGNOSIS — E11.9 TYPE 2 DIABETES MELLITUS WITHOUT COMPLICATION, WITHOUT LONG-TERM CURRENT USE OF INSULIN (HCC): ICD-10-CM

## 2019-02-14 DIAGNOSIS — E78.5 HYPERLIPIDEMIA LDL GOAL <100: ICD-10-CM

## 2019-02-14 DIAGNOSIS — F41.9 ANXIETY: ICD-10-CM

## 2019-02-14 DIAGNOSIS — Z12.5 PROSTATE CANCER SCREENING: Primary | ICD-10-CM

## 2019-02-14 DIAGNOSIS — I10 ESSENTIAL HYPERTENSION WITH GOAL BLOOD PRESSURE LESS THAN 140/90: ICD-10-CM

## 2019-02-14 RX ORDER — LORAZEPAM 1 MG/1
TABLET ORAL
Qty: 90 TAB | Refills: 1 | Status: SHIPPED | OUTPATIENT
Start: 2019-02-14 | End: 2019-06-10 | Stop reason: SDUPTHER

## 2019-02-14 NOTE — PROGRESS NOTES
Aspen Ocasio III,born 1945, is a 68 y.o. male, who is seen today for reevaluation of diabetes, hyperlipidemia, hypertension and insomnia. He has had a hard time getting to sleep for very long time, he keeps thinking about things but when he uses zolpidem he is able to fall asleep and get at least 4 hours of good sleep before starting to wake up intermittently. He is taking all of his other medicine correctly and following a healthy diet. He has no other current complaints as far as the way he feels. Past Medical History:  
Diagnosis Date  Diabetes ([facility] [address]  HLD (hyperlipidemia)  Hypertension  Obesity  Sleep apnea Current Outpatient Medications Medication Sig Dispense Refill  triamterene-hydroCHLOROthiazide (DYAZIDE) 37.5-25 mg per capsule TAKE ONE CAPSULE BY MOUTH ONCE DAILY 90 Cap 3  
 zolpidem (AMBIEN) 10 mg tablet TAKE 1 TABLET BY MOUTH ONCE DAILY AT BEDTIME AS NEEDED FOR SLEEP. MAX: 1 TABLET PER DAY 30 Tab 2  
 metFORMIN ER (GLUCOPHAGE XR) 500 mg tablet TAKE 2 TABLETS BY MOUTH TWICE DAILY WITH MEALS 360 Tab 0  
 atorvastatin (LIPITOR) 20 mg tablet TAKE ONE TABLET BY MOUTH ONCE DAILY IN THE EVENING 90 Tab 3 Visit Vitals /74 (BP 1 Location: Left arm, BP Patient Position: Sitting) Pulse 78 Temp 97.6 °F (36.4 °C) (Oral) Resp 16 Ht 6' 1\" (1.854 m) Wt 240 lb (108.9 kg) SpO2 95% BMI 31.66 kg/m² Carotids are 2+ without bruits. Lungs are clear to percussion. Good breath sounds with no wheezing or crackles. Heart reveals a regular rhythm with normal S1 and S2 no murmur gallop click or rub. Apical impulse is not palpable. Abdomen is soft and nontender with no hepatosplenic megaly or masses and no bruits. Extremities reveal no clubbing cyanosis or edema. Pulses are 2+. Results for orders placed or performed in visit on 10/11/18 METABOLIC PANEL, COMPREHENSIVE Result Value Ref Range Glucose 137 (H) 65 - 99 mg/dL BUN 20 8 - 27 mg/dL Creatinine 0.87 0.76 - 1.27 mg/dL GFR est non-AA 86 >59 mL/min/1.73 GFR est  >59 mL/min/1.73  
 BUN/Creatinine ratio 23 10 - 24 Sodium 142 134 - 144 mmol/L Potassium 4.4 3.5 - 5.2 mmol/L Chloride 101 96 - 106 mmol/L  
 CO2 26 20 - 29 mmol/L Calcium 9.3 8.6 - 10.2 mg/dL Protein, total 6.9 6.0 - 8.5 g/dL Albumin 4.1 3.5 - 4.8 g/dL GLOBULIN, TOTAL 2.8 1.5 - 4.5 g/dL A-G Ratio 1.5 1.2 - 2.2 Bilirubin, total 0.5 0.0 - 1.2 mg/dL Alk. phosphatase 60 39 - 117 IU/L  
 AST (SGOT) 13 0 - 40 IU/L  
 ALT (SGPT) 21 0 - 44 IU/L  
LIPID PANEL Result Value Ref Range Cholesterol, total 129 100 - 199 mg/dL Triglyceride 74 0 - 149 mg/dL HDL Cholesterol 42 >39 mg/dL VLDL, calculated 15 5 - 40 mg/dL LDL, calculated 72 0 - 99 mg/dL CVD REPORT Result Value Ref Range INTERPRETATION Note HEMOGLOBIN A1C WITH EAG Result Value Ref Range Hemoglobin A1c 8.2 (H) 4.8 - 5.6 % Estimated average glucose 189 mg/dL DIABETES PATIENT EDUCATION Result Value Ref Range PDF Image Not applicable Assessment: #1.  Diabetes doing quite well, he will do the best he can to follow his diabetic diet and try to lose a few pounds over the next several months. He will continue metformin 1000 mg twice a day. #2. Hyperlipidemia doing well, he will continue atorvastatin 20 mg daily. #3. Hypertension blood pressure well controlled, he will continue Dyazide 1 daily. #4.  Chronic insomnia, insurance will no longer cover zolpidem so we will try lorazepam 1 mg at bedtime. Follow-up in 4 months for complete evaluation Erno Tim, [address] Please note: This document has been produced using voice recognition software. Unrecognized errors in transcription may be present.

## 2019-02-14 NOTE — PROGRESS NOTES
1. Have you been to the ER, urgent care clinic or hospitalized since your last visit? NO 
      
 
2. Have you seen or consulted any other health care providers outside of the [address] [place] [address] since your last visit (Include any pap smears or colon screening)? NO Do you have an Advanced Directive? YES Would you like information on Advanced Directives?  NO

## 2019-03-12 RX ORDER — METFORMIN HYDROCHLORIDE 500 MG/1
TABLET, EXTENDED RELEASE ORAL
Qty: 360 TAB | Refills: 0 | Status: SHIPPED | OUTPATIENT
Start: 2019-03-12 | End: 2019-06-10 | Stop reason: SDUPTHER

## 2019-06-10 DIAGNOSIS — F41.9 ANXIETY: ICD-10-CM

## 2019-06-10 RX ORDER — LORAZEPAM 1 MG/1
TABLET ORAL
Qty: 30 TAB | Refills: 5 | OUTPATIENT
Start: 2019-06-10 | End: 2019-12-18

## 2019-06-10 RX ORDER — METFORMIN HYDROCHLORIDE 500 MG/1
TABLET, EXTENDED RELEASE ORAL
Qty: 360 TAB | Refills: 0 | Status: SHIPPED | OUTPATIENT
Start: 2019-06-10 | End: 2019-09-06 | Stop reason: SDUPTHER

## 2019-07-17 ENCOUNTER — OFFICE VISIT (OUTPATIENT)
Dept: INTERNAL MEDICINE CLINIC | Age: 74
End: 2019-07-17

## 2019-07-17 VITALS
BODY MASS INDEX: 32.34 KG/M2 | DIASTOLIC BLOOD PRESSURE: 76 MMHG | WEIGHT: 244 LBS | OXYGEN SATURATION: 98 % | TEMPERATURE: 97.6 F | HEART RATE: 64 BPM | SYSTOLIC BLOOD PRESSURE: 138 MMHG | HEIGHT: 73 IN | RESPIRATION RATE: 16 BRPM

## 2019-07-17 DIAGNOSIS — G47.33 OBSTRUCTIVE SLEEP APNEA SYNDROME: ICD-10-CM

## 2019-07-17 DIAGNOSIS — E78.5 HYPERLIPIDEMIA LDL GOAL <100: ICD-10-CM

## 2019-07-17 DIAGNOSIS — I10 ESSENTIAL HYPERTENSION WITH GOAL BLOOD PRESSURE LESS THAN 140/90: ICD-10-CM

## 2019-07-17 DIAGNOSIS — E11.9 TYPE 2 DIABETES MELLITUS WITHOUT COMPLICATION, WITHOUT LONG-TERM CURRENT USE OF INSULIN (HCC): ICD-10-CM

## 2019-07-17 DIAGNOSIS — E66.9 OBESITY (BMI 30-39.9): ICD-10-CM

## 2019-07-17 DIAGNOSIS — Z00.00 ROUTINE GENERAL MEDICAL EXAMINATION AT A HEALTH CARE FACILITY: Primary | ICD-10-CM

## 2019-07-17 RX ORDER — GLIPIZIDE 10 MG/1
10 TABLET, FILM COATED, EXTENDED RELEASE ORAL DAILY
Qty: 90 TAB | Refills: 3 | Status: SHIPPED | OUTPATIENT
Start: 2019-07-17 | End: 2020-06-29 | Stop reason: SDUPTHER

## 2019-07-17 NOTE — PROGRESS NOTES
Tom Hull presents today for   Chief Complaint   Patient presents with    Complete Physical     exam room [address]     completed on 7-3-19              Depression Screening:  3 most recent PHQ Screens 2/14/2019   Little interest or pleasure in doing things Not at all   Feeling down, depressed, irritable, or hopeless Not at all   Total Score PHQ 2 0       Learning Assessment:  Learning Assessment 2/10/2014   PRIMARY LEARNER Patient   HIGHEST LEVEL OF EDUCATION - PRIMARY LEARNER  4 YEARS [facility] PRIMARY LEARNER NONE   CO-LEARNER CAREGIVER No   PRIMARY LANGUAGE ENGLISH   LEARNER PREFERENCE PRIMARY LISTENING   ANSWERED BY Elizabeth Murguia   RELATIONSHIP SELF       Abuse Screening:  Abuse Screening Questionnaire 7/17/2019   Do you ever feel afraid of your partner? N   Are you in a relationship with someone who physically or mentally threatens you? N   Is it safe for you to go home? Y       Fall Risk  Fall Risk Assessment, last 12 mths 2/14/2019   Able to walk? Yes   Fall in past 12 months? No   Fall with injury? -   Number of falls in past 12 months -   Fall Risk Score -           Coordination of Care:  1. Have you been to the ER, urgent care clinic since your last visit? Hospitalized since your last visit? no    2. Have you seen or consulted any other health care providers outside of the [address] [place] [address] since your last visit? Include any pap smears or colon screening. no      Advance Directive:  1. Do you have an advance directive in place? Patient Reply:yes        2. Per patient no changes to their ACP contact no.

## 2019-07-17 NOTE — PROGRESS NOTES
Damaso Tariq III,born 1945, is a 68 y.o. male, who is seen today for routine physical exam and follow-up on diabetes hyperlipidemia hypertension obstructive sleep apnea insomnia. He has not tolerated CPAP very well so he has not been using it for a long time. He does take a nap sometimes in the afternoon if he gets sleepy but never feels sleepy behind the wheel. He uses zolpidem some nights but is not sure if it helps him sleep or not. He is not following any type of diet, he eats desserts and even though he knows he should do better with his diet he has not really tried. He does take all of his medicine correctly. He has noticed some discomfort in his right shoulder with trying to put it behind his back but otherwise has good use of the shoulder. Past Medical History:   Diagnosis Date    Diabetes ([address]     HLD (hyperlipidemia)     Hypertension     Obesity     Sleep apnea      Past Surgical History:   Procedure Laterality Date    HX COLONOSCOPY  4/30/15    adenomatous polyps, 5 years. Dr. Kiley Prather HX HEENT      tooth extraction     Current Outpatient Medications   Medication Sig Dispense Refill    metFORMIN ER (GLUCOPHAGE XR) 500 mg tablet TAKE 2 TABLETS BY MOUTH TWICE DAILY WITH MEALS 360 Tab 0    LORazepam (ATIVAN) 1 mg tablet TAKE 1 TABLET BY MOUTH ONCE DAILY AT BEDTIME AS NEEDED FOR ANXIETY 30 Tab 5    triamterene-hydroCHLOROthiazide (DYAZIDE) 37.5-25 mg per capsule TAKE ONE CAPSULE BY MOUTH ONCE DAILY 90 Cap 3    zolpidem (AMBIEN) 10 mg tablet TAKE 1 TABLET BY MOUTH ONCE DAILY AT BEDTIME AS NEEDED FOR SLEEP.  MAX: 1 TABLET PER DAY 30 Tab 2    atorvastatin (LIPITOR) 20 mg tablet TAKE ONE TABLET BY MOUTH ONCE DAILY IN THE EVENING 90 Tab 3     Allergies   Allergen Reactions    Ace Inhibitors Cough     Social History     Socioeconomic History    Marital status:      Spouse name: Not on file    Number of children: Not on file    Years of education: Not on file    Highest education level: Not on file   Tobacco Use    Smoking status: Former Smoker     Packs/day: 1.00     Years: 12.00     Pack years: 12.00     Last attempt to quit: 1978     Years since quittin.4    Smokeless tobacco: Never Used   Substance and Sexual Activity    Alcohol use: Yes     Alcohol/week: 0.0 standard drinks     Types: 1 - 2 Cans of beer per week    Drug use: No    Sexual activity: Yes     Partners: Female     Visit Vitals  /76   Pulse 64   Temp 97.6 °F (36.4 °C) (Oral)   Resp 16   Ht 6' 1\" (1.854 m)   Wt 244 lb (110.7 kg)   SpO2 98%   BMI 32.19 kg/m²     Ear canals and tympanic membranes appear normal.  Good light reflex bilaterally. Oral cavity reveals no lesions. Neck reveals no adenopathy or thyromegaly. Carotids are 2+ without bruits. Lungs are clear to percussion. Good breath sounds with no wheezing or crackles. Heart reveals a regular rhythm with normal S1 and S2 no murmur gallop click or rub. Apical impulse is not palpable. Abdomen is soft and nontender with no hepatosplenomegaly or masses and no bruits. Extremities reveal no clubbing cyanosis or edema. Pulses are 2+ except 1+ dorsalis pedis and posterior tibialis pulses. The feet reveal no deformities ulcerations or calluses and there is normal sensation to monofilament testing.   Is a thickened area of skin on his neck and apparent cyst on his upper arm, I do not see any poor or oxidized material centrally but it may well be an epidermal inclusion cyst.        Results for orders placed or performed in visit on 19   PSA SCREENING (SCREENING)   Result Value Ref Range    Prostate Specific Ag 3.5 0.0 - 4.0 ng/mL   URINALYSIS W/ RFLX MICROSCOPIC   Result Value Ref Range    Specific Gravity      >=1.030 (A) 1.005 - 1.030    pH (UA) 5.5 5.0 - 7.5    Color Yellow Yellow    Appearance Clear Clear    Leukocyte Esterase Negative Negative    Protein Negative Negative/Trace    Glucose 3+ (A) Negative    Ketone Negative Negative Blood Negative Negative    Bilirubin Negative Negative    Urobilinogen 0.2 0.2 - 1.0 mg/dL    Nitrites Negative Negative    Microscopic Examination Comment    METABOLIC PANEL, COMPREHENSIVE   Result Value Ref Range    Glucose 184 (H) 65 - 99 mg/dL    BUN 20 8 - 27 mg/dL    Creatinine 0.92 0.76 - 1.27 mg/dL    GFR est non-AA 83 >59 mL/min/1.73    GFR est AA 96 >59 mL/min/1.73    BUN/Creatinine ratio 22 10 - 24    Sodium 142 134 - 144 mmol/L    Potassium 4.4 3.5 - 5.2 mmol/L    Chloride 103 96 - 106 mmol/L    CO2 26 20 - 29 mmol/L    Calcium 9.4 8.6 - 10.2 mg/dL    Protein, total 6.7 6.0 - 8.5 g/dL    Albumin 4.1 3.5 - 4.8 g/dL    GLOBULIN, TOTAL 2.6 1.5 - 4.5 g/dL    A-G Ratio 1.6 1.2 - 2.2    Bilirubin, total 0.5 0.0 - 1.2 mg/dL    Alk. phosphatase 55 39 - 117 IU/L    AST (SGOT) 17 0 - 40 IU/L    ALT (SGPT) 22 0 - 44 IU/L   MICROALBUMIN, UR, RAND W/ MICROALB/CREAT RATIO   Result Value Ref Range    Creatinine, urine 205.4 Not Estab. mg/dL    Microalbumin, urine 56.4 Not Estab. ug/mL    Microalb/Creat ratio (ug/mg creat.) 27.5 0.0 - 30.0 mg/g creat   LIPID PANEL   Result Value Ref Range    Cholesterol, total 133 100 - 199 mg/dL    Triglyceride 97 0 - 149 mg/dL    HDL Cholesterol 42 >39 mg/dL    VLDL, calculated 19 5 - 40 mg/dL    LDL, calculated 72 0 - 99 mg/dL   HEMOGLOBIN A1C WITH EAG   Result Value Ref Range    Hemoglobin A1c 8.9 (H) 4.8 - 5.6 %    Estimated average glucose 209 mg/dL   CBC WITH AUTOMATED DIFF   Result Value Ref Range    WBC 6.7 3.4 - 10.8 x10E3/uL    RBC 4.97 4.14 - 5.80 x10E6/uL    HGB 14.7 13.0 - 17.7 g/dL    HCT 44.5 37.5 - 51.0 %    MCV 90 79 - 97 fL    MCH 29.6 26.6 - 33.0 pg    MCHC 33.0 31.5 - 35.7 g/dL    RDW 14.9 12.3 - 15.4 %    PLATELET 872 594 - 148 x10E3/uL    NEUTROPHILS 45 Not Estab. %    Lymphocytes 42 Not Estab. %    MONOCYTES 8 Not Estab. %    EOSINOPHILS 4 Not Estab. %    BASOPHILS 1 Not Estab. %    ABS.  NEUTROPHILS 3.0 1.4 - 7.0 x10E3/uL    Abs Lymphocytes 2.8 0.7 - 3.1 x10E3/uL    ABS. MONOCYTES 0.6 0.1 - 0.9 x10E3/uL    ABS. EOSINOPHILS 0.3 0.0 - 0.4 x10E3/uL    ABS. BASOPHILS 0.0 0.0 - 0.2 x10E3/uL    IMMATURE GRANULOCYTES 0 Not Estab. %    ABS. IMM. GRANS. 0.0 0.0 - 0.1 x10E3/uL   CVD REPORT   Result Value Ref Range    INTERPRETATION Note    DIABETES PATIENT EDUCATION   Result Value Ref Range    PDF Image Not applicable      Assessment: #1.  Diabetes inadequately controlled, he will continue diabetic diet work on weight loss and continue metformin 1000 mg twice a day but we will also add glipizide SR once a day. #2. Hyperlipidemia doing very well, he will continue atorvastatin 20 mg daily. #3.  Obesity up 3 more pounds at 5 months, of encouraged him to work harder on weight loss, not just because of glucose levels but all aspects of his health are likely to be better if he can lose some weight. #4. Hypertension is controlled, he will continue Dyazide 1 daily. #5.  Obstructive sleep apnea not interested in using CPAP, if he has worsening symptoms through the daytime or any symptoms driving I have advised him he really should try CPAP again. #6. An area on his neck and his arm but certainly seem to be benign, he will see Dr. Gwendolyn Rodriguez, whom he has seen before. #7.  Some stiffness and discomfort in the right shoulder but almost normal use, if this worsens substantially I told him I would recommend that he see Dr. Shiv Ferris. He did have a mild case of shingles after having received the old shingles vaccine, I did recommend that he get the new one and he will look into that with his pharmacy. Follow-up 4 months with [facility] BRIGID Dawson MD FACP    Please note: This document has been produced using voice recognition software. Unrecognized errors in transcription may be present.

## 2019-07-18 ENCOUNTER — TELEPHONE (OUTPATIENT)
Dept: INTERNAL MEDICINE CLINIC | Age: 74
End: 2019-07-18

## 2019-07-18 NOTE — TELEPHONE ENCOUNTER
Patient contacted, patient identified with two identifiers (Name & ). Patient advised per DR. Romano to continue to taking the metformin along with the glipizide. Patient verbalizes understanding.

## 2019-07-18 NOTE — TELEPHONE ENCOUNTER
Pt was perscribed GLIPIZIDE SR  He is asking if he is suppose to take it along with his METFORMIN polease advise

## 2019-07-22 RX ORDER — ATORVASTATIN CALCIUM 20 MG/1
TABLET, FILM COATED ORAL
Qty: 90 TAB | Refills: 3 | Status: SHIPPED | OUTPATIENT
Start: 2019-07-22 | End: 2020-07-12

## 2019-09-06 RX ORDER — METFORMIN HYDROCHLORIDE 500 MG/1
TABLET, EXTENDED RELEASE ORAL
Qty: 360 TAB | Refills: 0 | Status: SHIPPED | OUTPATIENT
Start: 2019-09-06 | End: 2019-12-01 | Stop reason: SDUPTHER

## 2019-10-18 ENCOUNTER — APPOINTMENT (OUTPATIENT)
Dept: INTERNAL MEDICINE CLINIC | Age: 74
End: 2019-10-18

## 2019-10-19 LAB
ALBUMIN SERPL-MCNC: 4.2 G/DL (ref 3.5–4.8)
ALBUMIN/GLOB SERPL: 1.7 {RATIO} (ref 1.2–2.2)
ALP SERPL-CCNC: 50 IU/L (ref 39–117)
ALT SERPL-CCNC: 20 IU/L (ref 0–44)
AST SERPL-CCNC: 17 IU/L (ref 0–40)
BILIRUB SERPL-MCNC: 0.4 MG/DL (ref 0–1.2)
BUN SERPL-MCNC: 21 MG/DL (ref 8–27)
BUN/CREAT SERPL: 23 (ref 10–24)
CALCIUM SERPL-MCNC: 9.4 MG/DL (ref 8.6–10.2)
CHLORIDE SERPL-SCNC: 103 MMOL/L (ref 96–106)
CHOLEST SERPL-MCNC: 140 MG/DL (ref 100–199)
CO2 SERPL-SCNC: 24 MMOL/L (ref 20–29)
CREAT SERPL-MCNC: 0.93 MG/DL (ref 0.76–1.27)
EST. AVERAGE GLUCOSE BLD GHB EST-MCNC: 157 MG/DL
GLOBULIN SER CALC-MCNC: 2.5 G/DL (ref 1.5–4.5)
GLUCOSE SERPL-MCNC: 132 MG/DL (ref 65–99)
HBA1C MFR BLD: 7.1 % (ref 4.8–5.6)
HDLC SERPL-MCNC: 47 MG/DL
INTERPRETATION, 910389: NORMAL
LDLC SERPL CALC-MCNC: 75 MG/DL (ref 0–99)
Lab: NORMAL
POTASSIUM SERPL-SCNC: 4.6 MMOL/L (ref 3.5–5.2)
PROT SERPL-MCNC: 6.7 G/DL (ref 6–8.5)
SODIUM SERPL-SCNC: 143 MMOL/L (ref 134–144)
TRIGL SERPL-MCNC: 92 MG/DL (ref 0–149)
VLDLC SERPL CALC-MCNC: 18 MG/DL (ref 5–40)

## 2019-10-22 ENCOUNTER — OFFICE VISIT (OUTPATIENT)
Dept: INTERNAL MEDICINE CLINIC | Age: 74
End: 2019-10-22

## 2019-10-22 VITALS
SYSTOLIC BLOOD PRESSURE: 134 MMHG | WEIGHT: 240 LBS | TEMPERATURE: 97.8 F | BODY MASS INDEX: 31.81 KG/M2 | OXYGEN SATURATION: 98 % | RESPIRATION RATE: 16 BRPM | HEART RATE: 88 BPM | HEIGHT: 73 IN | DIASTOLIC BLOOD PRESSURE: 70 MMHG

## 2019-10-22 DIAGNOSIS — M25.511 RIGHT SHOULDER PAIN, UNSPECIFIED CHRONICITY: ICD-10-CM

## 2019-10-22 DIAGNOSIS — E11.9 TYPE 2 DIABETES MELLITUS WITHOUT COMPLICATION, WITHOUT LONG-TERM CURRENT USE OF INSULIN (HCC): Primary | ICD-10-CM

## 2019-10-22 DIAGNOSIS — G47.33 OBSTRUCTIVE SLEEP APNEA SYNDROME: ICD-10-CM

## 2019-10-22 DIAGNOSIS — I10 ESSENTIAL HYPERTENSION WITH GOAL BLOOD PRESSURE LESS THAN 140/90: ICD-10-CM

## 2019-10-22 DIAGNOSIS — E66.9 OBESITY (BMI 30-39.9): ICD-10-CM

## 2019-10-22 NOTE — PROGRESS NOTES
Angie Hinton III presents today for   Chief Complaint   Patient presents with    Diabetes     4 month follow up- labs done              Depression Screening:  3 most recent PHQ Screens 10/22/2019   Little interest or pleasure in doing things Not at all   Feeling down, depressed, irritable, or hopeless Not at all   Total Score PHQ 2 0       Learning Assessment:  Learning Assessment 2/10/2014   PRIMARY LEARNER Patient   HIGHEST LEVEL OF EDUCATION - PRIMARY LEARNER  4 YEARS [facility] PRIMARY LEARNER NONE   CO-LEARNER CAREGIVER No   PRIMARY LANGUAGE ENGLISH   LEARNER PREFERENCE PRIMARY LISTENING   ANSWERED BY Azar Ramos   RELATIONSHIP SELF       Abuse Screening:  Abuse Screening Questionnaire 10/22/2019   Do you ever feel afraid of your partner? N   Are you in a relationship with someone who physically or mentally threatens you? N   Is it safe for you to go home? Y       Fall Risk  Fall Risk Assessment, last 12 mths 10/22/2019   Able to walk? Yes   Fall in past 12 months? No   Fall with injury? -   Number of falls in past 12 months -   Fall Risk Score -           Coordination of Care:  1. Have you been to the ER, urgent care clinic since your last visit? Hospitalized since your last visit? no    2. Have you seen or consulted any other health care providers outside of the [address] [place] [address] since your last visit? Include any pap smears or colon screening.  no

## 2019-10-22 NOTE — PROGRESS NOTES
Indio Cheney III,born 1945, is a 69 y.o. male, who is seen today for reevaluation of diabetes hyperlipidemia hypertension obesity right shoulder pain/apnea. He was diagnosed with sleep apnea in [place] nearly 30 years ago but the pressures were so high on the device they could not wear it, he felt like he could not breathe so he has not used any CPAP for many years. He finds it when he gets up in the morning he has good energy and is wide awake for a few hours and is very sleepy a lot of the rest of the day. He is sleeping better at night but only 3 to 4 hours with lorazepam and then does not sleep well. He does know how loudly snores but his wife does not think it is terribly loud because she is able to sleep through the night with him in the same room. He has had more pain in his right shoulder and would like to try some physical therapy. He is doing a better job with his diet and taking all of his medicine correctly. Past Medical History:   Diagnosis Date    Diabetes ([facility] [address]     HLD (hyperlipidemia)     Hypertension     Obesity     Sleep apnea      Current Outpatient Medications   Medication Sig Dispense Refill    metFORMIN ER (GLUCOPHAGE XR) 500 mg tablet TAKE 2 TABLETS BY MOUTH TWICE DAILY WITH MEALS 360 Tab 0    atorvastatin (LIPITOR) 20 mg tablet TAKE 1 TABLET BY MOUTH ONCE DAILY IN THE EVENING 90 Tab 3    glipiZIDE SR (GLUCOTROL XL) 10 mg CR tablet Take 1 Tab by mouth daily. 90 Tab 3    LORazepam (ATIVAN) 1 mg tablet TAKE 1 TABLET BY MOUTH ONCE DAILY AT BEDTIME AS NEEDED FOR ANXIETY 30 Tab 5    triamterene-hydroCHLOROthiazide (DYAZIDE) 37.5-25 mg per capsule TAKE ONE CAPSULE BY MOUTH ONCE DAILY 90 Cap 3     Visit Vitals  /70 (BP 1 Location: Left arm, BP Patient Position: Sitting)   Pulse 88   Temp 97.8 °F (36.6 °C) (Oral)   Resp 16   Ht 6' 1\" (1.854 m)   Wt 240 lb (108.9 kg)   SpO2 98%   BMI 31.66 kg/m²     Carotids are 2+ without bruits. Lungs are clear to percussion.   Good breath sounds with no wheezing or crackles. Heart reveals a regular rhythm with normal S1 and S2 no murmur gallop click or rub. Apical impulse is not palpable. Abdomen is soft and nontender with no hepatosplenomegaly or masses and no bruits. Extremities reveal no clubbing cyanosis or edema. Pulses are intact. There is pain with range of motion of the right shoulder along with some stiffness. Results for orders placed or performed in visit on 27/26/60   METABOLIC PANEL, COMPREHENSIVE   Result Value Ref Range    Glucose 132 (H) 65 - 99 mg/dL    BUN 21 8 - 27 mg/dL    Creatinine 0.93 0.76 - 1.27 mg/dL    GFR est non-AA 81 >59 mL/min/1.73    GFR est AA 94 >59 mL/min/1.73    BUN/Creatinine ratio 23 10 - 24    Sodium 143 134 - 144 mmol/L    Potassium 4.6 3.5 - 5.2 mmol/L    Chloride 103 96 - 106 mmol/L    CO2 24 20 - 29 mmol/L    Calcium 9.4 8.6 - 10.2 mg/dL    Protein, total 6.7 6.0 - 8.5 g/dL    Albumin 4.2 3.5 - 4.8 g/dL    GLOBULIN, TOTAL 2.5 1.5 - 4.5 g/dL    A-G Ratio 1.7 1.2 - 2.2    Bilirubin, total 0.4 0.0 - 1.2 mg/dL    Alk. phosphatase 50 39 - 117 IU/L    AST (SGOT) 17 0 - 40 IU/L    ALT (SGPT) 20 0 - 44 IU/L   LIPID PANEL   Result Value Ref Range    Cholesterol, total 140 100 - 199 mg/dL    Triglyceride 92 0 - 149 mg/dL    HDL Cholesterol 47 >39 mg/dL    VLDL, calculated 18 5 - 40 mg/dL    LDL, calculated 75 0 - 99 mg/dL   CVD REPORT   Result Value Ref Range    INTERPRETATION Note    HEMOGLOBIN A1C WITH EAG   Result Value Ref Range    Hemoglobin A1c 7.1 (H) 4.8 - 5.6 %    Estimated average glucose 157 mg/dL   DIABETES PATIENT EDUCATION   Result Value Ref Range    PDF Image Not applicable      Assessment: #1.  Diabetes much better controlled, he will continue metformin 1000 mg twice a day and glipizide SR 10 mg daily and continue to work on diabetic weight loss diet. #2. Hyperlipidemia doing well, he will continue atorvastatin 20 mg daily. #3.   Hypertension well controlled, he will continue Dyazide 1 daily. #4.  Obstructive sleep apnea, he agrees to see a physician locally for retesting, I have told him the equipment available now has changed considerably over the last 30 years and hopefully something to help control sleep apnea we will be tolerable for him. #5.  Obesity, of encouraged him to continue working on weight loss. #6.  Right shoulder pain, he prefers to try physical therapy rather than seeing an orthopedist.  I will order physical therapy and he will schedule that on his own, he prefers an outside facility which is much cheaper than [employer], about half the cost per physical therapy session. Follow-up 4 months with lab or sooner if needed    Eron Brian MD FACP    Please note: This document has been produced using voice recognition software. Unrecognized errors in transcription may be present. This visit lasted 40 minutes, greater than 50% of the time was spent counseling as detailed above, regarding her diabetes and its treatment with medicine and diet, methods to lose weight, expectations for physical therapy shoulder, the need for repeat testing for sleep apnea and trial of new CPAP devices.

## 2019-12-02 RX ORDER — METFORMIN HYDROCHLORIDE 500 MG/1
TABLET, EXTENDED RELEASE ORAL
Qty: 360 TAB | Refills: 0 | Status: SHIPPED | OUTPATIENT
Start: 2019-12-02 | End: 2020-03-02

## 2020-01-08 RX ORDER — TRIAMTERENE AND HYDROCHLOROTHIAZIDE 37.5; 25 MG/1; MG/1
CAPSULE ORAL
Qty: 90 CAP | Refills: 0 | Status: SHIPPED | OUTPATIENT
Start: 2020-01-08 | End: 2020-04-13

## 2020-02-21 ENCOUNTER — HOSPITAL ENCOUNTER (OUTPATIENT)
Dept: LAB | Age: 75
Discharge: HOME | End: 2020-02-21
Payer: MEDICARE

## 2020-02-21 ENCOUNTER — APPOINTMENT (OUTPATIENT)
Dept: INTERNAL MEDICINE CLINIC | Age: 75
End: 2020-02-21

## 2020-02-21 DIAGNOSIS — E11.9 TYPE 2 DIABETES MELLITUS WITHOUT COMPLICATION, WITHOUT LONG-TERM CURRENT USE OF INSULIN (HCC): ICD-10-CM

## 2020-02-21 LAB
ALBUMIN SERPL-MCNC: 3.9 G/DL (ref 3.4–5)
ALBUMIN/GLOB SERPL: 1.1 {RATIO} (ref 0.8–1.7)
ALP SERPL-CCNC: 63 U/L (ref 45–117)
ALT SERPL-CCNC: 27 U/L (ref 16–61)
ANION GAP SERPL CALC-SCNC: 5 MMOL/L (ref 3–18)
AST SERPL-CCNC: 14 U/L (ref 10–38)
BILIRUB SERPL-MCNC: 0.5 MG/DL (ref 0.2–1)
BUN SERPL-MCNC: 21 MG/DL (ref 7–18)
BUN/CREAT SERPL: 24 (ref 12–20)
CALCIUM SERPL-MCNC: 8.9 MG/DL (ref 8.5–10.1)
CHLORIDE SERPL-SCNC: 101 MMOL/L (ref 100–111)
CHOLEST SERPL-MCNC: 148 MG/DL
CO2 SERPL-SCNC: 33 MMOL/L (ref 21–32)
CREAT SERPL-MCNC: 0.88 MG/DL (ref 0.6–1.3)
EST. AVERAGE GLUCOSE BLD GHB EST-MCNC: 163 MG/DL
GLOBULIN SER CALC-MCNC: 3.4 G/DL (ref 2–4)
GLUCOSE SERPL-MCNC: 129 MG/DL (ref 74–99)
HBA1C MFR BLD: 7.3 % (ref 4.2–5.6)
HDLC SERPL-MCNC: 45 MG/DL (ref 40–60)
HDLC SERPL: 3.3 {RATIO} (ref 0–5)
LDLC SERPL CALC-MCNC: 81.2 MG/DL (ref 0–100)
LIPID PROFILE,FLP: NORMAL
POTASSIUM SERPL-SCNC: 4 MMOL/L (ref 3.5–5.5)
PROT SERPL-MCNC: 7.3 G/DL (ref 6.4–8.2)
SODIUM SERPL-SCNC: 139 MMOL/L (ref 136–145)
TRIGL SERPL-MCNC: 109 MG/DL (ref ?–150)
VLDLC SERPL CALC-MCNC: 21.8 MG/DL

## 2020-02-21 PROCEDURE — 80061 LIPID PANEL: CPT

## 2020-02-21 PROCEDURE — 83036 HEMOGLOBIN GLYCOSYLATED A1C: CPT

## 2020-02-21 PROCEDURE — 36415 COLL VENOUS BLD VENIPUNCTURE: CPT

## 2020-02-21 PROCEDURE — 80053 COMPREHEN METABOLIC PANEL: CPT

## 2020-02-28 ENCOUNTER — OFFICE VISIT (OUTPATIENT)
Dept: INTERNAL MEDICINE CLINIC | Age: 75
End: 2020-02-28

## 2020-02-28 VITALS
SYSTOLIC BLOOD PRESSURE: 122 MMHG | DIASTOLIC BLOOD PRESSURE: 70 MMHG | TEMPERATURE: 97.4 F | HEIGHT: 73 IN | BODY MASS INDEX: 32.71 KG/M2 | RESPIRATION RATE: 12 BRPM | HEART RATE: 88 BPM | WEIGHT: 246.8 LBS | OXYGEN SATURATION: 95 %

## 2020-02-28 DIAGNOSIS — I10 PRIMARY HYPERTENSION: ICD-10-CM

## 2020-02-28 DIAGNOSIS — E11.9 TYPE 2 DIABETES MELLITUS WITHOUT COMPLICATION, WITHOUT LONG-TERM CURRENT USE OF INSULIN (HCC): Primary | ICD-10-CM

## 2020-02-28 DIAGNOSIS — E66.9 OBESITY (BMI 30-39.9): ICD-10-CM

## 2020-02-28 DIAGNOSIS — E78.5 HYPERLIPIDEMIA LDL GOAL <100: ICD-10-CM

## 2020-02-28 NOTE — PROGRESS NOTES
Anjel Robins III,born 1945, is a 69 y.o. male, who is seen today for reevaluation of diabetes hypertension hyperlipidemia obesity. He says he is following a healthy diet but has gained 6 pounds over the last 4 months. He blames that on shoes he is wearing today, athletic shoes. He is taking his medicine correctly. He is having no chest pain or dyspnea. Past Medical History:   Diagnosis Date    Diabetes ([address]     HLD (hyperlipidemia)     Hypertension     Obesity     Sleep apnea      Current Outpatient Medications   Medication Sig Dispense Refill    triamterene-hydroCHLOROthiazide (DYAZIDE) 37.5-25 mg per capsule TAKE 1 CAPSULE BY MOUTH ONCE DAILY 90 Cap 0    LORazepam (ATIVAN) 1 mg tablet TAKE 1 TABLET BY MOUTH ONCE DAILY AT BEDTIME AS NEEDED 30 Tab 2    metFORMIN ER (GLUCOPHAGE XR) 500 mg tablet TAKE 2 TABLETS BY MOUTH TWICE DAILY WITH MEALS 360 Tab 0    atorvastatin (LIPITOR) 20 mg tablet TAKE 1 TABLET BY MOUTH ONCE DAILY IN THE EVENING 90 Tab 3    glipiZIDE SR (GLUCOTROL XL) 10 mg CR tablet Take 1 Tab by mouth daily. 90 Tab 3     Visit Vitals  /70 (BP 1 Location: Left arm, BP Patient Position: Sitting)   Pulse 88   Temp 97.4 °F (36.3 °C) (Oral)   Resp 12   Ht 6' 1\" (1.854 m)   Wt 246 lb 12.8 oz (111.9 kg)   SpO2 95%   BMI 32.56 kg/m²     Carotids are 2+ without bruits. Lungs are clear to percussion. Good breath sounds with no wheezing or crackles. Heart reveals a regular rhythm with normal S1 and S2 no murmur gallop click or rub. Apical impulse is not palpable. Abdomen is obese soft nontender with no obvious hepatosplenomegaly or masses and no bruits. Extremities reveal no clubbing cyanosis or edema pulses are 2+.     Results for orders placed or performed during the hospital encounter of 02/21/20   HEMOGLOBIN A1C WITH EAG   Result Value Ref Range    Hemoglobin A1c 7.3 (H) 4.2 - 5.6 %    Est. average glucose 325 mg/dL   METABOLIC PANEL, COMPREHENSIVE   Result Value Ref Range Sodium 139 136 - 145 mmol/L    Potassium 4.0 3.5 - 5.5 mmol/L    Chloride 101 100 - 111 mmol/L    CO2 33 (H) 21 - 32 mmol/L    Anion gap 5 3.0 - 18 mmol/L    Glucose 129 (H) 74 - 99 mg/dL    BUN 21 (H) 7.0 - 18 MG/DL    Creatinine 0.88 0.6 - 1.3 MG/DL    BUN/Creatinine ratio 24 (H) 12 - 20      GFR est AA >60 >60 ml/min/1.73m2    GFR est non-AA >60 >60 ml/min/1.73m2    Calcium 8.9 8.5 - 10.1 MG/DL    Bilirubin, total 0.5 0.2 - 1.0 MG/DL    ALT (SGPT) 27 16 - 61 U/L    AST (SGOT) 14 10 - 38 U/L    Alk. phosphatase 63 45 - 117 U/L    Protein, total 7.3 6.4 - 8.2 g/dL    Albumin 3.9 3.4 - 5.0 g/dL    Globulin 3.4 2.0 - 4.0 g/dL    A-G Ratio 1.1 0.8 - 1.7     LIPID PANEL   Result Value Ref Range    LIPID PROFILE          Cholesterol, total 148 <200 MG/DL    Triglyceride 109 <150 MG/DL    HDL Cholesterol 45 40 - 60 MG/DL    LDL, calculated 81.2 0 - 100 MG/DL    VLDL, calculated 21.8 MG/DL    CHOL/HDL Ratio 3.3 0 - 5.0       Assessment: #1.  Diabetes adequately controlled, he will continue metformin ER 1000 mg twice a day and glipizide SR 10 mg daily. He will follow his diabetic diet. #2. Hyperlipidemia doing well, cholesterol little higher than last visit, he will continue atorvastatin 20 mg daily. #3. Hypertension controlled, he will continue Dyazide 1 daily. #4.  Obesity up 6 pounds from 4 months ago, he is going to be working on weight loss in the coming few months. Follow-up 4 months with lab, I should be able to recommend a new doctor for him at that time, he understands that I will be retiring the end of July. Eron Ruffin MD FACP    Please note: This document has been produced using voice recognition software. Unrecognized errors in transcription may be present.

## 2020-03-16 DIAGNOSIS — F41.9 ANXIETY: ICD-10-CM

## 2020-03-16 RX ORDER — LORAZEPAM 1 MG/1
TABLET ORAL
Qty: 30 TAB | Refills: 0 | Status: SHIPPED | OUTPATIENT
Start: 2020-03-16 | End: 2020-04-13

## 2020-04-13 DIAGNOSIS — F41.9 ANXIETY: ICD-10-CM

## 2020-04-13 RX ORDER — LORAZEPAM 1 MG/1
TABLET ORAL
Qty: 30 TAB | Refills: 0 | Status: SHIPPED | OUTPATIENT
Start: 2020-04-13 | End: 2020-05-11

## 2020-04-13 RX ORDER — TRIAMTERENE AND HYDROCHLOROTHIAZIDE 37.5; 25 MG/1; MG/1
CAPSULE ORAL
Qty: 90 CAP | Refills: 0 | Status: SHIPPED | OUTPATIENT
Start: 2020-04-13 | End: 2020-06-29 | Stop reason: SDUPTHER

## 2020-05-07 ENCOUNTER — DOCUMENTATION ONLY (OUTPATIENT)
Dept: INTERNAL MEDICINE CLINIC | Age: 75
End: 2020-05-07

## 2020-05-11 DIAGNOSIS — F41.9 ANXIETY: ICD-10-CM

## 2020-05-11 RX ORDER — LORAZEPAM 1 MG/1
TABLET ORAL
Qty: 30 TAB | Refills: 0 | Status: SHIPPED | OUTPATIENT
Start: 2020-05-11 | End: 2020-06-08

## 2020-06-16 ENCOUNTER — TELEPHONE (OUTPATIENT)
Dept: INTERNAL MEDICINE CLINIC | Age: 75
End: 2020-06-16

## 2020-06-17 ENCOUNTER — TELEPHONE (OUTPATIENT)
Dept: INTERNAL MEDICINE CLINIC | Age: 75
End: 2020-06-17

## 2020-06-17 RX ORDER — METFORMIN HYDROCHLORIDE 500 MG/1
1000 TABLET ORAL 2 TIMES DAILY WITH MEALS
Qty: 63360 TAB | Refills: 3 | Status: SHIPPED | OUTPATIENT
Start: 2020-06-17 | End: 2021-06-04 | Stop reason: SDUPTHER

## 2020-06-17 NOTE — TELEPHONE ENCOUNTER
Pt was notified by [facility] DR EMILIE DIGGS that Metformin  mg (and 700 mg) has been recalled. Pt is asking what RM would advise about taking what he has on hand and what other med could he take? Please call him at [phone] or can call his wife Maritza Barroso (he gives permission to speak with her) on her cell phone [phone] which probably be best # to reach them at.

## 2020-06-18 ENCOUNTER — TELEPHONE (OUTPATIENT)
Dept: INTERNAL MEDICINE CLINIC | Age: 75
End: 2020-06-18

## 2020-06-25 ENCOUNTER — APPOINTMENT (OUTPATIENT)
Dept: INTERNAL MEDICINE CLINIC | Age: 75
End: 2020-06-25

## 2020-06-25 DIAGNOSIS — E11.9 TYPE 2 DIABETES MELLITUS WITHOUT COMPLICATION, WITHOUT LONG-TERM CURRENT USE OF INSULIN (HCC): ICD-10-CM

## 2020-06-26 LAB
ALBUMIN SERPL-MCNC: 4.1 G/DL (ref 3.7–4.7)
ALBUMIN/GLOB SERPL: 1.8 {RATIO} (ref 1.2–2.2)
ALP SERPL-CCNC: 52 IU/L (ref 39–117)
ALT SERPL-CCNC: 15 IU/L (ref 0–32)
AST SERPL-CCNC: 11 IU/L (ref 0–40)
BILIRUB SERPL-MCNC: 0.3 MG/DL (ref 0–1.2)
BUN SERPL-MCNC: 22 MG/DL (ref 8–27)
BUN/CREAT SERPL: 24 (ref 12–28)
CALCIUM SERPL-MCNC: 9.2 MG/DL (ref 8.7–10.3)
CHLORIDE SERPL-SCNC: 100 MMOL/L (ref 96–106)
CHOLEST SERPL-MCNC: 138 MG/DL (ref 100–199)
CO2 SERPL-SCNC: 25 MMOL/L (ref 20–29)
CREAT SERPL-MCNC: 0.92 MG/DL (ref 0.57–1)
EST. AVERAGE GLUCOSE BLD GHB EST-MCNC: 163 MG/DL
GLOBULIN SER CALC-MCNC: 2.3 G/DL (ref 1.5–4.5)
GLUCOSE SERPL-MCNC: 146 MG/DL (ref 65–99)
HBA1C MFR BLD: 7.3 % (ref 4.8–5.6)
HDLC SERPL-MCNC: 43 MG/DL
INTERPRETATION, 910389: NORMAL
LDLC SERPL CALC-MCNC: 77 MG/DL (ref 0–99)
Lab: NORMAL
POTASSIUM SERPL-SCNC: 4.5 MMOL/L (ref 3.5–5.2)
PROT SERPL-MCNC: 6.4 G/DL (ref 6–8.5)
SODIUM SERPL-SCNC: 142 MMOL/L (ref 134–144)
TRIGL SERPL-MCNC: 89 MG/DL (ref 0–149)
VLDLC SERPL CALC-MCNC: 18 MG/DL (ref 5–40)

## 2020-06-29 ENCOUNTER — OFFICE VISIT (OUTPATIENT)
Dept: INTERNAL MEDICINE CLINIC | Age: 75
End: 2020-06-29

## 2020-06-29 VITALS
HEIGHT: 73 IN | BODY MASS INDEX: 32.47 KG/M2 | DIASTOLIC BLOOD PRESSURE: 72 MMHG | OXYGEN SATURATION: 96 % | HEART RATE: 79 BPM | WEIGHT: 245 LBS | RESPIRATION RATE: 12 BRPM | TEMPERATURE: 97.4 F | SYSTOLIC BLOOD PRESSURE: 118 MMHG

## 2020-06-29 DIAGNOSIS — E11.9 TYPE 2 DIABETES MELLITUS WITHOUT COMPLICATION, WITHOUT LONG-TERM CURRENT USE OF INSULIN (HCC): Primary | ICD-10-CM

## 2020-06-29 DIAGNOSIS — E78.5 HYPERLIPIDEMIA LDL GOAL <100: ICD-10-CM

## 2020-06-29 DIAGNOSIS — I10 PRIMARY HYPERTENSION: ICD-10-CM

## 2020-06-29 RX ORDER — GLIPIZIDE 10 MG/1
10 TABLET, FILM COATED, EXTENDED RELEASE ORAL DAILY
Qty: 90 TAB | Refills: 3 | Status: SHIPPED | OUTPATIENT
Start: 2020-06-29 | End: 2021-06-23 | Stop reason: SDUPTHER

## 2020-06-29 RX ORDER — TRIAMTERENE AND HYDROCHLOROTHIAZIDE 37.5; 25 MG/1; MG/1
1 CAPSULE ORAL DAILY
Qty: 90 CAP | Refills: 3 | Status: SHIPPED | OUTPATIENT
Start: 2020-06-29 | End: 2021-06-23 | Stop reason: SDUPTHER

## 2020-06-29 NOTE — PROGRESS NOTES
Marty Delvalle III,born 1945, is a 69 y.o. male, who is seen today for reevaluation of diabetes hyperlipidemia hypertension mild obesity. He feels well and tries to follow a healthy diet but weight is unchanged over the last 4 months. He takes his medicine regularly. He is having no chest pain or dyspnea or other new symptoms. Past Medical History:   Diagnosis Date    Diabetes ([address]     HLD (hyperlipidemia)     Hypertension     Obesity     Sleep apnea      Current Outpatient Medications   Medication Sig Dispense Refill    glipiZIDE SR (GLUCOTROL XL) 10 mg CR tablet Take 1 Tab by mouth daily. 90 Tab 3    triamterene-hydroCHLOROthiazide (DYAZIDE) 37.5-25 mg per capsule Take 1 Cap by mouth daily. 90 Cap 3    metFORMIN (GLUCOPHAGE) 500 mg tablet Take 2 Tabs by mouth two (2) times daily (with meals). 00351 Tab 3    LORazepam (ATIVAN) 1 mg tablet TAKE 1 TABLET BY MOUTH AT BEDTIME AS NEEDED 30 Tab 5    atorvastatin (LIPITOR) 20 mg tablet TAKE 1 TABLET BY MOUTH ONCE DAILY IN THE EVENING 90 Tab 3     Visit Vitals  /72 (BP 1 Location: Left arm, BP Patient Position: Sitting)   Pulse 79   Temp 97.4 °F (36.3 °C) (Oral)   Resp 12   Ht 6' 1\" (1.854 m)   Wt 245 lb (111.1 kg)   SpO2 96%   BMI 32.32 kg/m²     Carotids are 2+ without bruits. Lungs are clear to percussion. Good breath sounds with no wheezing or crackles. Heart reveals a regular rhythm with normal S1 and S2 no murmur gallop click or rub. Apical impulse is not palpable. Abdomen is soft and nontender with no hepatosplenomegaly or masses and no bruits. Extremities reveal no clubbing cyanosis or edema. Pulses are 2+. His back reveals numerous seborrheic keratoses but no suspicious growths which I explained to him today are benign.     Results for orders placed or performed in visit on 04/13/70   METABOLIC PANEL, COMPREHENSIVE   Result Value Ref Range    Glucose 146 (H) 65 - 99 mg/dL    BUN 22 8 - 27 mg/dL    Creatinine 0.92 0.57 - 1.00 mg/dL    GFR est non-AA 62 >59 mL/min/1.73    GFR est AA 71 >59 mL/min/1.73    BUN/Creatinine ratio 24 12 - 28    Sodium 142 134 - 144 mmol/L    Potassium 4.5 3.5 - 5.2 mmol/L    Chloride 100 96 - 106 mmol/L    CO2 25 20 - 29 mmol/L    Calcium 9.2 8.7 - 10.3 mg/dL    Protein, total 6.4 6.0 - 8.5 g/dL    Albumin 4.1 3.7 - 4.7 g/dL    GLOBULIN, TOTAL 2.3 1.5 - 4.5 g/dL    A-G Ratio 1.8 1.2 - 2.2    Bilirubin, total 0.3 0.0 - 1.2 mg/dL    Alk. phosphatase 52 39 - 117 IU/L    AST (SGOT) 11 0 - 40 IU/L    ALT (SGPT) 15 0 - 32 IU/L   HEMOGLOBIN A1C WITH EAG   Result Value Ref Range    Hemoglobin A1c 7.3 (H) 4.8 - 5.6 %    Estimated average glucose 163 mg/dL   LIPID PANEL   Result Value Ref Range    Cholesterol, total 138 100 - 199 mg/dL    Triglyceride 89 0 - 149 mg/dL    HDL Cholesterol 43 >39 mg/dL    VLDL, calculated 18 5 - 40 mg/dL    LDL, calculated 77 0 - 99 mg/dL   CVD REPORT   Result Value Ref Range    INTERPRETATION Note    DIABETES PATIENT EDUCATION   Result Value Ref Range    PDF Image Not applicable      Assessment: #1.  Diabetes quite well controlled for this age group, he will continue glipizide SR 10 mg daily and metformin 1000 mg twice a day. #2. Hyperlipidemia doing well, he will continue atorvastatin 20 mg daily. #3. Hypertension is controlled, he will continue Dyazide 1 daily. #4.  Mild obesity, of asked him again today to do the very best he can to keep his weight down and perhaps lose a few pounds over the next several months. Follow-up with lab in 4 months with Dr. Dian Cosme. Eron Gómez MD FACP    Please note: This document has been produced using voice recognition software. Unrecognized errors in transcription may be present.

## 2020-07-12 RX ORDER — ATORVASTATIN CALCIUM 20 MG/1
TABLET, FILM COATED ORAL
Qty: 90 TAB | Refills: 0 | Status: SHIPPED | OUTPATIENT
Start: 2020-07-12 | End: 2020-09-14 | Stop reason: SDUPTHER

## 2020-07-28 ENCOUNTER — TELEPHONE (OUTPATIENT)
Dept: INTERNAL MEDICINE CLINIC | Age: 75
End: 2020-07-28

## 2020-07-28 DIAGNOSIS — M79.671 PAIN OF RIGHT HEEL: Primary | ICD-10-CM

## 2020-07-28 NOTE — TELEPHONE ENCOUNTER
Wife calling, says pt has been having heel pain. He fell and has now extended the back of his right heel. They called [facility] to schedule appt but they need a referral before they will schedule.     Please send referral. Fax is [phone] per wife

## 2020-09-14 RX ORDER — ATORVASTATIN CALCIUM 20 MG/1
20 TABLET, FILM COATED ORAL EVERY EVENING
Qty: 90 TAB | Refills: 1 | Status: SHIPPED | OUTPATIENT
Start: 2020-09-14 | End: 2021-04-18

## 2020-10-08 ENCOUNTER — TELEPHONE (OUTPATIENT)
Dept: INTERNAL MEDICINE CLINIC | Age: 75
End: 2020-10-08

## 2020-10-15 ENCOUNTER — APPOINTMENT (OUTPATIENT)
Dept: INTERNAL MEDICINE CLINIC | Age: 75
End: 2020-10-15

## 2020-10-15 DIAGNOSIS — E78.5 HYPERLIPIDEMIA LDL GOAL <100: ICD-10-CM

## 2020-10-15 DIAGNOSIS — E11.9 TYPE 2 DIABETES MELLITUS WITHOUT COMPLICATION, WITHOUT LONG-TERM CURRENT USE OF INSULIN (HCC): ICD-10-CM

## 2020-10-16 LAB
ALBUMIN SERPL-MCNC: 4.6 G/DL (ref 3.7–4.7)
ALBUMIN/GLOB SERPL: 1.9 {RATIO} (ref 1.2–2.2)
ALP SERPL-CCNC: 57 IU/L (ref 39–117)
ALT SERPL-CCNC: 26 IU/L (ref 0–44)
AST SERPL-CCNC: 18 IU/L (ref 0–40)
BILIRUB SERPL-MCNC: 0.3 MG/DL (ref 0–1.2)
BUN SERPL-MCNC: 20 MG/DL (ref 8–27)
BUN/CREAT SERPL: 21 (ref 10–24)
CALCIUM SERPL-MCNC: 10 MG/DL (ref 8.6–10.2)
CHLORIDE SERPL-SCNC: 100 MMOL/L (ref 96–106)
CHOLEST SERPL-MCNC: 154 MG/DL (ref 100–199)
CO2 SERPL-SCNC: 22 MMOL/L (ref 20–29)
CREAT SERPL-MCNC: 0.96 MG/DL (ref 0.76–1.27)
EST. AVERAGE GLUCOSE BLD GHB EST-MCNC: 169 MG/DL
GLOBULIN SER CALC-MCNC: 2.4 G/DL (ref 1.5–4.5)
GLUCOSE SERPL-MCNC: 123 MG/DL (ref 65–99)
HBA1C MFR BLD: 7.5 % (ref 4.8–5.6)
HDLC SERPL-MCNC: 46 MG/DL
INTERPRETATION, 910389: NORMAL
LDLC SERPL CALC-MCNC: 92 MG/DL (ref 0–99)
Lab: NORMAL
POTASSIUM SERPL-SCNC: 4.3 MMOL/L (ref 3.5–5.2)
PROT SERPL-MCNC: 7 G/DL (ref 6–8.5)
SODIUM SERPL-SCNC: 139 MMOL/L (ref 134–144)
TRIGL SERPL-MCNC: 85 MG/DL (ref 0–149)
VLDLC SERPL CALC-MCNC: 16 MG/DL (ref 5–40)

## 2020-10-21 ENCOUNTER — OFFICE VISIT (OUTPATIENT)
Dept: INTERNAL MEDICINE CLINIC | Age: 75
End: 2020-10-21
Payer: MEDICARE

## 2020-10-21 VITALS
HEIGHT: 73 IN | RESPIRATION RATE: 20 BRPM | HEART RATE: 71 BPM | BODY MASS INDEX: 32.07 KG/M2 | OXYGEN SATURATION: 96 % | TEMPERATURE: 96.9 F | DIASTOLIC BLOOD PRESSURE: 84 MMHG | SYSTOLIC BLOOD PRESSURE: 132 MMHG | WEIGHT: 242 LBS

## 2020-10-21 DIAGNOSIS — E11.9 TYPE 2 DIABETES MELLITUS WITHOUT COMPLICATION, WITHOUT LONG-TERM CURRENT USE OF INSULIN (HCC): ICD-10-CM

## 2020-10-21 DIAGNOSIS — I10 ESSENTIAL HYPERTENSION WITH GOAL BLOOD PRESSURE LESS THAN 140/90: ICD-10-CM

## 2020-10-21 DIAGNOSIS — F41.9 ANXIETY: ICD-10-CM

## 2020-10-21 DIAGNOSIS — L72.9 SKIN CYST: ICD-10-CM

## 2020-10-21 DIAGNOSIS — E78.5 HYPERLIPIDEMIA LDL GOAL <100: ICD-10-CM

## 2020-10-21 DIAGNOSIS — Z00.00 MEDICARE ANNUAL WELLNESS VISIT, SUBSEQUENT: Primary | ICD-10-CM

## 2020-10-21 PROCEDURE — 3051F HG A1C>EQUAL 7.0%<8.0%: CPT | Performed by: INTERNAL MEDICINE

## 2020-10-21 PROCEDURE — G8752 SYS BP LESS 140: HCPCS | Performed by: INTERNAL MEDICINE

## 2020-10-21 PROCEDURE — 3017F COLORECTAL CA SCREEN DOC REV: CPT | Performed by: INTERNAL MEDICINE

## 2020-10-21 PROCEDURE — G8417 CALC BMI ABV UP PARAM F/U: HCPCS | Performed by: INTERNAL MEDICINE

## 2020-10-21 PROCEDURE — 1101F PT FALLS ASSESS-DOCD LE1/YR: CPT | Performed by: INTERNAL MEDICINE

## 2020-10-21 PROCEDURE — G0439 PPPS, SUBSEQ VISIT: HCPCS | Performed by: INTERNAL MEDICINE

## 2020-10-21 PROCEDURE — G8432 DEP SCR NOT DOC, RNG: HCPCS | Performed by: INTERNAL MEDICINE

## 2020-10-21 PROCEDURE — G8536 NO DOC ELDER MAL SCRN: HCPCS | Performed by: INTERNAL MEDICINE

## 2020-10-21 PROCEDURE — G8427 DOCREV CUR MEDS BY ELIG CLIN: HCPCS | Performed by: INTERNAL MEDICINE

## 2020-10-21 PROCEDURE — 99214 OFFICE O/P EST MOD 30 MIN: CPT | Performed by: INTERNAL MEDICINE

## 2020-10-21 PROCEDURE — G8754 DIAS BP LESS 90: HCPCS | Performed by: INTERNAL MEDICINE

## 2020-10-21 PROCEDURE — 2022F DILAT RTA XM EVC RTNOPTHY: CPT | Performed by: INTERNAL MEDICINE

## 2020-10-21 NOTE — PROGRESS NOTES
Patient is in the office today for a follow up. 1. Have you been to the ER, urgent care clinic since your last visit? Hospitalized since your last visit? No    2. Have you seen or consulted any other health care providers outside of the [address] [place] [address] since your last visit? Include any pap smears or colon screening. yes, Dr. Kalen Pierre podiatry, and [facility] Dr Gris Luong.

## 2020-10-21 NOTE — PATIENT INSTRUCTIONS
High Blood Pressure: Care Instructions Overview It's normal for blood pressure to go up and down throughout the day. But if it stays up, you have high blood pressure. Another name for high blood pressure is hypertension. Despite what a lot of people think, high blood pressure usually doesn't cause headaches or make you feel dizzy or lightheaded. It usually has no symptoms. But it does increase your risk of stroke, heart attack, and other problems. You and your doctor will talk about your risks of these problems based on your blood pressure. Your doctor will give you a goal for your blood pressure. Your goal will be based on your health and your age. Lifestyle changes, such as eating healthy and being active, are always important to help lower blood pressure. You might also take medicine to reach your blood pressure goal. 
Follow-up care is a key part of your treatment and safety. Be sure to make and go to all appointments, and call your doctor if you are having problems. It's also a good idea to know your test results and keep a list of the medicines you take. How can you care for yourself at home? Medical treatment · If you stop taking your medicine, your blood pressure will go back up. You may take one or more types of medicine to lower your blood pressure. Be safe with medicines. Take your medicine exactly as prescribed. Call your doctor if you think you are having a problem with your medicine. · Talk to your doctor before you start taking aspirin every day. Aspirin can help certain people lower their risk of a heart attack or stroke. But taking aspirin isn't right for everyone, because it can cause serious bleeding. · See your doctor regularly. You may need to see the doctor more often at first or until your blood pressure comes down. · If you are taking blood pressure medicine, talk to your doctor before you take decongestants or anti-inflammatory medicine, such as ibuprofen. Some of these medicines can raise blood pressure. · Learn how to check your blood pressure at home. Lifestyle changes · Stay at a healthy weight. This is especially important if you put on weight around the waist. Losing even 10 pounds can help you lower your blood pressure. · If your doctor recommends it, get more exercise. Walking is a good choice. Bit by bit, increase the amount you walk every day. Try for at least 30 minutes on most days of the week. You also may want to swim, bike, or do other activities. · Avoid or limit alcohol. Talk to your doctor about whether you can drink any alcohol. · Try to limit how much sodium you eat to less than 2,300 milligrams (mg) a day. Your doctor may ask you to try to eat less than 1,500 mg a day. · Eat plenty of fruits (such as bananas and oranges), vegetables, legumes, whole grains, and low-fat dairy products. · Lower the amount of saturated fat in your diet. Saturated fat is found in animal products such as milk, cheese, and meat. Limiting these foods may help you lose weight and also lower your risk for heart disease. · Do not smoke. Smoking increases your risk for heart attack and stroke. If you need help quitting, talk to your doctor about stop-smoking programs and medicines. These can increase your chances of quitting for good. When should you call for help? Call  911 anytime you think you may need emergency care. This may mean having symptoms that suggest that your blood pressure is causing a serious heart or blood vessel problem. Your blood pressure may be over 180/120. For example, call 911 if: 
  · You have symptoms of a heart attack. These may include: 
? Chest pain or pressure, or a strange feeling in the chest. 
? Sweating. ? Shortness of breath. ? Nausea or vomiting. ? Pain, pressure, or a strange feeling in the back, neck, jaw, or upper belly or in one or both shoulders or arms. ? Lightheadedness or sudden weakness. ? A fast or irregular heartbeat.  
  · You have symptoms of a stroke. These may include: 
? Sudden numbness, tingling, weakness, or loss of movement in your face, arm, or leg, especially on only one side of your body. ? Sudden vision changes. ? Sudden trouble speaking. ? Sudden confusion or trouble understanding simple statements. ? Sudden problems with walking or balance. ? A sudden, severe headache that is different from past headaches.  
  · You have severe back or belly pain. Do not wait until your blood pressure comes down on its own. Get help right away. Call your doctor now or seek immediate care if: 
  · Your blood pressure is much higher than normal (such as 180/120 or higher), but you don't have symptoms.  
  · You think high blood pressure is causing symptoms, such as: 
? Severe headache. 
? Blurry vision. Watch closely for changes in your health, and be sure to contact your doctor if: 
  · Your blood pressure measures higher than your doctor recommends at least 2 times. That means the top number is higher or the bottom number is higher, or both.  
  · You think you may be having side effects from your blood pressure medicine. Where can you learn more? Go to http://www.gray.com/ Enter K815 in the search box to learn more about \"High Blood Pressure: Care Instructions. \" Current as of: December 16, 2019               Content Version: 12.6 © 6122-8988 [employer]. Care instructions adapted under license by [employer] (which disclaims liability or warranty for this information). If you have questions about a medical condition or this instruction, always ask your healthcare professional. [place] [address] any warranty or liability for your use of this information. Medicare Wellness Visit, Male The best way to live healthy is to have a lifestyle where you eat a well-balanced diet, exercise regularly, limit alcohol use, and quit all forms of tobacco/nicotine, if applicable. Regular preventive services are another way to keep healthy. Preventive services (vaccines, screening tests, monitoring & exams) can help personalize your care plan, which helps you manage your own care. Screening tests can find health problems at the earliest stages, when they are easiest to treat. [facility] follows the current, evidence-based guidelines published by the [facility] Jevon Dom ([facility]) when recommending preventive services for our patients. Because we follow these guidelines, sometimes recommendations change over time as research supports it. (For example, a prostate screening blood test is no longer routinely recommended for men with no symptoms). Of course, you and your doctor may decide to screen more often for some diseases, based on your risk and co-morbidities (chronic disease you are already diagnosed with). Preventive services for you include: - Medicare offers their members a free annual wellness visit, which is time for you and your primary care provider to discuss and plan for your preventive service needs. Take advantage of this benefit every year! 
-All adults over age 72 should receive the recommended pneumonia vaccines. Current USPSTF guidelines recommend a series of two vaccines for the best pneumonia protection.  
-All adults should have a flu vaccine yearly and tetanus vaccine every 10 years. 
-All adults age 48 and older should receive the shingles vaccines (series of two vaccines). -All adults age 38-68 who are overweight should have a diabetes screening test once every three years.  
-Other screening tests & preventive services for persons with diabetes include: an eye exam to screen for diabetic retinopathy, a kidney function test, a foot exam, and stricter control over your cholesterol. -Cardiovascular screening for adults with routine risk involves an electrocardiogram (ECG) at intervals determined by the provider.  
-Colorectal cancer screening should be done for adults age 54-65 with no increased risk factors for colorectal cancer. There are a number of acceptable methods of screening for this type of cancer. Each test has its own benefits and drawbacks. Discuss with your provider what is most appropriate for you during your annual wellness visit. The different tests include: colonoscopy (considered the best screening method), a fecal occult blood test, a fecal DNA test, and sigmoidoscopy. 
-All adults born between [facility] should be screened once for Hepatitis C. 
-An Abdominal Aortic Aneurysm (AAA) Screening is recommended for men age 73-68 who has ever smoked in their lifetime. Here is a list of your current Health Maintenance items (your personalized list of preventive services) with a due date: 
Health Maintenance Due Topic Date Due  
 DTaP/Tdap/Td  (1 - Tdap) 08/21/1967  AAA Screening  08/21/2011 [address] [place] [address] Diabetic Foot Care  07/17/2020

## 2020-10-21 NOTE — PROGRESS NOTES
This is the Subsequent Medicare Annual Wellness Exam, performed 12 months or more after the Initial AWV or the last Subsequent AWV    I have reviewed the patient's medical history in detail and updated the computerized patient record. History     Patient Active Problem List   Diagnosis Code    Obesity E66.9    Hyperlipidemia LDL goal <100 E78.5    Primary hypertension I10    Primary insomnia F51.01    Elevated PSA, less than 10 ng/ml R97.20    Type 2 diabetes mellitus without complication, without long-term current use of insulin (HCC) E11.9    Obstructive sleep apnea syndrome G47.33    Chronic insomnia F51.04    Obesity (BMI 30-39. 9) E66.9     Past Medical History:   Diagnosis Date    Diabetes ([address]     HLD (hyperlipidemia)     Hypertension     Obesity     Sleep apnea       Past Surgical History:   Procedure Laterality Date    HX COLONOSCOPY  4/30/15    adenomatous polyps, 5 years. Dr. Gwyn Gay    HX HEENT      tooth extraction     Current Outpatient Medications   Medication Sig Dispense Refill    atorvastatin (LIPITOR) 20 mg tablet Take 1 Tab by mouth every evening. 90 Tab 1    glipiZIDE SR (GLUCOTROL XL) 10 mg CR tablet Take 1 Tab by mouth daily. 90 Tab 3    triamterene-hydroCHLOROthiazide (DYAZIDE) 37.5-25 mg per capsule Take 1 Cap by mouth daily. 90 Cap 3    metFORMIN (GLUCOPHAGE) 500 mg tablet Take 2 Tabs by mouth two (2) times daily (with meals).  [address]343 Tab 3    LORazepam (ATIVAN) 1 mg tablet TAKE 1 TABLET BY MOUTH AT BEDTIME AS NEEDED 30 Tab 5     Allergies   Allergen Reactions    Ace Inhibitors Cough       Family History   Problem Relation Age of Onset    Cancer Mother         ovarian cancer    Diabetes Father     Heart Disease Father     Cancer Maternal Grandfather         unknown type    Cancer Sister         \"everywhere\"     Social History     Tobacco Use    Smoking status: Former Smoker     Packs/day: 1.00     Years: 12.00     Pack years: 12.00     Last attempt to quit: 1978     Years since quittin.7    Smokeless tobacco: Never Used   Substance Use Topics    Alcohol use: Yes     Alcohol/week: 0.0 standard drinks     Types: 1 - 2 Cans of beer per week       Depression Risk Factor Screening:     3 most recent PHQ Screens 2020   Little interest or pleasure in doing things Not at all   Feeling down, depressed, irritable, or hopeless Not at all   Total Score PHQ 2 0       Alcohol Risk Screen   Do you average more than 1 drink per night or more than 7 drinks a week: Patient states he drinks an occasional beer. In the past three months have you have had more than 4 drinks containing alcohol on one occasion: No        Functional Ability and Level of Safety:   Hearing: Hearing is good. The patient wears hearing aids. Activities of Daily Living: The home contains: grab bars  Patient does total self care     Ambulation: with no difficulty     Fall Risk:  Fall Risk Assessment, last 12 mths 2020   Able to walk? Yes   Fall in past 12 months? No   Fall with injury? -   Number of falls in past 12 months -   Fall Risk Score -     Abuse Screen:  Patient is not abused       Cognitive Screening   Has your family/caregiver stated any concerns about your memory: no     Cognitive Screening: Normal - .     Patient Care Team   Patient Care Team:  oDng Yepez MD as PCP - General (Internal Medicine)  Dong Yepez MD as PCP - [facility] Empaneled Provider  Zach Harrell MD (Gastroenterology)  Tiffanie Jones, RN as [address] [place] [address] (Internal Medicine)  Matt Fletcher DO (Pulmonary Disease)    Glaucoma Screening- MYEYEDR  Pneumonia Vaccine-  UTD  Shingles Vaccine- UTD  Tdap Vaccine- not UTD  Colonoscopy-  Dr Renu Mckinney 2020 f/u in 3 years  PSA- 2019 3.5   Advance Directive-  Pt made aware and has information     Assessment/Plan   Education and counseling provided:  Are appropriate based on today's review and evaluation  End-of-Life planning (with patient's consent)    Diagnoses and all orders for this visit:    1. Medicare annual wellness visit, subsequent    2. Type 2 diabetes mellitus without complication, without long-term current use of insulin (HCC)  -     HEMOGLOBIN A1C W/O EAG; Future    3. Hyperlipidemia LDL goal <100  -     LIPID PANEL; Future    4. Essential hypertension with goal blood pressure less than 174/33  -     METABOLIC PANEL, COMPREHENSIVE; Future    5. Anxiety    6. Skin cyst  -     REFERRAL TO DERMATOLOGY        Health Maintenance Due   Topic Date Due    DTaP/Tdap/Td series (1 - Tdap) 08/21/1967    AAA Screening 73-67 YO Male Smoking Patients  08/21/2011    Foot Exam Q1  07/17/2020     A comprehensive 5 year plan for medical care and screening exams was reviewed with pt and they received a copy of it.

## 2020-10-29 NOTE — PROGRESS NOTES
Subjective:       Chief Complaint  The patient presents for follow up of diabetes, hypertension and high cholesterol. RANJANA Birch is a 77 y.o. male seen for follow up of diabetes. Luisana has hypertension and hyperlipidemia. Diabetes no significant medication side effects noted, borderline controlled, on Glucotrol Xl 10 mg and metformin 1 gm BID, hypertension well controlled, no significant medication side effects noted, on Dyazide, hyperlipidemia reasonably well controlled, no significant medication side effects noted, on Lipitor 20 mg. Diet and Lifestyle: generally follows a low fat low cholesterol diet, follows a diabetic diet regularly, exercises sporadically    Home BP Monitoring: is not measured at home. Diabetic Review of Systems - home glucose monitoring: is not performed. Other symptoms and concerns: Anxiety Review:  Patient is seen for anxiety disorder. Current treatment includes Ativan and no other therapies. Ongoing symptoms include: none. Patient denies: palpitations, racing thoughts. Reported side effects from the treatment: none. Patient is mainly using the Ativan to help sleep at night. Patient has sleep apnea but is not using CPAP machine    Patient has multiple cysts on his skin for which we will refer to dermatology for further evaluation. Discussed the patient's BMI with him. The BMI follow up plan is as follows: I have counseled this patient on diet and exercise regimens. Current Outpatient Medications   Medication Sig    atorvastatin (LIPITOR) 20 mg tablet Take 1 Tab by mouth every evening.  glipiZIDE SR (GLUCOTROL XL) 10 mg CR tablet Take 1 Tab by mouth daily.  triamterene-hydroCHLOROthiazide (DYAZIDE) 37.5-25 mg per capsule Take 1 Cap by mouth daily.  metFORMIN (GLUCOPHAGE) 500 mg tablet Take 2 Tabs by mouth two (2) times daily (with meals).     LORazepam (ATIVAN) 1 mg tablet TAKE 1 TABLET BY MOUTH AT BEDTIME AS NEEDED     No current facility-administered medications for this visit. Review of Systems  Respiratory: negative for dyspnea on exertion  Cardiovascular: negative for chest pain    Objective:     Visit Vitals  /84 (BP 1 Location: Left arm, BP Patient Position: Sitting)   Pulse 71   Temp 96.9 °F (36.1 °C) (Temporal)   Resp 20   Ht 6' 1\" (1.854 m)   Wt 242 lb (109.8 kg)   SpO2 96%   BMI 31.93 kg/m²        Chest - clear to auscultation, no wheezes, rales or rhonchi, symmetric air entry  Heart - normal rate, regular rhythm, normal S1, S2, no murmurs, rubs, clicks or gallops  Extremities - peripheral pulses normal, no pedal edema, no clubbing or cyanosis      Labs:   Lab Results   Component Value Date/Time    Cholesterol, total 154 10/15/2020 10:42 AM    HDL Cholesterol 46 10/15/2020 10:42 AM    LDL, calculated 77 06/25/2020 08:26 AM    LDL Chol Calc (NIH) 92 10/15/2020 10:42 AM    Triglyceride 85 10/15/2020 10:42 AM    CHOL/HDL Ratio 3.3 02/21/2020 08:20 AM     Lab Results   Component Value Date/Time    Prostate Specific Ag 3.5 07/03/2019 08:47 AM    Prostate Specific Ag 3.4 06/27/2018 07:35 AM    Prostate Specific Ag 2.4 12/09/2016 09:49 AM    Prostate Specific Ag 2.0 01/10/2011    Prostate Specific Ag 2.0 01/18/2010 10:50 AM     Lab Results   Component Value Date/Time    Sodium 139 10/15/2020 10:42 AM    Potassium 4.3 10/15/2020 10:42 AM    Chloride 100 10/15/2020 10:42 AM    CO2 22 10/15/2020 10:42 AM    Anion gap 5 02/21/2020 08:20 AM    Glucose 123 (H) 10/15/2020 10:42 AM    BUN 20 10/15/2020 10:42 AM    Creatinine 0.96 10/15/2020 10:42 AM    BUN/Creatinine ratio 21 10/15/2020 10:42 AM    GFR est AA 90 10/15/2020 10:42 AM    GFR est non-AA 78 10/15/2020 10:42 AM    Calcium 10.0 10/15/2020 10:42 AM    Bilirubin, total 0.3 10/15/2020 10:42 AM    ALT (SGPT) 26 10/15/2020 10:42 AM    Alk.  phosphatase 57 10/15/2020 10:42 AM    Protein, total 7.0 10/15/2020 10:42 AM    Albumin 4.6 10/15/2020 10:42 AM    Globulin 3.4 02/21/2020 08:20 AM    A-G Ratio 1.9 10/15/2020 10:42 AM      Lab Results   Component Value Date/Time    Hemoglobin A1c 7.5 (H) 10/15/2020 10:42 AM            Assessment / Plan     Diabetes borderline controlled, on Glucotrol and Metformin. Patient will work on cutting back starches and sweets in his diet to improve  Hypertension fairly well controlled, on Dyazide daily  Hyperlipidemia reasonably well controlled, Lipitor 20 mg daily. ICD-10-CM ICD-9-CM    1. Medicare annual wellness visit, subsequent  Z00.00 V70.0    2. Type 2 diabetes mellitus without complication, without long-term current use of insulin (HCC)  E11.9 250.00 HEMOGLOBIN A1C W/O EAG   3. Hyperlipidemia LDL goal <100  E78.5 272.4 LIPID PANEL   4. Essential hypertension with goal blood pressure less than 140/90  C05 223.0 METABOLIC PANEL, COMPREHENSIVE   5. Anxiety  F41.9 300.00  controlled on Ativan. 6. Skin cyst  L72.9 706.2 REFERRAL TO DERMATOLOGY            Diabetic issues reviewed with him: diabetic diet discussed in detail, and low cholesterol diet, weight control and daily exercise discussed. Follow-up and Dispositions    · Return in about 4 months (around 2/21/2021) for labs 1 week before. Reviewed plan of care. Patient has provided input and agrees with goals.

## 2020-12-07 DIAGNOSIS — F41.9 ANXIETY: ICD-10-CM

## 2020-12-07 RX ORDER — LORAZEPAM 1 MG/1
1 TABLET ORAL
Qty: 30 TAB | Refills: 5 | Status: SHIPPED | OUTPATIENT
Start: 2020-12-07 | End: 2021-06-04

## 2021-01-05 ENCOUNTER — VIRTUAL VISIT (OUTPATIENT)
Dept: INTERNAL MEDICINE CLINIC | Age: 76
End: 2021-01-05
Payer: MEDICARE

## 2021-01-05 DIAGNOSIS — M70.21 OLECRANON BURSITIS OF RIGHT ELBOW: Primary | ICD-10-CM

## 2021-01-05 DIAGNOSIS — L72.0 INFECTED INCLUSION CYST: ICD-10-CM

## 2021-01-05 DIAGNOSIS — L08.9 INFECTED INCLUSION CYST: ICD-10-CM

## 2021-01-05 PROCEDURE — 1101F PT FALLS ASSESS-DOCD LE1/YR: CPT | Performed by: INTERNAL MEDICINE

## 2021-01-05 PROCEDURE — G8432 DEP SCR NOT DOC, RNG: HCPCS | Performed by: INTERNAL MEDICINE

## 2021-01-05 PROCEDURE — 99213 OFFICE O/P EST LOW 20 MIN: CPT | Performed by: INTERNAL MEDICINE

## 2021-01-05 PROCEDURE — G8536 NO DOC ELDER MAL SCRN: HCPCS | Performed by: INTERNAL MEDICINE

## 2021-01-05 PROCEDURE — G8756 NO BP MEASURE DOC: HCPCS | Performed by: INTERNAL MEDICINE

## 2021-01-05 PROCEDURE — G8417 CALC BMI ABV UP PARAM F/U: HCPCS | Performed by: INTERNAL MEDICINE

## 2021-01-05 PROCEDURE — G8427 DOCREV CUR MEDS BY ELIG CLIN: HCPCS | Performed by: INTERNAL MEDICINE

## 2021-01-05 PROCEDURE — 3017F COLORECTAL CA SCREEN DOC REV: CPT | Performed by: INTERNAL MEDICINE

## 2021-01-05 RX ORDER — METHYLPREDNISOLONE 4 MG/1
TABLET ORAL
Qty: 1 DOSE PACK | Refills: 0 | Status: SHIPPED | OUTPATIENT
Start: 2021-01-05 | End: 2021-02-24

## 2021-01-05 RX ORDER — CEPHALEXIN 500 MG/1
500 CAPSULE ORAL 3 TIMES DAILY
Qty: 21 CAP | Refills: 0 | Status: SHIPPED | OUTPATIENT
Start: 2021-01-05 | End: 2021-01-12

## 2021-01-05 NOTE — PROGRESS NOTES
Tana Loya III 77 y.o. male who was seen by synchronous (real-time) audio-video technology on 01/05/21    Consent:  heand/or his healthcare decision maker is aware that this patient-initiated Telehealth encounter is a billable service, with coverage as determined by her insurance carrier. he is aware that he may receive a bill and has provided verbal consent to proceed: Yes I was in my office while conducting this encounter. The patient was in their home. Sherice Tanner is a 77 y.o.  male and presents with Elbow Swelling (right )      SUBJECTIVE:    Pt for the last 4 weeks has had what appears to be epidermoid inclusion cyst on his right elbow. He has noticed it has become more red and he has what appears to be swelling in his olecranon bursa. He has some mild pain with palpation. He denies any fever or chills. He does have h/o DM with his BS  Fairly well controlled with Hba1c 7.5. Respiratory ROS: negative for - shortness of breath  Cardiovascular ROS: negative for - chest pain    Current Outpatient Medications   Medication Sig    methylPREDNISolone (MEDROL DOSEPACK) 4 mg tablet Take as directed    cephALEXin (KEFLEX) 500 mg capsule Take 1 Cap by mouth three (3) times daily for 7 days.  LORazepam (ATIVAN) 1 mg tablet Take 1 Tab by mouth nightly as needed for Anxiety. Max Daily Amount: 1 mg.  atorvastatin (LIPITOR) 20 mg tablet Take 1 Tab by mouth every evening.  glipiZIDE SR (GLUCOTROL XL) 10 mg CR tablet Take 1 Tab by mouth daily.  triamterene-hydroCHLOROthiazide (DYAZIDE) 37.5-25 mg per capsule Take 1 Cap by mouth daily.  metFORMIN (GLUCOPHAGE) 500 mg tablet Take 2 Tabs by mouth two (2) times daily (with meals). No current facility-administered medications for this visit. OBJECTIVE:  alert, well appearing, and in no distress  There were no vitals taken for this visit.    well developed and well nourished  Extremities - right olecranon bursa about the size of large marble and about 5 mm epidermoid inclusion cyst on top with some erythema          Labs:   Lab Results   Component Value Date/Time    Hemoglobin A1c 7.5 (H) 10/15/2020 10:42 AM          Assessment/Plan      ICD-10-CM ICD-9-CM    1. Olecranon bursitis of right elbow  M70.21 726.33 Will treat with methylPREDNISolone (MEDROL DOSEPACK) 4 mg tablet. Pt aware his BS can be elevated and he will call me if BS>250   2. Infected inclusion cyst  L72.0 706.2 Will treat with cephALEXin (KEFLEX) 500 mg capsule TID for 7 days     L08.9       Follow-up and Dispositions    · Return in about 1 week (around 1/12/2021). Reviewed plan of care. Patient has provided input and agrees with goals.

## 2021-01-08 ENCOUNTER — TELEPHONE (OUTPATIENT)
Dept: INTERNAL MEDICINE CLINIC | Age: 76
End: 2021-01-08

## 2021-01-08 NOTE — TELEPHONE ENCOUNTER
Pt called and said he is scheduled to be seen at [facility] on Monday 1/11/21 at 8:45 and that office told him they needed a referral.  Pt was referred there in Oct by Dr Allison Vaca which pt said he didn't schedule anything at that time so I printed out that referral requisition and faxed it their office.

## 2021-01-12 ENCOUNTER — VIRTUAL VISIT (OUTPATIENT)
Dept: INTERNAL MEDICINE CLINIC | Age: 76
End: 2021-01-12
Payer: MEDICARE

## 2021-01-12 DIAGNOSIS — M70.21 OLECRANON BURSITIS OF RIGHT ELBOW: Primary | ICD-10-CM

## 2021-01-12 PROCEDURE — 99213 OFFICE O/P EST LOW 20 MIN: CPT | Performed by: INTERNAL MEDICINE

## 2021-01-12 NOTE — PROGRESS NOTES
Arpit Zheng III 77 y.o. male who was seen by synchronous (real-time) audio-video technology on 01/12/21    Consent:  heand/or his healthcare decision maker is aware that this patient-initiated Telehealth encounter is a billable service, with coverage as determined by her insurance carrier. he is aware that he may receive a bill and has provided verbal consent to proceed: Yes I was in my office while conducting this encounter. The patient was in their home. Ryland Mittal is a 77 y.o.  male and presents with Elbow Swelling (right )      SUBJECTIVE:    Pt right olecranon bursa has decreased in size but still has some fluid in it after the Medrol dose love. He does have some pain in the elbow if he presses it against a surface. The infected cyst on the elbow has cleared up with antibiotics. He denies any fever or chills. Respiratory ROS: negative for - shortness of breath  Cardiovascular ROS: negative for - chest pain    Current Outpatient Medications   Medication Sig    methylPREDNISolone (MEDROL DOSEPACK) 4 mg tablet Take as directed    cephALEXin (KEFLEX) 500 mg capsule Take 1 Cap by mouth three (3) times daily for 7 days.  LORazepam (ATIVAN) 1 mg tablet Take 1 Tab by mouth nightly as needed for Anxiety. Max Daily Amount: 1 mg.  atorvastatin (LIPITOR) 20 mg tablet Take 1 Tab by mouth every evening.  glipiZIDE SR (GLUCOTROL XL) 10 mg CR tablet Take 1 Tab by mouth daily.  triamterene-hydroCHLOROthiazide (DYAZIDE) 37.5-25 mg per capsule Take 1 Cap by mouth daily.  metFORMIN (GLUCOPHAGE) 500 mg tablet Take 2 Tabs by mouth two (2) times daily (with meals). No current facility-administered medications for this visit. OBJECTIVE:  alert, well appearing, and in no distress  There were no vitals taken for this visit. well developed and well nourished  Musculoskeletal - abnormal exam of right olecranon bursa with some fluid still in it.              Assessment/Plan ICD-10-CM ICD-9-CM    1. Olecranon bursitis of right elbow  M70.21 726.33 REFERRAL TO ORTHOPEDICS for definitive management      Follow-up and Dispositions    · Return if symptoms worsen or fail to improve. Reviewed plan of care. Patient has provided input and agrees with goals.

## 2021-01-29 ENCOUNTER — OFFICE VISIT (OUTPATIENT)
Dept: ORTHOPEDIC SURGERY | Age: 76
End: 2021-01-29
Payer: MEDICARE

## 2021-01-29 VITALS
RESPIRATION RATE: 16 BRPM | BODY MASS INDEX: 33.16 KG/M2 | OXYGEN SATURATION: 97 % | WEIGHT: 250.2 LBS | HEIGHT: 73 IN | TEMPERATURE: 95.5 F | DIASTOLIC BLOOD PRESSURE: 78 MMHG | HEART RATE: 75 BPM | SYSTOLIC BLOOD PRESSURE: 158 MMHG

## 2021-01-29 DIAGNOSIS — M70.21 OLECRANON BURSITIS OF RIGHT ELBOW: ICD-10-CM

## 2021-01-29 DIAGNOSIS — M25.521 RIGHT ELBOW PAIN: Primary | ICD-10-CM

## 2021-01-29 PROCEDURE — 20605 DRAIN/INJ JOINT/BURSA W/O US: CPT | Performed by: ORTHOPAEDIC SURGERY

## 2021-01-29 PROCEDURE — G8432 DEP SCR NOT DOC, RNG: HCPCS | Performed by: ORTHOPAEDIC SURGERY

## 2021-01-29 PROCEDURE — G8754 DIAS BP LESS 90: HCPCS | Performed by: ORTHOPAEDIC SURGERY

## 2021-01-29 PROCEDURE — 73080 X-RAY EXAM OF ELBOW: CPT | Performed by: ORTHOPAEDIC SURGERY

## 2021-01-29 PROCEDURE — G8427 DOCREV CUR MEDS BY ELIG CLIN: HCPCS | Performed by: ORTHOPAEDIC SURGERY

## 2021-01-29 PROCEDURE — G8417 CALC BMI ABV UP PARAM F/U: HCPCS | Performed by: ORTHOPAEDIC SURGERY

## 2021-01-29 PROCEDURE — 99203 OFFICE O/P NEW LOW 30 MIN: CPT | Performed by: ORTHOPAEDIC SURGERY

## 2021-01-29 PROCEDURE — G8753 SYS BP > OR = 140: HCPCS | Performed by: ORTHOPAEDIC SURGERY

## 2021-01-29 PROCEDURE — G8536 NO DOC ELDER MAL SCRN: HCPCS | Performed by: ORTHOPAEDIC SURGERY

## 2021-01-29 PROCEDURE — 3017F COLORECTAL CA SCREEN DOC REV: CPT | Performed by: ORTHOPAEDIC SURGERY

## 2021-01-29 PROCEDURE — 1101F PT FALLS ASSESS-DOCD LE1/YR: CPT | Performed by: ORTHOPAEDIC SURGERY

## 2021-01-29 RX ORDER — TRIAMCINOLONE ACETONIDE 40 MG/ML
40 INJECTION, SUSPENSION INTRA-ARTICULAR; INTRAMUSCULAR ONCE
Status: COMPLETED | OUTPATIENT
Start: 2021-01-29 | End: 2021-01-29

## 2021-01-29 RX ADMIN — TRIAMCINOLONE ACETONIDE 20 MG: 40 INJECTION, SUSPENSION INTRA-ARTICULAR; INTRAMUSCULAR at 08:29

## 2021-01-29 NOTE — PROGRESS NOTES
Patient: Tin Reynoso                MRN: [id number]       SSN: [id number]  YOB: 1945        AGE: 77 y.o. SEX: male  Body mass index is 33.01 kg/m². PCP: Shahid Lindsay MD  01/29/21    CHIEF COMPLAINT: Right elbow swelling/pain    HPI: Tin Reynoso is a 77 y.o. male patient who comes to the office today with right elbow pain and swelling. He reports the pain is 4-10. He says the pain is been present for the past few weeks. He said initially there was more swelling over the posterior elbow but he took a Z-Zak prescribed by his primary doctor which helped with the pain and swelling. He denies any known trauma to the right elbow. He has full range of motion. Pain is mostly when he rests his arm down. He has not had any surgery on the right elbow or imaging. Past Medical History:   Diagnosis Date    Diabetes ([facility] [address]     HLD (hyperlipidemia)     Hypertension     Obesity     Sleep apnea        Family History   Problem Relation Age of Onset    Cancer Mother         ovarian cancer    Diabetes Father     Heart Disease Father     Cancer Maternal Grandfather         unknown type    Cancer Sister         \"everywhere\"       Current Outpatient Medications   Medication Sig Dispense Refill    methylPREDNISolone (MEDROL DOSEPACK) 4 mg tablet Take as directed 1 Dose Pack 0    LORazepam (ATIVAN) 1 mg tablet Take 1 Tab by mouth nightly as needed for Anxiety. Max Daily Amount: 1 mg. 30 Tab 5    atorvastatin (LIPITOR) 20 mg tablet Take 1 Tab by mouth every evening. 90 Tab 1    glipiZIDE SR (GLUCOTROL XL) 10 mg CR tablet Take 1 Tab by mouth daily. 90 Tab 3    triamterene-hydroCHLOROthiazide (DYAZIDE) 37.5-25 mg per capsule Take 1 Cap by mouth daily. 90 Cap 3    metFORMIN (GLUCOPHAGE) 500 mg tablet Take 2 Tabs by mouth two (2) times daily (with meals).  14478 Tab 3       Allergies   Allergen Reactions    Ace Inhibitors Cough       Past Surgical History:   Procedure Laterality Date    HX COLONOSCOPY  4/30/15    adenomatous polyps, 5 years. Dr. Savage Feliz    HX HEENT      tooth extraction       Social History     Socioeconomic History    Marital status:      Spouse name: Not on file    Number of children: Not on file    Years of education: Not on file    Highest education level: Not on file   Occupational History    Not on file   Social Needs    Financial resource strain: Not on file    Food insecurity     Worry: Not on file     Inability: Not on file    Transportation needs     Medical: Not on file     Non-medical: Not on file   Tobacco Use    Smoking status: Former Smoker     Packs/day: 1.00     Years: 12.00     Pack years: 12.00     Quit date: 1978     Years since quittin.0    Smokeless tobacco: Never Used   Substance and Sexual Activity    Alcohol use:  Yes     Alcohol/week: 0.0 standard drinks     Types: 1 - 2 Cans of beer per week    Drug use: No    Sexual activity: Yes     Partners: Female   Lifestyle    Physical activity     Days per week: Not on file     Minutes per session: Not on file    Stress: Not on file   Relationships    Social connections     Talks on phone: Not on file     Gets together: Not on file     Attends Mandaeism service: Not on file     Active member of club or organization: Not on file     Attends meetings of clubs or organizations: Not on file     Relationship status: Not on file    Intimate partner violence     Fear of current or ex partner: Not on file     Emotionally abused: Not on file     Physically abused: Not on file     Forced sexual activity: Not on file   Other Topics Concern    Not on file   Social History Narrative    Not on file       REVIEW OF SYSTEMS:      CON: negative for recent weight loss/gain, fever, or chills  EYE: negative for double or blurry vision  ENT: negative for hoarseness  RS:   negative for cough, URI, SOB  CV:  negative for chest pain, palpitations  GI:    negative for blood in stool, nausea/vomiting  :  negative for blood in urine  MS: As per HPI  Other systems reviewed and noted below. PHYSICAL EXAMINATION:  Visit Vitals  BP (!) 158/78 (BP 1 Location: Right upper arm, BP Patient Position: Sitting)   Pulse 75   Temp (!) 95.5 °F (35.3 °C) (Temporal)   Resp 16   Ht 6' 1\" (1.854 m)   Wt 250 lb 3.2 oz (113.5 kg)   SpO2 97%   BMI 33.01 kg/m²     Body mass index is 33.01 kg/m². GENERAL: Alert and oriented x3, in no acute distress, well-developed, well-nourished. HEENT: Normocephalic, atraumatic. RESP: Non labored breathing with equal chest rise on inspiration. CV: Well perfused extremities. No cyanosis or clubbing noted. ABDOMEN: Soft, non-tender, non-distended. Elbow Exam   R   L  ROM Active      Flexion   Full   Full   Extension  Full   Full   Pronation  Full   Full   Supination  Full   Full  ROM  Passive   Flexion   Full   Full   Extension  Full   Full   Pronation  Full   Full   Supination  Full   Full  Tenderness to palpation   Medial Epicondyle -   -   Lateral Epicondyle -   -  Tinel Sign Cubital Tunnel -   -  Ulnar Nerve Subluxation -   -  Distal Biceps Abnormality -   -  Triceps Abnormality  -   -  Other tenderness  -   -  Other Deformity  -   -  Olecranon Bursitis  +   -  Warmth   -   -  Erythema   -   -  Additional Findings: None      IMAGING:  X-rays 3 views of the right elbow were taken the office today. These do not show any significant bony abnormalities. There is a slight soft tissue swelling over the posterior elbow. ASSESSMENT & PLAN  Diagnosis: Right elbow olecranon bursitis    I discussed with Nitin Cruz the findings today. He has a mild case of right elbow olecranon bursitis. There is likely a slight amount of fluid in the elbow. We discussed treatment options including operative and nonoperative management today. I discussed the x-ray findings with him.   He would like to try an aspiration followed by an injection of corticosteroid into the right elbow olecranon bursa today. We did discuss if it recurs the next up would likely be is discussion of surgery to have it excised.   I will see him back as needed    [facility]  OFFICE PROCEDURE PROGRESS NOTE        Chart reviewed for the following:   Jose Lambert MD, have reviewed the History, Physical and updated the Allergic reactions for Nanda Locus III     TIME OUT performed immediately prior to start of procedure:   Jose Lambert MD, have performed the following reviews on Nanda Locus III prior to the start of the procedure:            * Patient was identified by name and date of birth   * Agreement on procedure being performed was verified  * Risks and Benefits explained to the patient  * Procedure site verified and marked as necessary  * Patient was positioned for comfort  * Consent was signed and verified    Time: 8:22 AM    Location: Right elbow     Aspiration: less than 1cc of fluid  Kenalog 20 mg with 0.5cc Lidocaine    Date of procedure: 1/29/2021    Procedure performed by:  Alejo Rodríguez MD    Provider assisted by: Danica Anders LPN    Patient assisted by: self    How tolerated by patient: tolerated the procedure well with no complications    Post Procedural Pain Scale: 0 - No Hurt    Comments: none        Electronically signed by: Alejo Rodríguez MD

## 2021-02-17 ENCOUNTER — APPOINTMENT (OUTPATIENT)
Dept: INTERNAL MEDICINE CLINIC | Age: 76
End: 2021-02-17

## 2021-02-17 DIAGNOSIS — E11.9 TYPE 2 DIABETES MELLITUS WITHOUT COMPLICATION, WITHOUT LONG-TERM CURRENT USE OF INSULIN (HCC): ICD-10-CM

## 2021-02-17 DIAGNOSIS — E78.5 HYPERLIPIDEMIA LDL GOAL <100: ICD-10-CM

## 2021-02-17 DIAGNOSIS — I10 ESSENTIAL HYPERTENSION WITH GOAL BLOOD PRESSURE LESS THAN 140/90: ICD-10-CM

## 2021-02-18 LAB
ALBUMIN SERPL-MCNC: 4.3 G/DL (ref 3.7–4.7)
ALBUMIN/GLOB SERPL: 1.7 {RATIO} (ref 1.2–2.2)
ALP SERPL-CCNC: 58 IU/L (ref 39–117)
ALT SERPL-CCNC: 16 IU/L (ref 0–44)
AST SERPL-CCNC: 15 IU/L (ref 0–40)
BILIRUB SERPL-MCNC: 0.4 MG/DL (ref 0–1.2)
BUN SERPL-MCNC: 23 MG/DL (ref 8–27)
BUN/CREAT SERPL: 21 (ref 10–24)
CALCIUM SERPL-MCNC: 9.6 MG/DL (ref 8.6–10.2)
CHLORIDE SERPL-SCNC: 102 MMOL/L (ref 96–106)
CHOLEST SERPL-MCNC: 155 MG/DL (ref 100–199)
CO2 SERPL-SCNC: 24 MMOL/L (ref 20–29)
CREAT SERPL-MCNC: 1.08 MG/DL (ref 0.76–1.27)
GLOBULIN SER CALC-MCNC: 2.5 G/DL (ref 1.5–4.5)
GLUCOSE SERPL-MCNC: 153 MG/DL (ref 65–99)
HBA1C MFR BLD: 7.8 % (ref 4.8–5.6)
HDLC SERPL-MCNC: 47 MG/DL
INTERPRETATION, 910389: NORMAL
LDLC SERPL CALC-MCNC: 85 MG/DL (ref 0–99)
Lab: NORMAL
POTASSIUM SERPL-SCNC: 4.6 MMOL/L (ref 3.5–5.2)
PROT SERPL-MCNC: 6.8 G/DL (ref 6–8.5)
SODIUM SERPL-SCNC: 143 MMOL/L (ref 134–144)
TRIGL SERPL-MCNC: 130 MG/DL (ref 0–149)
VLDLC SERPL CALC-MCNC: 23 MG/DL (ref 5–40)

## 2021-02-24 ENCOUNTER — OFFICE VISIT (OUTPATIENT)
Dept: INTERNAL MEDICINE CLINIC | Age: 76
End: 2021-02-24
Payer: MEDICARE

## 2021-02-24 VITALS
WEIGHT: 248 LBS | HEART RATE: 81 BPM | RESPIRATION RATE: 18 BRPM | HEIGHT: 73 IN | TEMPERATURE: 97.6 F | OXYGEN SATURATION: 96 % | BODY MASS INDEX: 32.87 KG/M2 | DIASTOLIC BLOOD PRESSURE: 89 MMHG | SYSTOLIC BLOOD PRESSURE: 155 MMHG

## 2021-02-24 DIAGNOSIS — L08.9 INFECTED CYST OF SKIN: ICD-10-CM

## 2021-02-24 DIAGNOSIS — L72.9 INFECTED CYST OF SKIN: ICD-10-CM

## 2021-02-24 DIAGNOSIS — I10 ESSENTIAL HYPERTENSION WITH GOAL BLOOD PRESSURE LESS THAN 140/90: ICD-10-CM

## 2021-02-24 DIAGNOSIS — E78.5 HYPERLIPIDEMIA LDL GOAL <100: ICD-10-CM

## 2021-02-24 DIAGNOSIS — E11.9 TYPE 2 DIABETES MELLITUS WITHOUT COMPLICATION, WITHOUT LONG-TERM CURRENT USE OF INSULIN (HCC): Primary | ICD-10-CM

## 2021-02-24 PROCEDURE — 99214 OFFICE O/P EST MOD 30 MIN: CPT | Performed by: INTERNAL MEDICINE

## 2021-02-24 PROCEDURE — 1101F PT FALLS ASSESS-DOCD LE1/YR: CPT | Performed by: INTERNAL MEDICINE

## 2021-02-24 PROCEDURE — G8417 CALC BMI ABV UP PARAM F/U: HCPCS | Performed by: INTERNAL MEDICINE

## 2021-02-24 PROCEDURE — G8427 DOCREV CUR MEDS BY ELIG CLIN: HCPCS | Performed by: INTERNAL MEDICINE

## 2021-02-24 PROCEDURE — 2022F DILAT RTA XM EVC RTNOPTHY: CPT | Performed by: INTERNAL MEDICINE

## 2021-02-24 PROCEDURE — 3017F COLORECTAL CA SCREEN DOC REV: CPT | Performed by: INTERNAL MEDICINE

## 2021-02-24 PROCEDURE — G8536 NO DOC ELDER MAL SCRN: HCPCS | Performed by: INTERNAL MEDICINE

## 2021-02-24 PROCEDURE — G8510 SCR DEP NEG, NO PLAN REQD: HCPCS | Performed by: INTERNAL MEDICINE

## 2021-02-24 PROCEDURE — G8754 DIAS BP LESS 90: HCPCS | Performed by: INTERNAL MEDICINE

## 2021-02-24 PROCEDURE — 3051F HG A1C>EQUAL 7.0%<8.0%: CPT | Performed by: INTERNAL MEDICINE

## 2021-02-24 PROCEDURE — G8753 SYS BP > OR = 140: HCPCS | Performed by: INTERNAL MEDICINE

## 2021-02-24 RX ORDER — CEPHALEXIN 500 MG/1
500 CAPSULE ORAL 3 TIMES DAILY
Qty: 30 CAP | Refills: 0 | Status: SHIPPED | OUTPATIENT
Start: 2021-02-24 | End: 2022-02-01 | Stop reason: ALTCHOICE

## 2021-02-24 NOTE — PROGRESS NOTES
Subjective:       Chief Complaint  The patient presents for follow up of diabetes, hypertension and high cholesterol. HPI  Catherine Perkins is a 77 y.o. male seen for follow up of diabetes. Healso has hypertension and hyperlipidemia. Diabetes no significant medication side effects noted, borderline controlled, on Glucotrol Xl 10 mg and metformin 1 gm BID, hypertension well controlled, no significant medication side effects noted, on Dyazide, hyperlipidemia reasonably well controlled, no significant medication side effects noted, on Lipitor 20 mg. Diet and Lifestyle: generally follows a low fat low cholesterol diet, follows a diabetic diet regularly, exercises sporadically    Home BP Monitoring: is not measured at home. Diabetic Review of Systems - home glucose monitoring: is not performed. Other symptoms and concerns: Anxiety Review:  Patient is seen for anxiety disorder. Current treatment includes Ativan and no other therapies. Ongoing symptoms include: none. Patient denies: palpitations, racing thoughts. Reported side effects from the treatment: none. Patient is mainly using the Ativan to help sleep at night. Patient has sleep apnea but is not using CPAP machine    Patient has multiple cysts on his skin for which he sees dermatology as well as plastic surgery. He has about a 1 cm cyst in his mid chest that just developed and is red and erythematous. Denies any fever chills. He has an appointment to see plastic surgery in the next week and will discuss with him. In the meantime we will treat with antibiotics for possible infection. Discussed the patient's BMI with him. The BMI follow up plan is as follows: I have counseled this patient on diet and exercise regimens. Current Outpatient Medications   Medication Sig    cephALEXin (KEFLEX) 500 mg capsule Take 1 Cap by mouth three (3) times daily.     LORazepam (ATIVAN) 1 mg tablet Take 1 Tab by mouth nightly as needed for Anxiety. Max Daily Amount: 1 mg.  atorvastatin (LIPITOR) 20 mg tablet Take 1 Tab by mouth every evening.  glipiZIDE SR (GLUCOTROL XL) 10 mg CR tablet Take 1 Tab by mouth daily.  triamterene-hydroCHLOROthiazide (DYAZIDE) 37.5-25 mg per capsule Take 1 Cap by mouth daily.  metFORMIN (GLUCOPHAGE) 500 mg tablet Take 2 Tabs by mouth two (2) times daily (with meals). No current facility-administered medications for this visit.               Review of Systems  Respiratory: negative for dyspnea on exertion  Cardiovascular: negative for chest pain    Objective:     Visit Vitals  BP (!) 155/89 (BP 1 Location: Left upper arm, BP Patient Position: Sitting, BP Cuff Size: Adult)   Pulse 81   Temp 97.6 °F (36.4 °C) (Temporal)   Resp 18   Ht 6' 1\" (1.854 m)   Wt 248 lb (112.5 kg)   SpO2 96%   BMI 32.72 kg/m²        Chest - clear to auscultation, no wheezes, rales or rhonchi, symmetric air entry  Heart - normal rate, regular rhythm, normal S1, S2, no murmurs, rubs, clicks or gallops  Extremities - peripheral pulses normal, no pedal edema, no clubbing or cyanosis  Skin about a 1 cm erythematous cyst in mid chest.  Currently not draining    Labs:   Lab Results   Component Value Date/Time    Cholesterol, total 155 02/17/2021 12:00 AM    HDL Cholesterol 47 02/17/2021 12:00 AM    LDL, calculated 85 02/17/2021 12:00 AM    LDL, calculated 77 06/25/2020 08:26 AM    Triglyceride 130 02/17/2021 12:00 AM    CHOL/HDL Ratio 3.3 02/21/2020 08:20 AM     Lab Results   Component Value Date/Time    Prostate Specific Ag 3.5 07/03/2019 08:47 AM    Prostate Specific Ag 3.4 06/27/2018 07:35 AM    Prostate Specific Ag 2.4 12/09/2016 09:49 AM    Prostate Specific Ag 2.0 01/10/2011    Prostate Specific Ag 2.0 01/18/2010 10:50 AM     Lab Results   Component Value Date/Time    Sodium 143 02/17/2021 12:00 AM    Potassium 4.6 02/17/2021 12:00 AM    Chloride 102 02/17/2021 12:00 AM    CO2 24 02/17/2021 12:00 AM    Anion gap 5 02/21/2020 08:20 AM Glucose 153 (H) 02/17/2021 12:00 AM    BUN 23 02/17/2021 12:00 AM    Creatinine 1.08 02/17/2021 12:00 AM    BUN/Creatinine ratio 21 02/17/2021 12:00 AM    GFR est AA 78 02/17/2021 12:00 AM    GFR est non-AA 67 02/17/2021 12:00 AM    Calcium 9.6 02/17/2021 12:00 AM    Bilirubin, total 0.4 02/17/2021 12:00 AM    ALT (SGPT) 16 02/17/2021 12:00 AM    Alk. phosphatase 58 02/17/2021 12:00 AM    Protein, total 6.8 02/17/2021 12:00 AM    Albumin 4.3 02/17/2021 12:00 AM    Globulin 3.4 02/21/2020 08:20 AM    A-G Ratio 1.7 02/17/2021 12:00 AM      Lab Results   Component Value Date/Time    Hemoglobin A1c 7.8 (H) 02/17/2021 12:00 AM            Assessment / Plan     Diabetes borderline controlled, on Glucotrol and Metformin. Patient will work on cutting back starches and sweets in his diet to improve  Hypertension uncontrolled, on Dyazide daily. Patient will try improved by losing about 10 pounds. Hyperlipidemia reasonably well controlled, Lipitor 20 mg daily. ICD-10-CM ICD-9-CM    1. Type 2 diabetes mellitus without complication, without long-term current use of insulin (HCC)  E11.9 250.00 MICROALBUMIN, UR, RAND W/ MICROALB/CREAT RATIO      HEMOGLOBIN A1C W/O EAG   2. Hyperlipidemia LDL goal <100  E78.5 272.4 LIPID PANEL   3. Essential hypertension with goal blood pressure less than 140/90  V90 789.0 METABOLIC PANEL, COMPREHENSIVE   4. Infected cyst of skin  L72.9 706.2 cephALEXin (KEFLEX) 500 mg capsule    L08.9              Diabetic issues reviewed with him: diabetic diet discussed in detail, and low cholesterol diet, weight control and daily exercise discussed. Follow-up and Dispositions    · Return in about 4 months (around 6/24/2021) for labs 1 week before. Reviewed plan of care. Patient has provided input and agrees with goals.

## 2021-02-24 NOTE — PATIENT INSTRUCTIONS
High Blood Pressure: Care Instructions  Overview     It's normal for blood pressure to go up and down throughout the day. But if it stays up, you have high blood pressure. Another name for high blood pressure is hypertension. Despite what a lot of people think, high blood pressure usually doesn't cause headaches or make you feel dizzy or lightheaded. It usually has no symptoms. But it does increase your risk of stroke, heart attack, and other problems. You and your doctor will talk about your risks of these problems based on your blood pressure. Your doctor will give you a goal for your blood pressure. Your goal will be based on your health and your age. Lifestyle changes, such as eating healthy and being active, are always important to help lower blood pressure. You might also take medicine to reach your blood pressure goal.  Follow-up care is a key part of your treatment and safety. Be sure to make and go to all appointments, and call your doctor if you are having problems. It's also a good idea to know your test results and keep a list of the medicines you take. How can you care for yourself at home? Medical treatment  · If you stop taking your medicine, your blood pressure will go back up. You may take one or more types of medicine to lower your blood pressure. Be safe with medicines. Take your medicine exactly as prescribed. Call your doctor if you think you are having a problem with your medicine. · Talk to your doctor before you start taking aspirin every day. Aspirin can help certain people lower their risk of a heart attack or stroke. But taking aspirin isn't right for everyone, because it can cause serious bleeding. · See your doctor regularly. You may need to see the doctor more often at first or until your blood pressure comes down. · If you are taking blood pressure medicine, talk to your doctor before you take decongestants or anti-inflammatory medicine, such as ibuprofen.  Some of these medicines can raise blood pressure. · Learn how to check your blood pressure at home. Lifestyle changes  · Stay at a healthy weight. This is especially important if you put on weight around the waist. Losing even 10 pounds can help you lower your blood pressure. · If your doctor recommends it, get more exercise. Walking is a good choice. Bit by bit, increase the amount you walk every day. Try for at least 30 minutes on most days of the week. You also may want to swim, bike, or do other activities. · Avoid or limit alcohol. Talk to your doctor about whether you can drink any alcohol. · Try to limit how much sodium you eat to less than 2,300 milligrams (mg) a day. Your doctor may ask you to try to eat less than 1,500 mg a day. · Eat plenty of fruits (such as bananas and oranges), vegetables, legumes, whole grains, and low-fat dairy products. · Lower the amount of saturated fat in your diet. Saturated fat is found in animal products such as milk, cheese, and meat. Limiting these foods may help you lose weight and also lower your risk for heart disease. · Do not smoke. Smoking increases your risk for heart attack and stroke. If you need help quitting, talk to your doctor about stop-smoking programs and medicines. These can increase your chances of quitting for good. When should you call for help? Call  911 anytime you think you may need emergency care. This may mean having symptoms that suggest that your blood pressure is causing a serious heart or blood vessel problem. Your blood pressure may be over 180/120. For example, call 911 if:    · You have symptoms of a heart attack. These may include:  ? Chest pain or pressure, or a strange feeling in the chest.  ? Sweating. ? Shortness of breath. ? Nausea or vomiting. ? Pain, pressure, or a strange feeling in the back, neck, jaw, or upper belly or in one or both shoulders or arms. ? Lightheadedness or sudden weakness.   ? A fast or irregular heartbeat.     · You have symptoms of a stroke. These may include:  ? Sudden numbness, tingling, weakness, or loss of movement in your face, arm, or leg, especially on only one side of your body. ? Sudden vision changes. ? Sudden trouble speaking. ? Sudden confusion or trouble understanding simple statements. ? Sudden problems with walking or balance. ? A sudden, severe headache that is different from past headaches.     · You have severe back or belly pain. Do not wait until your blood pressure comes down on its own. Get help right away. Call your doctor now or seek immediate care if:    · Your blood pressure is much higher than normal (such as 180/120 or higher), but you don't have symptoms.     · You think high blood pressure is causing symptoms, such as:  ? Severe headache.  ? Blurry vision. Watch closely for changes in your health, and be sure to contact your doctor if:    · Your blood pressure measures higher than your doctor recommends at least 2 times. That means the top number is higher or the bottom number is higher, or both.     · You think you may be having side effects from your blood pressure medicine. Where can you learn more? Go to http://www.gray.com/  Enter [id number] in the search box to learn more about \"High Blood Pressure: Care Instructions. \"  Current as of: December 16, 2019               Content Version: 12.6  © 9483-9496 [employer]. Care instructions adapted under license by [employer] (which disclaims liability or warranty for this information). If you have questions about a medical condition or this instruction, always ask your healthcare professional. [place] [address] any warranty or liability for your use of this information.

## 2021-02-24 NOTE — PROGRESS NOTES
Patient is in the office today for a 4 month follow up. 1. Have you been to the ER, urgent care clinic since your last visit? Hospitalized since your last visit? No    2. Have you seen or consulted any other health care providers outside of the [address] [place] [address] since your last visit? Include any pap smears or colon screening. yes, [facility]. Magdalena Drake.

## 2021-06-04 RX ORDER — METFORMIN HYDROCHLORIDE 500 MG/1
1000 TABLET ORAL 2 TIMES DAILY WITH MEALS
Qty: 360 TABLET | Refills: 3 | Status: SHIPPED | OUTPATIENT
Start: 2021-06-04 | End: 2022-06-05

## 2021-06-04 NOTE — TELEPHONE ENCOUNTER
Last Visit: 2/24/21 with MD Duvall  Next Appointment: 6/23/21 with MD Duvall  Previous Refill Encounter(s): 6/17/20 #360 with 3 refills    Requested Prescriptions     Pending Prescriptions Disp Refills    metFORMIN (GLUCOPHAGE) 500 mg tablet 360 Tablet 3     Sig: Take 2 Tablets by mouth two (2) times daily (with meals).

## 2021-06-17 ENCOUNTER — APPOINTMENT (OUTPATIENT)
Dept: INTERNAL MEDICINE CLINIC | Age: 76
End: 2021-06-17

## 2021-06-17 DIAGNOSIS — I10 ESSENTIAL HYPERTENSION WITH GOAL BLOOD PRESSURE LESS THAN 140/90: ICD-10-CM

## 2021-06-17 DIAGNOSIS — E78.5 HYPERLIPIDEMIA LDL GOAL <100: ICD-10-CM

## 2021-06-17 DIAGNOSIS — E11.9 TYPE 2 DIABETES MELLITUS WITHOUT COMPLICATION, WITHOUT LONG-TERM CURRENT USE OF INSULIN (HCC): ICD-10-CM

## 2021-06-18 LAB
ALBUMIN SERPL-MCNC: 4.3 G/DL (ref 3.7–4.7)
ALBUMIN/CREAT UR: 19 MG/G CREAT (ref 0–29)
ALBUMIN/GLOB SERPL: 1.7 {RATIO} (ref 1.2–2.2)
ALP SERPL-CCNC: 58 IU/L (ref 48–121)
ALT SERPL-CCNC: 13 IU/L (ref 0–44)
AST SERPL-CCNC: 15 IU/L (ref 0–40)
BILIRUB SERPL-MCNC: 0.3 MG/DL (ref 0–1.2)
BUN SERPL-MCNC: 21 MG/DL (ref 8–27)
BUN/CREAT SERPL: 24 (ref 10–24)
CALCIUM SERPL-MCNC: 9.5 MG/DL (ref 8.6–10.2)
CHLORIDE SERPL-SCNC: 102 MMOL/L (ref 96–106)
CHOLEST SERPL-MCNC: 159 MG/DL (ref 100–199)
CO2 SERPL-SCNC: 25 MMOL/L (ref 20–29)
CREAT SERPL-MCNC: 0.86 MG/DL (ref 0.76–1.27)
CREAT UR-MCNC: 141.2 MG/DL
GLOBULIN SER CALC-MCNC: 2.5 G/DL (ref 1.5–4.5)
GLUCOSE SERPL-MCNC: 147 MG/DL (ref 65–99)
HBA1C MFR BLD: 7.9 % (ref 4.8–5.6)
HDLC SERPL-MCNC: 45 MG/DL
IMP & REVIEW OF LAB RESULTS: NORMAL
LDLC SERPL CALC-MCNC: 96 MG/DL (ref 0–99)
MICROALBUMIN UR-MCNC: 26.7 UG/ML
POTASSIUM SERPL-SCNC: 4.6 MMOL/L (ref 3.5–5.2)
PROT SERPL-MCNC: 6.8 G/DL (ref 6–8.5)
SODIUM SERPL-SCNC: 141 MMOL/L (ref 134–144)
TRIGL SERPL-MCNC: 95 MG/DL (ref 0–149)
VLDLC SERPL CALC-MCNC: 18 MG/DL (ref 5–40)

## 2021-06-23 ENCOUNTER — OFFICE VISIT (OUTPATIENT)
Dept: INTERNAL MEDICINE CLINIC | Age: 76
End: 2021-06-23
Payer: MEDICARE

## 2021-06-23 VITALS
OXYGEN SATURATION: 96 % | RESPIRATION RATE: 18 BRPM | DIASTOLIC BLOOD PRESSURE: 88 MMHG | BODY MASS INDEX: 33 KG/M2 | TEMPERATURE: 97.6 F | SYSTOLIC BLOOD PRESSURE: 148 MMHG | HEART RATE: 73 BPM | WEIGHT: 249 LBS | HEIGHT: 73 IN

## 2021-06-23 DIAGNOSIS — M25.511 CHRONIC PAIN OF BOTH SHOULDERS: ICD-10-CM

## 2021-06-23 DIAGNOSIS — M25.512 CHRONIC PAIN OF BOTH SHOULDERS: ICD-10-CM

## 2021-06-23 DIAGNOSIS — E78.5 HYPERLIPIDEMIA LDL GOAL <100: ICD-10-CM

## 2021-06-23 DIAGNOSIS — G89.29 CHRONIC PAIN OF BOTH SHOULDERS: ICD-10-CM

## 2021-06-23 DIAGNOSIS — E11.9 TYPE 2 DIABETES MELLITUS WITHOUT COMPLICATION, WITHOUT LONG-TERM CURRENT USE OF INSULIN (HCC): Primary | ICD-10-CM

## 2021-06-23 DIAGNOSIS — I10 ESSENTIAL HYPERTENSION WITH GOAL BLOOD PRESSURE LESS THAN 140/90: ICD-10-CM

## 2021-06-23 DIAGNOSIS — G47.33 OBSTRUCTIVE SLEEP APNEA SYNDROME: ICD-10-CM

## 2021-06-23 PROCEDURE — 3017F COLORECTAL CA SCREEN DOC REV: CPT | Performed by: INTERNAL MEDICINE

## 2021-06-23 PROCEDURE — G8427 DOCREV CUR MEDS BY ELIG CLIN: HCPCS | Performed by: INTERNAL MEDICINE

## 2021-06-23 PROCEDURE — G8753 SYS BP > OR = 140: HCPCS | Performed by: INTERNAL MEDICINE

## 2021-06-23 PROCEDURE — G8754 DIAS BP LESS 90: HCPCS | Performed by: INTERNAL MEDICINE

## 2021-06-23 PROCEDURE — 3051F HG A1C>EQUAL 7.0%<8.0%: CPT | Performed by: INTERNAL MEDICINE

## 2021-06-23 PROCEDURE — G8510 SCR DEP NEG, NO PLAN REQD: HCPCS | Performed by: INTERNAL MEDICINE

## 2021-06-23 PROCEDURE — G8417 CALC BMI ABV UP PARAM F/U: HCPCS | Performed by: INTERNAL MEDICINE

## 2021-06-23 PROCEDURE — 1101F PT FALLS ASSESS-DOCD LE1/YR: CPT | Performed by: INTERNAL MEDICINE

## 2021-06-23 PROCEDURE — 2022F DILAT RTA XM EVC RTNOPTHY: CPT | Performed by: INTERNAL MEDICINE

## 2021-06-23 PROCEDURE — 99214 OFFICE O/P EST MOD 30 MIN: CPT | Performed by: INTERNAL MEDICINE

## 2021-06-23 PROCEDURE — G8536 NO DOC ELDER MAL SCRN: HCPCS | Performed by: INTERNAL MEDICINE

## 2021-06-23 RX ORDER — ATORVASTATIN CALCIUM 20 MG/1
TABLET, FILM COATED ORAL
Qty: 90 TABLET | Refills: 3 | Status: SHIPPED | OUTPATIENT
Start: 2021-06-23 | End: 2022-07-11

## 2021-06-23 RX ORDER — GLIPIZIDE 10 MG/1
10 TABLET, FILM COATED, EXTENDED RELEASE ORAL DAILY
Qty: 90 TABLET | Refills: 3 | Status: SHIPPED | OUTPATIENT
Start: 2021-06-23 | End: 2022-06-19

## 2021-06-23 RX ORDER — TRIAMTERENE AND HYDROCHLOROTHIAZIDE 37.5; 25 MG/1; MG/1
1 CAPSULE ORAL DAILY
Qty: 90 CAPSULE | Refills: 3 | Status: SHIPPED | OUTPATIENT
Start: 2021-06-23 | End: 2022-06-19

## 2021-06-23 NOTE — PROGRESS NOTES
Patient is in the office today for a 4 month follow up. Do you have an Advance Directive no  Do you want more information : information given     1. Have you been to the ER, urgent care clinic since your last visit? Hospitalized since your last visit? No    2. Have you seen or consulted any other health care providers outside of the [address] [place] [address] since your last visit? Include any pap smears or colon screening. Yes, [facility].

## 2021-06-23 NOTE — PATIENT INSTRUCTIONS
High Blood Pressure: Care Instructions  Overview     It's normal for blood pressure to go up and down throughout the day. But if it stays up, you have high blood pressure. Another name for high blood pressure is hypertension. Despite what a lot of people think, high blood pressure usually doesn't cause headaches or make you feel dizzy or lightheaded. It usually has no symptoms. But it does increase your risk of stroke, heart attack, and other problems. You and your doctor will talk about your risks of these problems based on your blood pressure. Your doctor will give you a goal for your blood pressure. Your goal will be based on your health and your age. Lifestyle changes, such as eating healthy and being active, are always important to help lower blood pressure. You might also take medicine to reach your blood pressure goal.  Follow-up care is a key part of your treatment and safety. Be sure to make and go to all appointments, and call your doctor if you are having problems. It's also a good idea to know your test results and keep a list of the medicines you take. How can you care for yourself at home? Medical treatment  · If you stop taking your medicine, your blood pressure will go back up. You may take one or more types of medicine to lower your blood pressure. Be safe with medicines. Take your medicine exactly as prescribed. Call your doctor if you think you are having a problem with your medicine. · Talk to your doctor before you start taking aspirin every day. Aspirin can help certain people lower their risk of a heart attack or stroke. But taking aspirin isn't right for everyone, because it can cause serious bleeding. · See your doctor regularly. You may need to see the doctor more often at first or until your blood pressure comes down. · If you are taking blood pressure medicine, talk to your doctor before you take decongestants or anti-inflammatory medicine, such as ibuprofen.  Some of these medicines can raise blood pressure. · Learn how to check your blood pressure at home. Lifestyle changes  · Stay at a healthy weight. This is especially important if you put on weight around the waist. Losing even 10 pounds can help you lower your blood pressure. · If your doctor recommends it, get more exercise. Walking is a good choice. Bit by bit, increase the amount you walk every day. Try for at least 30 minutes on most days of the week. You also may want to swim, bike, or do other activities. · Avoid or limit alcohol. Talk to your doctor about whether you can drink any alcohol. · Try to limit how much sodium you eat to less than 2,300 milligrams (mg) a day. Your doctor may ask you to try to eat less than 1,500 mg a day. · Eat plenty of fruits (such as bananas and oranges), vegetables, legumes, whole grains, and low-fat dairy products. · Lower the amount of saturated fat in your diet. Saturated fat is found in animal products such as milk, cheese, and meat. Limiting these foods may help you lose weight and also lower your risk for heart disease. · Do not smoke. Smoking increases your risk for heart attack and stroke. If you need help quitting, talk to your doctor about stop-smoking programs and medicines. These can increase your chances of quitting for good. When should you call for help? Call  911 anytime you think you may need emergency care. This may mean having symptoms that suggest that your blood pressure is causing a serious heart or blood vessel problem. Your blood pressure may be over 180/120. For example, call 911 if:    · You have symptoms of a heart attack. These may include:  ? Chest pain or pressure, or a strange feeling in the chest.  ? Sweating. ? Shortness of breath. ? Nausea or vomiting. ? Pain, pressure, or a strange feeling in the back, neck, jaw, or upper belly or in one or both shoulders or arms. ? Lightheadedness or sudden weakness.   ? A fast or irregular heartbeat.     · You have symptoms of a stroke. These may include:  ? Sudden numbness, tingling, weakness, or loss of movement in your face, arm, or leg, especially on only one side of your body. ? Sudden vision changes. ? Sudden trouble speaking. ? Sudden confusion or trouble understanding simple statements. ? Sudden problems with walking or balance. ? A sudden, severe headache that is different from past headaches.     · You have severe back or belly pain. Do not wait until your blood pressure comes down on its own. Get help right away. Call your doctor now or seek immediate care if:    · Your blood pressure is much higher than normal (such as 180/120 or higher), but you don't have symptoms.     · You think high blood pressure is causing symptoms, such as:  ? Severe headache.  ? Blurry vision. Watch closely for changes in your health, and be sure to contact your doctor if:    · Your blood pressure measures higher than your doctor recommends at least 2 times. That means the top number is higher or the bottom number is higher, or both.     · You think you may be having side effects from your blood pressure medicine. Where can you learn more? Go to http://www.gray.com/  Enter [id number] in the search box to learn more about \"High Blood Pressure: Care Instructions. \"  Current as of: August 31, 2020               Content Version: 12.8  © 2006-2021 [employer]. Care instructions adapted under license by [employer] (which disclaims liability or warranty for this information). If you have questions about a medical condition or this instruction, always ask your healthcare professional. [place] [address] any warranty or liability for your use of this information.

## 2021-06-23 NOTE — PROGRESS NOTES
Subjective:       Chief Complaint  The patient presents for follow up of diabetes, hypertension and high cholesterol. RANJANA Keenan is a 77 y.o. male seen for follow up of diabetes. Luisana has hypertension and hyperlipidemia. Diabetes no significant medication side effects noted, borderline controlled, on Glucotrol Xl 10 mg and metformin 1 gm BID, hypertension uncontrolled, no significant medication side effects noted, on Dyazide, hyperlipidemia reasonably well controlled, no significant medication side effects noted, on Lipitor 20 mg. Diet and Lifestyle: generally follows a low fat low cholesterol diet, follows a diabetic diet regularly, exercises sporadically    Home BP Monitoring: is not measured at home. Diabetic Review of Systems - home glucose monitoring: is not performed. Other symptoms and concerns: Anxiety Review:  Patient is seen for anxiety disorder. Current treatment includes Ativan and no other therapies. Ongoing symptoms include: none. Patient denies: palpitations, racing thoughts. Reported side effects from the treatment: none. Patient is mainly using the Ativan to help sleep at night. Patient has sleep apnea but is not using CPAP machine    Patient has multiple cysts on his skin for which he sees dermatology as well as plastic surgery. Patient has bilateral shoulder pain for which he had some mild improvement with physical therapy but still continues to have problems. Will refer to orthopedic in the future when he is ready. Discussed the patient's BMI with him. The BMI follow up plan is as follows: I have counseled this patient on diet and exercise regimens. Current Outpatient Medications   Medication Sig    triamterene-hydroCHLOROthiazide (DYAZIDE) 37.5-25 mg per capsule Take 1 Capsule by mouth daily.  glipiZIDE SR (GLUCOTROL XL) 10 mg CR tablet Take 1 Tablet by mouth daily.     atorvastatin (LIPITOR) 20 mg tablet TAKE 1 TABLET BY MOUTH ONCE DAILY IN THE EVENING    metFORMIN (GLUCOPHAGE) 500 mg tablet Take 2 Tablets by mouth two (2) times daily (with meals).  LORazepam (ATIVAN) 1 mg tablet TAKE 1 TABLET BY MOUTH NIGHTLY AS NEEDED FOR ANXIETY . DO NOT EXCEED 1 PER 24 HOURS    cephALEXin (KEFLEX) 500 mg capsule Take 1 Cap by mouth three (3) times daily. (Patient not taking: Reported on 6/23/2021)     No current facility-administered medications for this visit.              Review of Systems  Respiratory: negative for dyspnea on exertion  Cardiovascular: negative for chest pain    Objective:     Visit Vitals  BP (!) 148/88 (BP 1 Location: Left arm, BP Patient Position: Sitting, BP Cuff Size: Adult)   Pulse 73   Temp 97.6 °F (36.4 °C) (Temporal)   Resp 18   Ht 6' 1\" (1.854 m)   Wt 249 lb (112.9 kg)   SpO2 96%   BMI 32.85 kg/m²        Chest - clear to auscultation, no wheezes, rales or rhonchi, symmetric air entry  Heart - normal rate, regular rhythm, normal S1, S2, no murmurs, rubs, clicks or gallops  Extremities - peripheral pulses normal, no pedal edema, no clubbing or cyanosis      Labs:   Lab Results   Component Value Date/Time    Cholesterol, total 159 06/17/2021 08:41 AM    HDL Cholesterol 45 06/17/2021 08:41 AM    LDL, calculated 96 06/17/2021 08:41 AM    LDL, calculated 77 06/25/2020 08:26 AM    Triglyceride 95 06/17/2021 08:41 AM    CHOL/HDL Ratio 3.3 02/21/2020 08:20 AM     Lab Results   Component Value Date/Time    Prostate Specific Ag 3.5 07/03/2019 08:47 AM    Prostate Specific Ag 3.4 06/27/2018 07:35 AM    Prostate Specific Ag 2.4 12/09/2016 09:49 AM    Prostate Specific Ag 2.0 01/10/2011 12:00 AM    Prostate Specific Ag 2.0 01/18/2010 10:50 AM     Lab Results   Component Value Date/Time    Sodium 141 06/17/2021 08:41 AM    Potassium 4.6 06/17/2021 08:41 AM    Chloride 102 06/17/2021 08:41 AM    CO2 25 06/17/2021 08:41 AM    Anion gap 5 02/21/2020 08:20 AM    Glucose 147 (H) 06/17/2021 08:41 AM    BUN 21 06/17/2021 08:41 AM    Creatinine 0.86 06/17/2021 08:41 AM    BUN/Creatinine ratio 24 06/17/2021 08:41 AM    GFR est AA 99 06/17/2021 08:41 AM    GFR est non-AA 85 06/17/2021 08:41 AM    Calcium 9.5 06/17/2021 08:41 AM    Bilirubin, total 0.3 06/17/2021 08:41 AM    ALT (SGPT) 13 06/17/2021 08:41 AM    Alk. phosphatase 58 06/17/2021 08:41 AM    Protein, total 6.8 06/17/2021 08:41 AM    Albumin 4.3 06/17/2021 08:41 AM    Globulin 3.4 02/21/2020 08:20 AM    A-G Ratio 1.7 06/17/2021 08:41 AM      Lab Results   Component Value Date/Time    Hemoglobin A1c 7.9 (H) 06/17/2021 08:41 AM            Assessment / Plan     Diabetes borderline controlled, on Glucotrol and Metformin. Patient will work on cutting back starches and sweets in his diet to improve  Hypertension uncontrolled, on Dyazide daily. Patient will try improved by losing about 10 pounds if unable consider adding ARB. Hyperlipidemia reasonably well controlled, Lipitor 20 mg daily. ICD-10-CM ICD-9-CM    1. Type 2 diabetes mellitus without complication, without long-term current use of insulin (HCC)  E11.9 250.00 HEMOGLOBIN A1C W/O EAG   2. Hyperlipidemia LDL goal <100  E78.5 272.4 LIPID PANEL   3. Essential hypertension with goal blood pressure less than 140/90  W57 640.4 METABOLIC PANEL, COMPREHENSIVE   4. Obstructive sleep apnea syndrome  G47.33 327.23 REFERRAL TO NEUROLOGY   5. Chronic pain of both shoulders  M25.511 719.41  will refer patient to orthopedics in the future if patient can continues to have worsening problems with function    G89.29 338.29     M25.512              Diabetic issues reviewed with him: diabetic diet discussed in detail, and low cholesterol diet, weight control and daily exercise discussed. Follow-up and Dispositions    · Return in about 4 months (around 10/23/2021) for OV, and Medicare Wellness Visit, labs 1 week before. Reviewed plan of care. Patient has provided input and agrees with goals.

## 2021-08-03 PROBLEM — E66.9 OBESITY: Status: RESOLVED | Noted: 2021-08-03 | Resolved: 2021-08-03

## 2021-08-19 DIAGNOSIS — F41.9 ANXIETY: ICD-10-CM

## 2021-08-19 RX ORDER — LORAZEPAM 1 MG/1
1 TABLET ORAL
Qty: 30 TABLET | Refills: 5 | Status: SHIPPED | OUTPATIENT
Start: 2021-08-19 | End: 2022-03-11

## 2021-08-19 NOTE — TELEPHONE ENCOUNTER
[facility] as [facility]10/21 for a 30 d/s. Last Visit: 6/23/21 with MD Duvall  Next Appointment: 10/28/21 with MD Duvall    Requested Prescriptions     Pending Prescriptions Disp Refills    LORazepam (ATIVAN) 1 mg tablet 30 Tablet 5     Sig: Take 1 Tablet by mouth nightly as needed for Anxiety. Max Daily Amount: 1 mg.

## 2021-08-19 NOTE — TELEPHONE ENCOUNTER
Requested Prescriptions     Pending Prescriptions Disp Refills    LORazepam (ATIVAN) 1 mg tablet 30 Tablet 5      New RX needed. Per pt he had this RX transferred while out of town, and per the pharmacy only one transfer is allowed. The transfer cancelled out the remaining refills.      Also he is going out of town in 2 days, so asking please process as soon as possible

## 2021-10-15 ENCOUNTER — APPOINTMENT (OUTPATIENT)
Dept: INTERNAL MEDICINE CLINIC | Age: 76
End: 2021-10-15

## 2021-10-16 LAB
ALBUMIN SERPL-MCNC: 4.3 G/DL (ref 3.7–4.7)
ALBUMIN/GLOB SERPL: 1.7 {RATIO} (ref 1.2–2.2)
ALP SERPL-CCNC: 63 IU/L (ref 44–121)
ALT SERPL-CCNC: 17 IU/L (ref 0–44)
AST SERPL-CCNC: 16 IU/L (ref 0–40)
BILIRUB SERPL-MCNC: 0.5 MG/DL (ref 0–1.2)
BUN SERPL-MCNC: 20 MG/DL (ref 8–27)
BUN/CREAT SERPL: 19 (ref 10–24)
CALCIUM SERPL-MCNC: 9.4 MG/DL (ref 8.6–10.2)
CHLORIDE SERPL-SCNC: 101 MMOL/L (ref 96–106)
CHOLEST SERPL-MCNC: 146 MG/DL (ref 100–199)
CO2 SERPL-SCNC: 27 MMOL/L (ref 20–29)
CREAT SERPL-MCNC: 1.04 MG/DL (ref 0.76–1.27)
GLOBULIN SER CALC-MCNC: 2.6 G/DL (ref 1.5–4.5)
GLUCOSE SERPL-MCNC: 142 MG/DL (ref 65–99)
HBA1C MFR BLD: 7.8 % (ref 4.8–5.6)
HDLC SERPL-MCNC: 42 MG/DL
IMP & REVIEW OF LAB RESULTS: NORMAL
LDLC SERPL CALC-MCNC: 85 MG/DL (ref 0–99)
POTASSIUM SERPL-SCNC: 4.5 MMOL/L (ref 3.5–5.2)
PROT SERPL-MCNC: 6.9 G/DL (ref 6–8.5)
SODIUM SERPL-SCNC: 143 MMOL/L (ref 134–144)
TRIGL SERPL-MCNC: 103 MG/DL (ref 0–149)
VLDLC SERPL CALC-MCNC: 19 MG/DL (ref 5–40)

## 2021-10-28 ENCOUNTER — VIRTUAL VISIT (OUTPATIENT)
Dept: INTERNAL MEDICINE CLINIC | Age: 76
End: 2021-10-28
Payer: MEDICARE

## 2021-10-28 ENCOUNTER — TELEPHONE (OUTPATIENT)
Dept: INTERNAL MEDICINE CLINIC | Age: 76
End: 2021-10-28

## 2021-10-28 DIAGNOSIS — E78.5 HYPERLIPIDEMIA LDL GOAL <100: ICD-10-CM

## 2021-10-28 DIAGNOSIS — E11.9 TYPE 2 DIABETES MELLITUS WITHOUT COMPLICATION, WITHOUT LONG-TERM CURRENT USE OF INSULIN (HCC): ICD-10-CM

## 2021-10-28 DIAGNOSIS — Z00.00 MEDICARE ANNUAL WELLNESS VISIT, SUBSEQUENT: Primary | ICD-10-CM

## 2021-10-28 DIAGNOSIS — Z12.5 SCREENING PSA (PROSTATE SPECIFIC ANTIGEN): ICD-10-CM

## 2021-10-28 DIAGNOSIS — I10 ESSENTIAL HYPERTENSION WITH GOAL BLOOD PRESSURE LESS THAN 140/90: ICD-10-CM

## 2021-10-28 PROCEDURE — 1101F PT FALLS ASSESS-DOCD LE1/YR: CPT | Performed by: INTERNAL MEDICINE

## 2021-10-28 PROCEDURE — 2022F DILAT RTA XM EVC RTNOPTHY: CPT | Performed by: INTERNAL MEDICINE

## 2021-10-28 PROCEDURE — G8536 NO DOC ELDER MAL SCRN: HCPCS | Performed by: INTERNAL MEDICINE

## 2021-10-28 PROCEDURE — G8427 DOCREV CUR MEDS BY ELIG CLIN: HCPCS | Performed by: INTERNAL MEDICINE

## 2021-10-28 PROCEDURE — G8756 NO BP MEASURE DOC: HCPCS | Performed by: INTERNAL MEDICINE

## 2021-10-28 PROCEDURE — 3017F COLORECTAL CA SCREEN DOC REV: CPT | Performed by: INTERNAL MEDICINE

## 2021-10-28 PROCEDURE — 3051F HG A1C>EQUAL 7.0%<8.0%: CPT | Performed by: INTERNAL MEDICINE

## 2021-10-28 PROCEDURE — 99214 OFFICE O/P EST MOD 30 MIN: CPT | Performed by: INTERNAL MEDICINE

## 2021-10-28 PROCEDURE — G8417 CALC BMI ABV UP PARAM F/U: HCPCS | Performed by: INTERNAL MEDICINE

## 2021-10-28 PROCEDURE — G8432 DEP SCR NOT DOC, RNG: HCPCS | Performed by: INTERNAL MEDICINE

## 2021-10-28 PROCEDURE — G0439 PPPS, SUBSEQ VISIT: HCPCS | Performed by: INTERNAL MEDICINE

## 2021-10-28 NOTE — PROGRESS NOTES
Elo Diego III 77 y.o. male who was seen by synchronous (real-time) audio-video technology on 10/28/21    Consent:  heand/or his healthcare decision maker is aware that this patient-initiated Telehealth encounter is a billable service, with coverage as determined by her insurance carrier. he is aware that he may receive a bill and has provided verbal consent to proceed: Yes I was in my office while conducting this encounter. The patient was in their home. Subjective:       Chief Complaint  The patient presents for follow up of diabetes, hypertension and high cholesterol. RANJANA Grant is a 77 y.o. male seen for follow up of diabetes. Luisana has hypertension and hyperlipidemia. Diabetes no significant medication side effects noted, borderline controlled, on Glucotrol XL 10 mg and metformin 1 gm BID, patient working on weight loss, hypertension uncontrolled even at home, no significant medication side effects noted, on Dyazide, if blood pressure not better by next office visit would add ARB. Hyperlipidemia reasonably well controlled, no significant medication side effects noted, on Lipitor 20 mg. Diet and Lifestyle: generally follows a low fat low cholesterol diet, follows a diabetic diet regularly, exercises sporadically    Home BP Monitoring: is not measured at home. Diabetic Review of Systems - home glucose monitoring: is not performed. Other symptoms and concerns: Anxiety Review:  Patient is seen for anxiety disorder. Current treatment includes Ativan and no other therapies. Ongoing symptoms include: none. Patient denies: palpitations, racing thoughts. Reported side effects from the treatment: none. Patient is mainly using the Ativan to help sleep at night. Patient has sleep apnea and should be getting his CPAP machine soon. Patient has multiple cysts on his skin for which he sees dermatology as well as plastic surgery.      Patient recently diagnosed with Covid 19 and is recovering at home and did not need to go to emergency room. Discussed the patient's BMI with him. The BMI follow up plan is as follows: I have counseled this patient on diet and exercise regimens. Current Outpatient Medications   Medication Sig    LORazepam (ATIVAN) 1 mg tablet Take 1 Tablet by mouth nightly as needed for Anxiety. Max Daily Amount: 1 mg.  triamterene-hydroCHLOROthiazide (DYAZIDE) 37.5-25 mg per capsule Take 1 Capsule by mouth daily.  glipiZIDE SR (GLUCOTROL XL) 10 mg CR tablet Take 1 Tablet by mouth daily.  atorvastatin (LIPITOR) 20 mg tablet TAKE 1 TABLET BY MOUTH ONCE DAILY IN THE EVENING    metFORMIN (GLUCOPHAGE) 500 mg tablet Take 2 Tablets by mouth two (2) times daily (with meals).  cephALEXin (KEFLEX) 500 mg capsule Take 1 Cap by mouth three (3) times daily. (Patient not taking: Reported on 6/23/2021)     No current facility-administered medications for this visit. Review of Systems  Respiratory: negative for dyspnea on exertion  Cardiovascular: negative for chest pain    Objective: There were no vitals taken for this visit.      General appearance - alert, well appearing, and in no distress and oriented to person, place, and time    Labs:   Lab Results   Component Value Date/Time    Cholesterol, total 146 10/15/2021 12:00 AM    HDL Cholesterol 42 10/15/2021 12:00 AM    LDL, calculated 85 10/15/2021 12:00 AM    LDL, calculated 77 06/25/2020 08:26 AM    Triglyceride 103 10/15/2021 12:00 AM    CHOL/HDL Ratio 3.3 02/21/2020 08:20 AM     Lab Results   Component Value Date/Time    Prostate Specific Ag 3.5 07/03/2019 08:47 AM    Prostate Specific Ag 3.4 06/27/2018 07:35 AM    Prostate Specific Ag 2.4 12/09/2016 09:49 AM    Prostate Specific Ag 2.0 01/10/2011 12:00 AM    Prostate Specific Ag 2.0 01/18/2010 10:50 AM     Lab Results   Component Value Date/Time    Sodium 143 10/15/2021 12:00 AM    Potassium 4.5 10/15/2021 12:00 AM    Chloride 101 10/15/2021 12:00 AM    CO2 27 10/15/2021 12:00 AM    Anion gap 5 02/21/2020 08:20 AM    Glucose 142 (H) 10/15/2021 12:00 AM    BUN 20 10/15/2021 12:00 AM    Creatinine 1.04 10/15/2021 12:00 AM    BUN/Creatinine ratio 19 10/15/2021 12:00 AM    GFR est AA 81 10/15/2021 12:00 AM    GFR est non-AA 70 10/15/2021 12:00 AM    Calcium 9.4 10/15/2021 12:00 AM    Bilirubin, total 0.5 10/15/2021 12:00 AM    ALT (SGPT) 17 10/15/2021 12:00 AM    Alk. phosphatase 63 10/15/2021 12:00 AM    Protein, total 6.9 10/15/2021 12:00 AM    Albumin 4.3 10/15/2021 12:00 AM    Globulin 3.4 02/21/2020 08:20 AM    A-G Ratio 1.7 10/15/2021 12:00 AM      Lab Results   Component Value Date/Time    Hemoglobin A1c 7.8 (H) 10/15/2021 12:00 AM            Assessment / Plan     Diabetes borderline controlled, on Glucotrol and Metformin. Patient will work on cutting back starches and sweets in his diet to improve  Hypertension uncontrolled, on Dyazide daily. Patient will try improved by losing about 10 pounds if unable consider adding ARB. Hyperlipidemia reasonably well controlled, Lipitor 20 mg daily. ICD-10-CM ICD-9-CM    1. Medicare annual wellness visit, subsequent  Z00.00 V70.0    2. Type 2 diabetes mellitus without complication, without long-term current use of insulin (HCC)  E11.9 250.00 HEMOGLOBIN A1C W/O EAG   3. Hyperlipidemia LDL goal <100  E78.5 272.4 LIPID PANEL   4. Essential hypertension with goal blood pressure less than 140/90  I54 511.2 METABOLIC PANEL, COMPREHENSIVE   5. Screening PSA (prostate specific antigen)  Z12.5 V76.44 PSA SCREENING (SCREENING)              Diabetic issues reviewed with him: diabetic diet discussed in detail, and low cholesterol diet, weight control and daily exercise discussed. Follow-up and Dispositions    · Return in about 3 months (around 1/28/2022) for labs 1 week before. Reviewed plan of care. Patient has provided input and agrees with goals.

## 2021-10-28 NOTE — PROGRESS NOTES
This is the Subsequent Medicare Annual Wellness Exam, performed 12 months or more after the Initial AWV or the last Subsequent AWV    I have reviewed the patient's medical history in detail and updated the computerized patient record. Assessment/Plan   Education and counseling provided:  Are appropriate based on today's review and evaluation  End-of-Life planning (with patient's consent)    1. Medicare annual wellness visit, subsequent  2. Type 2 diabetes mellitus without complication, without long-term current use of insulin (HCC)  -     HEMOGLOBIN A1C W/O EAG; Future  3. Hyperlipidemia LDL goal <100  -     LIPID PANEL; Future  4. Essential hypertension with goal blood pressure less than 259/61  -     METABOLIC PANEL, COMPREHENSIVE; Future  5. Screening PSA (prostate specific antigen)  -     PSA SCREENING (SCREENING); Future       Depression Risk Factor Screening:     3 most recent PHQ Screens 6/23/2021   Little interest or pleasure in doing things Not at all   Feeling down, depressed, irritable, or hopeless Not at all   Total Score PHQ 2 0       Alcohol Risk Screen    Do you average more than 1 drink per night or more than 7 drinks a week: No    In the past three months have you have had more than 4 drinks containing alcohol on one occasion: No        Functional Ability and Level of Safety:    Hearing: The patient wears hearing aids. Activities of Daily Living: The home contains: no safety equipment. Patient does total self care      Ambulation: with no difficulty     Fall Risk:  Fall Risk Assessment, last 12 mths 6/23/2021   Able to walk? Yes   Fall in past 12 months? 0   Do you feel unsteady? 0   Are you worried about falling 0   Number of falls in past 12 months -   Fall with injury? -      Abuse Screen:  Patient is not abused       Cognitive Screening    Has your family/caregiver stated any concerns about your memory: no    Cognitive Screening: Normal - .     Health Maintenance Due     Health Maintenance Due   Topic Date Due    DTaP/Tdap/Td series (1 - Tdap) Never done    AAA Screening 73-67 YO Male Smoking Patients  Never done    Foot Exam Q1  07/17/2020    Flu Vaccine (1) 09/01/2021    Eye Exam Retinal or Dilated  10/04/2021       Patient Care Team   Patient Care Team:  Simone Yu MD as PCP - General (Internal Medicine)  Sim[facility]  Edgardo Galeana MD (Gastroenterology)  Tin Serrano RN as [facility] (Internal Medicine)  Lesly Hebert DO (Pulmonary Disease)  Bebe Levin DO (Dermatology)  Sabrina Colón MD (Plastic Surgery)  Glaucoma Screening- MYEYEDR  Pneumonia Vaccine-  UTD  Shingles Vaccine- UTD  Tdap Vaccine- not UTD  Colonoscopy-  Dr Connor Hardin 5/2020 f/u in 3 years  PSA- 7/2019 3.5 will check with next blood work after discussion with patient. Advance Directive-  Pt made aware and has information     History     Patient Active Problem List   Diagnosis Code    Hyperlipidemia LDL goal <100 E78.5    Primary hypertension I10    Primary insomnia F51.01    Elevated PSA, less than 10 ng/ml R97.20    Type 2 diabetes mellitus without complication, without long-term current use of insulin (HCC) E11.9    Obstructive sleep apnea syndrome G47.33    Chronic insomnia F51.04    Obesity (BMI 30-39. 9) E66.9     Past Medical History:   Diagnosis Date    Diabetes ([facility] [address]     HLD (hyperlipidemia)     Hypertension     Obesity     Sleep apnea       Past Surgical History:   Procedure Laterality Date    HX COLONOSCOPY  4/30/15    adenomatous polyps, 5 years. Dr. Connor Hardin    HX HEENT      tooth extraction     Current Outpatient Medications   Medication Sig Dispense Refill    LORazepam (ATIVAN) 1 mg tablet Take 1 Tablet by mouth nightly as needed for Anxiety. Max Daily Amount: 1 mg. 30 Tablet 5    triamterene-hydroCHLOROthiazide (DYAZIDE) 37.5-25 mg per capsule Take 1 Capsule by mouth daily.  90 Capsule 3    glipiZIDE SR (GLUCOTROL XL) 10 mg CR tablet Take 1 Tablet by mouth daily. 90 Tablet 3    atorvastatin (LIPITOR) 20 mg tablet TAKE 1 TABLET BY MOUTH ONCE DAILY IN THE EVENING 90 Tablet 3    metFORMIN (GLUCOPHAGE) 500 mg tablet Take 2 Tablets by mouth two (2) times daily (with meals). 360 Tablet 3    cephALEXin (KEFLEX) 500 mg capsule Take 1 Cap by mouth three (3) times daily. (Patient not taking: Reported on 2021) 30 Cap 0     Allergies   Allergen Reactions    Ace Inhibitors Cough       Family History   Problem Relation Age of Onset    Cancer Mother         ovarian cancer    Diabetes Father     Heart Disease Father     Cancer Maternal Grandfather         unknown type    Cancer Sister         \"everywhere\"     Social History     Tobacco Use    Smoking status: Former Smoker     Packs/day: 1.00     Years: 12.00     Pack years: 12.00     Quit date: 1978     Years since quittin.7    Smokeless tobacco: Never Used   Substance Use Topics    Alcohol use: Yes     Alcohol/week: 0.0 standard drinks     Types: 1 - 2 Cans of beer per week       Mariano Mccollum GUANACO, who was evaluated through a synchronous (real-time) audio-video encounter, and/or his healthcare decision maker, is aware that it is a billable service, with coverage as determined by his insurance carrier. He provided verbal consent to proceed: Yes, and patient identification was verified. It was conducted pursuant to the emergency declaration under the [facility], [address] [place] [address] authority and the [employer] Act. A caregiver was present when appropriate. Ability to conduct physical exam was limited. I was in the office. The patient was at home.     Maricarmen Clifton MD

## 2021-10-28 NOTE — PATIENT INSTRUCTIONS
Medicare Wellness Visit, Male    The best way to live healthy is to have a lifestyle where you eat a well-balanced diet, exercise regularly, limit alcohol use, and quit all forms of tobacco/nicotine, if applicable. Regular preventive services are another way to keep healthy. Preventive services (vaccines, screening tests, monitoring & exams) can help personalize your care plan, which helps you manage your own care. Screening tests can find health problems at the earliest stages, when they are easiest to treat. [facility] follows the current, evidence-based guidelines published by the [facility] Jevon Dom ([facility]) when recommending preventive services for our patients. Because we follow these guidelines, sometimes recommendations change over time as research supports it. (For example, a prostate screening blood test is no longer routinely recommended for men with no symptoms). Of course, you and your doctor may decide to screen more often for some diseases, based on your risk and co-morbidities (chronic disease you are already diagnosed with). Preventive services for you include:  - Medicare offers their members a free annual wellness visit, which is time for you and your primary care provider to discuss and plan for your preventive service needs. Take advantage of this benefit every year!  -All adults over age 72 should receive the recommended pneumonia vaccines. Current USPSTF guidelines recommend a series of two vaccines for the best pneumonia protection.   -All adults should have a flu vaccine yearly and tetanus vaccine every 10 years.  -All adults age 48 and older should receive the shingles vaccines (series of two vaccines).        -All adults age 38-68 who are overweight should have a diabetes screening test once every three years.   -Other screening tests & preventive services for persons with diabetes include: an eye exam to screen for diabetic retinopathy, a kidney function test, a foot exam, and stricter control over your cholesterol.   -Cardiovascular screening for adults with routine risk involves an electrocardiogram (ECG) at intervals determined by the provider.   -Colorectal cancer screening should be done for adults age 54-65 with no increased risk factors for colorectal cancer. There are a number of acceptable methods of screening for this type of cancer. Each test has its own benefits and drawbacks. Discuss with your provider what is most appropriate for you during your annual wellness visit. The different tests include: colonoscopy (considered the best screening method), a fecal occult blood test, a fecal DNA test, and sigmoidoscopy.  -All adults born between [facility] should be screened once for Hepatitis C.  -An Abdominal Aortic Aneurysm (AAA) Screening is recommended for men age 73-68 who has ever smoked in their lifetime.      Here is a list of your current Health Maintenance items (your personalized list of preventive services) with a due date:  Health Maintenance Due   Topic Date Due    DTaP/Tdap/Td  (1 - Tdap) Never done    AAA Screening  Never done    Diabetic Foot Care  07/17/2020    Yearly Flu Vaccine (1) 09/01/2021    Eye Exam  10/04/2021

## 2021-10-28 NOTE — TELEPHONE ENCOUNTER
Please schedule next follow up     Return in about 3 months (around 1/28/2022) for labs 1 week before.

## 2022-01-25 ENCOUNTER — APPOINTMENT (OUTPATIENT)
Dept: INTERNAL MEDICINE CLINIC | Age: 77
End: 2022-01-25

## 2022-01-26 LAB
ALBUMIN SERPL-MCNC: 4.3 G/DL (ref 3.7–4.7)
ALBUMIN/GLOB SERPL: 1.6 {RATIO} (ref 1.2–2.2)
ALP SERPL-CCNC: 60 IU/L (ref 44–121)
ALT SERPL-CCNC: 15 IU/L (ref 0–44)
AST SERPL-CCNC: 18 IU/L (ref 0–40)
BILIRUB SERPL-MCNC: 0.5 MG/DL (ref 0–1.2)
BUN SERPL-MCNC: 20 MG/DL (ref 8–27)
BUN/CREAT SERPL: 23 (ref 10–24)
CALCIUM SERPL-MCNC: 9.3 MG/DL (ref 8.6–10.2)
CHLORIDE SERPL-SCNC: 101 MMOL/L (ref 96–106)
CHOLEST SERPL-MCNC: 158 MG/DL (ref 100–199)
CO2 SERPL-SCNC: 22 MMOL/L (ref 20–29)
CREAT SERPL-MCNC: 0.88 MG/DL (ref 0.76–1.27)
GLOBULIN SER CALC-MCNC: 2.7 G/DL (ref 1.5–4.5)
GLUCOSE SERPL-MCNC: 111 MG/DL (ref 65–99)
HBA1C MFR BLD: 7.6 % (ref 4.8–5.6)
HDLC SERPL-MCNC: 44 MG/DL
IMP & REVIEW OF LAB RESULTS: NORMAL
LDLC SERPL CALC-MCNC: 94 MG/DL (ref 0–99)
POTASSIUM SERPL-SCNC: 4.6 MMOL/L (ref 3.5–5.2)
PROT SERPL-MCNC: 7 G/DL (ref 6–8.5)
PSA SERPL-MCNC: 3 NG/ML (ref 0–4)
SODIUM SERPL-SCNC: 141 MMOL/L (ref 134–144)
TRIGL SERPL-MCNC: 110 MG/DL (ref 0–149)
VLDLC SERPL CALC-MCNC: 20 MG/DL (ref 5–40)

## 2022-02-01 ENCOUNTER — OFFICE VISIT (OUTPATIENT)
Dept: INTERNAL MEDICINE CLINIC | Age: 77
End: 2022-02-01
Payer: MEDICARE

## 2022-02-01 VITALS
HEIGHT: 73 IN | WEIGHT: 245 LBS | BODY MASS INDEX: 32.47 KG/M2 | OXYGEN SATURATION: 97 % | SYSTOLIC BLOOD PRESSURE: 124 MMHG | RESPIRATION RATE: 20 BRPM | HEART RATE: 71 BPM | DIASTOLIC BLOOD PRESSURE: 75 MMHG | TEMPERATURE: 97.6 F

## 2022-02-01 DIAGNOSIS — F41.9 ANXIETY: ICD-10-CM

## 2022-02-01 DIAGNOSIS — E78.5 HYPERLIPIDEMIA LDL GOAL <100: ICD-10-CM

## 2022-02-01 DIAGNOSIS — G47.33 OBSTRUCTIVE SLEEP APNEA SYNDROME: ICD-10-CM

## 2022-02-01 DIAGNOSIS — I10 ESSENTIAL HYPERTENSION WITH GOAL BLOOD PRESSURE LESS THAN 140/90: ICD-10-CM

## 2022-02-01 DIAGNOSIS — E11.9 TYPE 2 DIABETES MELLITUS WITHOUT COMPLICATION, WITHOUT LONG-TERM CURRENT USE OF INSULIN (HCC): Primary | ICD-10-CM

## 2022-02-01 PROCEDURE — G8427 DOCREV CUR MEDS BY ELIG CLIN: HCPCS | Performed by: INTERNAL MEDICINE

## 2022-02-01 PROCEDURE — G8754 DIAS BP LESS 90: HCPCS | Performed by: INTERNAL MEDICINE

## 2022-02-01 PROCEDURE — G8417 CALC BMI ABV UP PARAM F/U: HCPCS | Performed by: INTERNAL MEDICINE

## 2022-02-01 PROCEDURE — 3017F COLORECTAL CA SCREEN DOC REV: CPT | Performed by: INTERNAL MEDICINE

## 2022-02-01 PROCEDURE — G8536 NO DOC ELDER MAL SCRN: HCPCS | Performed by: INTERNAL MEDICINE

## 2022-02-01 PROCEDURE — G8510 SCR DEP NEG, NO PLAN REQD: HCPCS | Performed by: INTERNAL MEDICINE

## 2022-02-01 PROCEDURE — 2022F DILAT RTA XM EVC RTNOPTHY: CPT | Performed by: INTERNAL MEDICINE

## 2022-02-01 PROCEDURE — 99214 OFFICE O/P EST MOD 30 MIN: CPT | Performed by: INTERNAL MEDICINE

## 2022-02-01 PROCEDURE — 1101F PT FALLS ASSESS-DOCD LE1/YR: CPT | Performed by: INTERNAL MEDICINE

## 2022-02-01 PROCEDURE — 3051F HG A1C>EQUAL 7.0%<8.0%: CPT | Performed by: INTERNAL MEDICINE

## 2022-02-01 PROCEDURE — G8752 SYS BP LESS 140: HCPCS | Performed by: INTERNAL MEDICINE

## 2022-02-01 NOTE — PROGRESS NOTES
Subjective:       Chief Complaint  The patient presents for follow up of diabetes, hypertension and high cholesterol. HPI  Donovan Truong is a 77 y.o. male seen for follow up of diabetes. Healso has hypertension and hyperlipidemia. Diabetes no significant medication side effects noted, borderline controlled, on Glucotrol Xl 10 mg and metformin 1 gm BID, pt will try to improve with some weight loss,  hypertension well controlled, no significant medication side effects noted, on Dyazide, consider ARB if pt develops proteinuria,   hyperlipidemia reasonably well controlled, no significant medication side effects noted, on Lipitor 20 mg. Will try to improve further with cutting back sugar and starches in diet    Diet and Lifestyle: generally follows a low fat low cholesterol diet, follows a diabetic diet regularly, exercises regularly, by walking    Home BP Monitoring: is not measured at home. Diabetic Review of Systems - home glucose monitoring: is not performed. Other symptoms and concerns: Anxiety Review:  Patient is seen for anxiety disorder. Current treatment includes Ativan and no other therapies. Pt trying to wean off the Ativan and has not had it in a week  Ongoing symptoms include: none. Patient denies: palpitations, racing thoughts. Reported side effects from the treatment: none. Patient is mainly using the Ativan to help sleep at night. Patient has sleep apnea and is back on CPAP machine using it at least 4 hrs/night    Patient has multiple cysts on his skin for which he sees dermatology as well as plastic surgery. Discussed the patient's BMI with him. The BMI follow up plan is as follows: I have counseled this patient on diet and exercise regimens. Current Outpatient Medications   Medication Sig    LORazepam (ATIVAN) 1 mg tablet Take 1 Tablet by mouth nightly as needed for Anxiety. Max Daily Amount: 1 mg.     triamterene-hydroCHLOROthiazide (DYAZIDE) 37.5-25 mg per capsule Take 1 Capsule by mouth daily.  glipiZIDE SR (GLUCOTROL XL) 10 mg CR tablet Take 1 Tablet by mouth daily.  atorvastatin (LIPITOR) 20 mg tablet TAKE 1 TABLET BY MOUTH ONCE DAILY IN THE EVENING    metFORMIN (GLUCOPHAGE) 500 mg tablet Take 2 Tablets by mouth two (2) times daily (with meals). No current facility-administered medications for this visit.              Review of Systems  Respiratory: negative for dyspnea on exertion  Cardiovascular: negative for chest pain    Objective:     Visit Vitals  /75 (BP 1 Location: Left arm, BP Patient Position: Sitting, BP Cuff Size: Adult)   Pulse 71   Temp 97.6 °F (36.4 °C) (Temporal)   Resp 20   Ht 6' 1\" (1.854 m)   Wt 245 lb (111.1 kg)   SpO2 97%   BMI 32.32 kg/m²        Chest - clear to auscultation, no wheezes, rales or rhonchi, symmetric air entry  Heart - normal rate, regular rhythm, normal S1, S2, no murmurs, rubs, clicks or gallops  Extremities - peripheral pulses normal, no pedal edema, no clubbing or cyanosis      Labs:   Lab Results   Component Value Date/Time    Cholesterol, total 158 01/25/2022 12:00 AM    HDL Cholesterol 44 01/25/2022 12:00 AM    LDL, calculated 94 01/25/2022 12:00 AM    LDL, calculated 77 06/25/2020 08:26 AM    Triglyceride 110 01/25/2022 12:00 AM    CHOL/HDL Ratio 3.3 02/21/2020 08:20 AM     Lab Results   Component Value Date/Time    Prostate Specific Ag 3.0 01/25/2022 12:00 AM    Prostate Specific Ag 3.5 07/03/2019 08:47 AM    Prostate Specific Ag 3.4 06/27/2018 07:35 AM    Prostate Specific Ag 2.0 01/10/2011 12:00 AM    Prostate Specific Ag 2.0 01/18/2010 10:50 AM     Lab Results   Component Value Date/Time    Sodium 141 01/25/2022 12:00 AM    Potassium 4.6 01/25/2022 12:00 AM    Chloride 101 01/25/2022 12:00 AM    CO2 22 01/25/2022 12:00 AM    Anion gap 5 02/21/2020 08:20 AM    Glucose 111 (H) 01/25/2022 12:00 AM    BUN 20 01/25/2022 12:00 AM    Creatinine 0.88 01/25/2022 12:00 AM    BUN/Creatinine ratio 23 01/25/2022 12:00 AM    GFR est AA 97 01/25/2022 12:00 AM    GFR est non-AA 84 01/25/2022 12:00 AM    Calcium 9.3 01/25/2022 12:00 AM    Bilirubin, total 0.5 01/25/2022 12:00 AM    ALT (SGPT) 15 01/25/2022 12:00 AM    Alk. phosphatase 60 01/25/2022 12:00 AM    Protein, total 7.0 01/25/2022 12:00 AM    Albumin 4.3 01/25/2022 12:00 AM    Globulin 3.4 02/21/2020 08:20 AM    A-G Ratio 1.6 01/25/2022 12:00 AM      Lab Results   Component Value Date/Time    Hemoglobin A1c 7.6 (H) 01/25/2022 12:00 AM            Assessment / Plan     Diabetes borderline controlled, on Glucotrol and Metformin. Patient will work on cutting back starches and sweets in his diet to improve further. Hypertension well controlled, on Dyazide daily. Hyperlipidemia reasonably well controlled, Lipitor 20 mg daily. Will try to improve further by cutting back starches an sugar in diet. ICD-10-CM ICD-9-CM    1. Type 2 diabetes mellitus without complication, without long-term current use of insulin (HCC)  E11.9 250.00 MICROALBUMIN, UR, RAND W/ MICROALB/CREAT RATIO      HEMOGLOBIN A1C W/O EAG   2. Hyperlipidemia LDL goal <100  E78.5 272.4 LIPID PANEL   3. Essential hypertension with goal blood pressure less than 140/90  R81 242.3 METABOLIC PANEL, COMPREHENSIVE   4. Anxiety  F41.9 300.00 Controlled on Ativan prn   5. Obstructive sleep apnea syndrome  G47.33 327.23 Pt doing fairly well on CPAP            Diabetic issues reviewed with him: diabetic diet discussed in detail, and low cholesterol diet, weight control and daily exercise discussed. Follow-up and Dispositions    · Return in about 6 months (around 8/1/2022) for , and Medicare Wellness Visit. Reviewed plan of care. Patient has provided input and agrees with goals.

## 2022-02-01 NOTE — PATIENT INSTRUCTIONS
High Blood Pressure: Care Instructions  Overview     It's normal for blood pressure to go up and down throughout the day. But if it stays up, you have high blood pressure. Another name for high blood pressure is hypertension. Despite what a lot of people think, high blood pressure usually doesn't cause headaches or make you feel dizzy or lightheaded. It usually has no symptoms. But it does increase your risk of stroke, heart attack, and other problems. You and your doctor will talk about your risks of these problems based on your blood pressure. Your doctor will give you a goal for your blood pressure. Your goal will be based on your health and your age. Lifestyle changes, such as eating healthy and being active, are always important to help lower blood pressure. You might also take medicine to reach your blood pressure goal.  Follow-up care is a key part of your treatment and safety. Be sure to make and go to all appointments, and call your doctor if you are having problems. It's also a good idea to know your test results and keep a list of the medicines you take. How can you care for yourself at home? Medical treatment  · If you stop taking your medicine, your blood pressure will go back up. You may take one or more types of medicine to lower your blood pressure. Be safe with medicines. Take your medicine exactly as prescribed. Call your doctor if you think you are having a problem with your medicine. · Talk to your doctor before you start taking aspirin every day. Aspirin can help certain people lower their risk of a heart attack or stroke. But taking aspirin isn't right for everyone, because it can cause serious bleeding. · See your doctor regularly. You may need to see the doctor more often at first or until your blood pressure comes down. · If you are taking blood pressure medicine, talk to your doctor before you take decongestants or anti-inflammatory medicine, such as ibuprofen.  Some of these medicines can raise blood pressure. · Learn how to check your blood pressure at home. Lifestyle changes  · Stay at a healthy weight. This is especially important if you put on weight around the waist. Losing even 10 pounds can help you lower your blood pressure. · If your doctor recommends it, get more exercise. Walking is a good choice. Bit by bit, increase the amount you walk every day. Try for at least 30 minutes on most days of the week. You also may want to swim, bike, or do other activities. · Avoid or limit alcohol. Talk to your doctor about whether you can drink any alcohol. · Try to limit how much sodium you eat to less than 2,300 milligrams (mg) a day. Your doctor may ask you to try to eat less than 1,500 mg a day. · Eat plenty of fruits (such as bananas and oranges), vegetables, legumes, whole grains, and low-fat dairy products. · Lower the amount of saturated fat in your diet. Saturated fat is found in animal products such as milk, cheese, and meat. Limiting these foods may help you lose weight and also lower your risk for heart disease. · Do not smoke. Smoking increases your risk for heart attack and stroke. If you need help quitting, talk to your doctor about stop-smoking programs and medicines. These can increase your chances of quitting for good. When should you call for help? Call 911  anytime you think you may need emergency care. This may mean having symptoms that suggest that your blood pressure is causing a serious heart or blood vessel problem. Your blood pressure may be over 180/120. For example, call 911 if:    · You have symptoms of a heart attack. These may include:  ? Chest pain or pressure, or a strange feeling in the chest.  ? Sweating. ? Shortness of breath. ? Nausea or vomiting. ? Pain, pressure, or a strange feeling in the back, neck, jaw, or upper belly or in one or both shoulders or arms. ? Lightheadedness or sudden weakness.   ? A fast or irregular heartbeat.     · You have symptoms of a stroke. These may include:  ? Sudden numbness, tingling, weakness, or loss of movement in your face, arm, or leg, especially on only one side of your body. ? Sudden vision changes. ? Sudden trouble speaking. ? Sudden confusion or trouble understanding simple statements. ? Sudden problems with walking or balance. ? A sudden, severe headache that is different from past headaches.     · You have severe back or belly pain. Do not wait until your blood pressure comes down on its own. Get help right away. Call your doctor now or seek immediate care if:    · Your blood pressure is much higher than normal (such as 180/120 or higher), but you don't have symptoms.     · You think high blood pressure is causing symptoms, such as:  ? Severe headache.  ? Blurry vision. Watch closely for changes in your health, and be sure to contact your doctor if:    · Your blood pressure measures higher than your doctor recommends at least 2 times. That means the top number is higher or the bottom number is higher, or both.     · You think you may be having side effects from your blood pressure medicine. Where can you learn more? Go to http://www.gray.com/  Enter [id number] in the search box to learn more about \"High Blood Pressure: Care Instructions. \"  Current as of: April 29, 2021               Content Version: 13.0  © 2006-2021 [employer]. Care instructions adapted under license by [employer] (which disclaims liability or warranty for this information). If you have questions about a medical condition or this instruction, always ask your healthcare professional. [place] [address] any warranty or liability for your use of this information.

## 2022-02-01 NOTE — PROGRESS NOTES
Patient is in the office today for a 3 month follow up. Do you have an Advance Directive no  Do you want more information : information given     1. \"Have you been to the ER, urgent care clinic since your last visit? Hospitalized since your last visit? \" No    2. \"Have you seen or consulted any other health care providers outside of the [address] [place] [address] since your last visit? \" yes, Dr. Sadaf Mei at [facility]      3. For patients aged 39-70: Has the patient had a colonoscopy / FIT/ Cologuard? NA - based on age      If the patient is female:    4. For patients aged 41-77: Has the patient had a mammogram within the past 2 years? NA - based on age or sex      11. For patients aged 21-65: Has the patient had a pap smear?  NA - based on age or sex

## 2022-02-04 ENCOUNTER — TELEPHONE (OUTPATIENT)
Dept: INTERNAL MEDICINE CLINIC | Age: 77
End: 2022-02-04

## 2022-02-04 DIAGNOSIS — L98.9 FACIAL SKIN LESION: Primary | ICD-10-CM

## 2022-03-16 ENCOUNTER — TELEPHONE (OUTPATIENT)
Dept: INTERNAL MEDICINE CLINIC | Age: 77
End: 2022-03-16

## 2022-03-16 DIAGNOSIS — E04.9 GOITER: Primary | ICD-10-CM

## 2022-03-16 NOTE — TELEPHONE ENCOUNTER
Let pt know that [address] said thyroid might be enlarged so US of thyroid was ordered to evaluate it.

## 2022-03-19 PROBLEM — F51.04 CHRONIC INSOMNIA: Status: ACTIVE | Noted: 2017-12-27

## 2022-03-19 PROBLEM — E66.9 OBESITY (BMI 30-39.9): Status: ACTIVE | Noted: 2019-07-17

## 2022-03-23 DIAGNOSIS — E04.9 GOITER: ICD-10-CM

## 2022-04-18 ENCOUNTER — OFFICE VISIT (OUTPATIENT)
Dept: INTERNAL MEDICINE CLINIC | Age: 77
End: 2022-04-18
Payer: MEDICARE

## 2022-04-18 VITALS
OXYGEN SATURATION: 97 % | BODY MASS INDEX: 32.47 KG/M2 | SYSTOLIC BLOOD PRESSURE: 120 MMHG | HEIGHT: 73 IN | WEIGHT: 245 LBS | RESPIRATION RATE: 20 BRPM | TEMPERATURE: 97.8 F | DIASTOLIC BLOOD PRESSURE: 74 MMHG | HEART RATE: 79 BPM

## 2022-04-18 DIAGNOSIS — L72.0 INFECTED EPIDERMOID CYST: Primary | ICD-10-CM

## 2022-04-18 DIAGNOSIS — L08.9 INFECTED EPIDERMOID CYST: Primary | ICD-10-CM

## 2022-04-18 PROCEDURE — G8536 NO DOC ELDER MAL SCRN: HCPCS | Performed by: INTERNAL MEDICINE

## 2022-04-18 PROCEDURE — G8754 DIAS BP LESS 90: HCPCS | Performed by: INTERNAL MEDICINE

## 2022-04-18 PROCEDURE — 99214 OFFICE O/P EST MOD 30 MIN: CPT | Performed by: INTERNAL MEDICINE

## 2022-04-18 PROCEDURE — G8752 SYS BP LESS 140: HCPCS | Performed by: INTERNAL MEDICINE

## 2022-04-18 PROCEDURE — G8427 DOCREV CUR MEDS BY ELIG CLIN: HCPCS | Performed by: INTERNAL MEDICINE

## 2022-04-18 PROCEDURE — G8417 CALC BMI ABV UP PARAM F/U: HCPCS | Performed by: INTERNAL MEDICINE

## 2022-04-18 PROCEDURE — G8510 SCR DEP NEG, NO PLAN REQD: HCPCS | Performed by: INTERNAL MEDICINE

## 2022-04-18 PROCEDURE — 1101F PT FALLS ASSESS-DOCD LE1/YR: CPT | Performed by: INTERNAL MEDICINE

## 2022-04-18 PROCEDURE — 3017F COLORECTAL CA SCREEN DOC REV: CPT | Performed by: INTERNAL MEDICINE

## 2022-04-18 NOTE — PROGRESS NOTES
Patient is in the office today for a rash on the back. 1. \"Have you been to the ER, urgent care clinic since your last visit? Hospitalized since your last visit? \" No    2. \"Have you seen or consulted any other health care providers outside of the [address] [place] [address] since your last visit? \" No     3. For patients aged 39-70: Has the patient had a colonoscopy / FIT/ Cologuard? NA - based on age      If the patient is female:    4. For patients aged 41-77: Has the patient had a mammogram within the past 2 years? NA - based on age or sex      11. For patients aged 21-65: Has the patient had a pap smear?  NA - based on age or sex

## 2022-04-21 ENCOUNTER — HOSPITAL ENCOUNTER (OUTPATIENT)
Dept: ULTRASOUND IMAGING | Age: 77
Discharge: HOME | End: 2022-04-21
Attending: INTERNAL MEDICINE
Payer: MEDICARE

## 2022-04-21 PROCEDURE — 76536 US EXAM OF HEAD AND NECK: CPT

## 2022-04-21 NOTE — PROGRESS NOTES
Leonarda Bermudez is a 77 y.o.  male and presents with Cyst (epidermoid ON BACK )      SUBJECTIVE:    Patient has cyst on his left upper back which over the last 2 weeks became red and began to grow in size and become painful. He has had some mild drainage from the cyst with using warm compresses and he was started on clindamycin a few days ago for superimposed infection. Patient has a follow-up in the next week or so with dermatology for possible excision of the cyst if possible. He denies any fever any chills currently and the pain and swelling is slowly improving. Patient knows to use a probiotic while taking clindamycin. Respiratory ROS: negative for - shortness of breath  Cardiovascular ROS: negative for - chest pain    Current Outpatient Medications   Medication Sig    clindamycin (CLEOCIN) 300 mg capsule Take 1 Capsule by mouth three (3) times daily for 7 days.  LORazepam (ATIVAN) 1 mg tablet TAKE 1 TABLET BY MOUTH NIGHTLY AS NEEDED FOR ANXIETY. MAX DAILY AMOUNT: 1 MG    triamterene-hydroCHLOROthiazide (DYAZIDE) 37.5-25 mg per capsule Take 1 Capsule by mouth daily.  glipiZIDE SR (GLUCOTROL XL) 10 mg CR tablet Take 1 Tablet by mouth daily.  atorvastatin (LIPITOR) 20 mg tablet TAKE 1 TABLET BY MOUTH ONCE DAILY IN THE EVENING    metFORMIN (GLUCOPHAGE) 500 mg tablet Take 2 Tablets by mouth two (2) times daily (with meals). No current facility-administered medications for this visit. OBJECTIVE:  alert, well appearing, and in no distress  Visit Vitals  /74 (BP 1 Location: Left arm, BP Patient Position: Sitting, BP Cuff Size: Adult)   Pulse 79   Temp 97.8 °F (36.6 °C) (Temporal)   Resp 20   Ht 6' 1\" (1.854 m)   Wt 245 lb (111.1 kg)   SpO2 97%   BMI 32.32 kg/m²      well developed and well nourished  Skin -epidermoid cyst about 2 cm in diameter,  erythematous with pinhole fistula without any drainage currently.   Painful to palpation        Assessment/Plan      ICD-10-CM ICD-9-CM 1. Infected epidermoid cyst  L72.0 706. 2  patient will continue warm compresses and clindamycin and follow-up with dermatology for possible excision    L08.9       Follow-up and Dispositions    · Return if symptoms worsen or fail to improve. Reviewed plan of care. Patient has provided input and agrees with goals.

## 2022-07-25 ENCOUNTER — APPOINTMENT (OUTPATIENT)
Dept: INTERNAL MEDICINE CLINIC | Age: 77
End: 2022-07-25

## 2022-07-26 LAB
ALBUMIN SERPL-MCNC: 4.6 G/DL (ref 3.7–4.7)
ALBUMIN/CREAT UR: 27 MG/G CREAT (ref 0–29)
ALBUMIN/GLOB SERPL: 2.2 {RATIO} (ref 1.2–2.2)
ALP SERPL-CCNC: 60 IU/L (ref 44–121)
ALT SERPL-CCNC: 19 IU/L (ref 0–44)
AST SERPL-CCNC: 14 IU/L (ref 0–40)
BILIRUB SERPL-MCNC: 0.4 MG/DL (ref 0–1.2)
BUN SERPL-MCNC: 21 MG/DL (ref 8–27)
BUN/CREAT SERPL: 24 (ref 10–24)
CALCIUM SERPL-MCNC: 9.4 MG/DL (ref 8.6–10.2)
CHLORIDE SERPL-SCNC: 101 MMOL/L (ref 96–106)
CHOLEST SERPL-MCNC: 152 MG/DL (ref 100–199)
CO2 SERPL-SCNC: 24 MMOL/L (ref 20–29)
CREAT SERPL-MCNC: 0.86 MG/DL (ref 0.76–1.27)
CREAT UR-MCNC: 75.2 MG/DL
EGFR: 90 ML/MIN/1.73
GLOBULIN SER CALC-MCNC: 2.1 G/DL (ref 1.5–4.5)
GLUCOSE SERPL-MCNC: 170 MG/DL (ref 65–99)
HBA1C MFR BLD: 8.3 % (ref 4.8–5.6)
HDLC SERPL-MCNC: 44 MG/DL
IMP & REVIEW OF LAB RESULTS: NORMAL
LDLC SERPL CALC-MCNC: 84 MG/DL (ref 0–99)
MICROALBUMIN UR-MCNC: 20.6 UG/ML
POTASSIUM SERPL-SCNC: 4.2 MMOL/L (ref 3.5–5.2)
PROT SERPL-MCNC: 6.7 G/DL (ref 6–8.5)
SODIUM SERPL-SCNC: 139 MMOL/L (ref 134–144)
TRIGL SERPL-MCNC: 139 MG/DL (ref 0–149)
VLDLC SERPL CALC-MCNC: 24 MG/DL (ref 5–40)

## 2022-08-01 ENCOUNTER — TELEPHONE (OUTPATIENT)
Dept: INTERNAL MEDICINE CLINIC | Age: 77
End: 2022-08-01

## 2022-08-01 ENCOUNTER — OFFICE VISIT (OUTPATIENT)
Dept: INTERNAL MEDICINE CLINIC | Age: 77
End: 2022-08-01
Payer: MEDICARE

## 2022-08-01 VITALS
HEIGHT: 73 IN | SYSTOLIC BLOOD PRESSURE: 147 MMHG | DIASTOLIC BLOOD PRESSURE: 78 MMHG | OXYGEN SATURATION: 96 % | HEART RATE: 75 BPM | WEIGHT: 244 LBS | TEMPERATURE: 97.3 F | RESPIRATION RATE: 16 BRPM | BODY MASS INDEX: 32.34 KG/M2

## 2022-08-01 DIAGNOSIS — G47.33 OBSTRUCTIVE SLEEP APNEA SYNDROME: ICD-10-CM

## 2022-08-01 DIAGNOSIS — E78.5 HYPERLIPIDEMIA LDL GOAL <100: ICD-10-CM

## 2022-08-01 DIAGNOSIS — F41.9 ANXIETY: ICD-10-CM

## 2022-08-01 DIAGNOSIS — E11.65 TYPE 2 DIABETES MELLITUS WITH HYPERGLYCEMIA, WITHOUT LONG-TERM CURRENT USE OF INSULIN (HCC): Primary | ICD-10-CM

## 2022-08-01 DIAGNOSIS — I10 ESSENTIAL HYPERTENSION WITH GOAL BLOOD PRESSURE LESS THAN 140/90: ICD-10-CM

## 2022-08-01 PROCEDURE — 99214 OFFICE O/P EST MOD 30 MIN: CPT | Performed by: INTERNAL MEDICINE

## 2022-08-01 PROCEDURE — G8427 DOCREV CUR MEDS BY ELIG CLIN: HCPCS | Performed by: INTERNAL MEDICINE

## 2022-08-01 PROCEDURE — 3017F COLORECTAL CA SCREEN DOC REV: CPT | Performed by: INTERNAL MEDICINE

## 2022-08-01 PROCEDURE — 1101F PT FALLS ASSESS-DOCD LE1/YR: CPT | Performed by: INTERNAL MEDICINE

## 2022-08-01 PROCEDURE — 2022F DILAT RTA XM EVC RTNOPTHY: CPT | Performed by: INTERNAL MEDICINE

## 2022-08-01 PROCEDURE — G8536 NO DOC ELDER MAL SCRN: HCPCS | Performed by: INTERNAL MEDICINE

## 2022-08-01 PROCEDURE — 3052F HG A1C>EQUAL 8.0%<EQUAL 9.0%: CPT | Performed by: INTERNAL MEDICINE

## 2022-08-01 PROCEDURE — G8754 DIAS BP LESS 90: HCPCS | Performed by: INTERNAL MEDICINE

## 2022-08-01 PROCEDURE — G8753 SYS BP > OR = 140: HCPCS | Performed by: INTERNAL MEDICINE

## 2022-08-01 PROCEDURE — G8510 SCR DEP NEG, NO PLAN REQD: HCPCS | Performed by: INTERNAL MEDICINE

## 2022-08-01 PROCEDURE — G8417 CALC BMI ABV UP PARAM F/U: HCPCS | Performed by: INTERNAL MEDICINE

## 2022-08-01 PROCEDURE — 1123F ACP DISCUSS/DSCN MKR DOCD: CPT | Performed by: INTERNAL MEDICINE

## 2022-08-01 RX ORDER — LORAZEPAM 1 MG/1
1 TABLET ORAL
Qty: 30 TABLET | Refills: 5 | Status: SHIPPED | OUTPATIENT
Start: 2022-08-01

## 2022-08-01 NOTE — PROGRESS NOTES
Patient is in the office today for a 6 month follow up. Do you have an Advance Directive no  Do you want more information : information given     1. \"Have you been to the ER, urgent care clinic since your last visit? Hospitalized since your last visit? \" No    2. \"Have you seen or consulted any other health care providers outside of the [address] [place] [address] since your last visit? \"  Yes, [facility]. 3. For patients aged 39-70: Has the patient had a colonoscopy / FIT/ Cologuard? NA - based on age      If the patient is female:    4. For patients aged 41-77: Has the patient had a mammogram within the past 2 years? NA - based on age or sex      11. For patients aged 21-65: Has the patient had a pap smear?  NA - based on age or sex

## 2022-08-01 NOTE — PROGRESS NOTES
Subjective:       Chief Complaint  The patient presents for follow up of diabetes, hypertension and high cholesterol. RANJANA Laureano is a 77 y.o. male seen for follow up of diabetes. Healso has hypertension and hyperlipidemia. Diabetes no significant medication side effects noted, borderline controlled, on Glucotrol Xl 10 mg and metformin 1 gm BID, pt will try to improve with some weight loss,  hypertension uncontrolled, no significant medication side effects noted, on Dyazide, consider ARB if pt develops proteinuria,   hyperlipidemia reasonably well controlled, no significant medication side effects noted, on Lipitor 20 mg. Will try to improve further with cutting back sugar and starches in diet    Diet and Lifestyle: generally follows a low fat low cholesterol diet, follows a diabetic diet regularly, exercises regularly, by walking    Home BP Monitoring: is not measured at home. Diabetic Review of Systems - home glucose monitoring: is not performed. Other symptoms and concerns: Anxiety Review:  Patient is seen for anxiety disorder. Current treatment includes Ativan and no other therapies. Pt trying to wean off the Ativan and has not had it in a week  Ongoing symptoms include: none. Patient denies: palpitations, racing thoughts. Reported side effects from the treatment: none. Patient is mainly using the Ativan to help sleep at night. Patient has sleep apnea but is not using his CPAP machine due to too much pressure. Patient encouraged to follow-up with a sleep specialist to have it adjusted. Patient has multiple cysts on his skin for which he sees dermatology as well as plastic surgery. Discussed the patient's BMI with him. The BMI follow up plan is as follows: I have counseled this patient on diet and exercise regimens. Current Outpatient Medications   Medication Sig    LORazepam (ATIVAN) 1 mg tablet Take 1 Tablet by mouth nightly as needed for Anxiety.  Max Daily Amount: 1 mg.    atorvastatin (LIPITOR) 20 mg tablet TAKE 1 TABLET BY MOUTH ONCE DAILY IN THE EVENING    glipiZIDE SR (GLUCOTROL XL) 10 mg CR tablet Take 1 tablet by mouth once daily    triamterene-hydroCHLOROthiazide (DYAZIDE) 37.5-25 mg per capsule Take 1 capsule by mouth once daily    metFORMIN (GLUCOPHAGE) 500 mg tablet TAKE 2 TABLETS BY MOUTH TWICE DAILY WITH MEALS     No current facility-administered medications for this visit.              Review of Systems  Respiratory: negative for dyspnea on exertion  Cardiovascular: negative for chest pain    Objective:     Visit Vitals  BP (!) 147/78 (BP 1 Location: Right arm, BP Patient Position: Sitting, BP Cuff Size: Adult)   Pulse 75   Temp 97.3 °F (36.3 °C) (Temporal)   Resp 16   Ht 6' 1\" (1.854 m)   Wt 244 lb (110.7 kg)   SpO2 96%   BMI 32.19 kg/m²        Chest - clear to auscultation, no wheezes, rales or rhonchi, symmetric air entry  Heart - normal rate, regular rhythm, normal S1, S2, no murmurs, rubs, clicks or gallops  Extremities - peripheral pulses normal, no pedal edema, no clubbing or cyanosis      Labs:   Lab Results   Component Value Date/Time    Cholesterol, total 152 07/25/2022 08:11 AM    HDL Cholesterol 44 07/25/2022 08:11 AM    LDL, calculated 84 07/25/2022 08:11 AM    LDL, calculated 77 06/25/2020 08:26 AM    Triglyceride 139 07/25/2022 08:11 AM    CHOL/HDL Ratio 3.3 02/21/2020 08:20 AM     Lab Results   Component Value Date/Time    Prostate Specific Ag 3.0 01/25/2022 12:00 AM    Prostate Specific Ag 3.5 07/03/2019 08:47 AM    Prostate Specific Ag 3.4 06/27/2018 07:35 AM    Prostate Specific Ag 2.0 01/10/2011 12:00 AM    Prostate Specific Ag 2.0 01/18/2010 10:50 AM     Lab Results   Component Value Date/Time    Sodium 139 07/25/2022 08:11 AM    Potassium 4.2 07/25/2022 08:11 AM    Chloride 101 07/25/2022 08:11 AM    CO2 24 07/25/2022 08:11 AM    Anion gap 5 02/21/2020 08:20 AM    Glucose 170 (H) 07/25/2022 08:11 AM    BUN 21 07/25/2022 08:11 AM Creatinine 0.86 07/25/2022 08:11 AM    BUN/Creatinine ratio 24 07/25/2022 08:11 AM    GFR est AA 97 01/25/2022 12:00 AM    GFR est non-AA 84 01/25/2022 12:00 AM    Calcium 9.4 07/25/2022 08:11 AM    Bilirubin, total 0.4 07/25/2022 08:11 AM    ALT (SGPT) 19 07/25/2022 08:11 AM    Alk. phosphatase 60 07/25/2022 08:11 AM    Protein, total 6.7 07/25/2022 08:11 AM    Albumin 4.6 07/25/2022 08:11 AM    Globulin 3.4 02/21/2020 08:20 AM    A-G Ratio 2.2 07/25/2022 08:11 AM      Lab Results   Component Value Date/Time    Hemoglobin A1c 8.3 (H) 07/25/2022 08:11 AM            Assessment / Plan     Diabetes borderline controlled, on Glucotrol and Metformin. Patient will work on cutting back starches and sweets in his diet to improve further. Hypertension  uncontrolled, on Dyazide daily. Patient will try and monitor blood pressure but if not improving would add an ARB. Hyperlipidemia reasonably well controlled, Lipitor 20 mg daily. Will try to improve further by cutting back starches an sugar in diet. ICD-10-CM ICD-9-CM    1. Type 2 diabetes mellitus with hyperglycemia, without long-term current use of insulin (HCC)  E11.65 250.00 HEMOGLOBIN A1C W/O EAG     790.29       2. Essential hypertension with goal blood pressure less than 140/90  I95 089.0 METABOLIC PANEL, COMPREHENSIVE      3. Hyperlipidemia LDL goal <100  E78.5 272.4 LIPID PANEL      4. Anxiety  F41.9 300.00 Controlled on LORazepam (ATIVAN) 1 mg tablet      5. Obstructive sleep apnea syndrome  G47.33 327.23 Patient is unable to use CPAP due to increased pressure. He is encouraged to follow-up with his sleep specialist for further evaluation. Diabetic issues reviewed with him: diabetic diet discussed in detail, and low cholesterol diet, weight control and daily exercise discussed. Follow-up and Dispositions    Return in about 3 months (around 11/1/2022) for labs 1 week before. Reviewed plan of care.  Patient has provided input and agrees with goals.

## 2022-11-01 ENCOUNTER — APPOINTMENT (OUTPATIENT)
Dept: INTERNAL MEDICINE CLINIC | Age: 77
End: 2022-11-01

## 2022-11-08 ENCOUNTER — OFFICE VISIT (OUTPATIENT)
Dept: INTERNAL MEDICINE CLINIC | Age: 77
End: 2022-11-08
Payer: MEDICARE

## 2022-11-08 VITALS
DIASTOLIC BLOOD PRESSURE: 76 MMHG | OXYGEN SATURATION: 94 % | BODY MASS INDEX: 32.2 KG/M2 | WEIGHT: 243 LBS | HEART RATE: 75 BPM | SYSTOLIC BLOOD PRESSURE: 150 MMHG | TEMPERATURE: 97.3 F | RESPIRATION RATE: 16 BRPM | HEIGHT: 73 IN

## 2022-11-08 DIAGNOSIS — E78.5 HYPERLIPIDEMIA LDL GOAL <100: ICD-10-CM

## 2022-11-08 DIAGNOSIS — E11.65 TYPE 2 DIABETES MELLITUS WITH HYPERGLYCEMIA, WITHOUT LONG-TERM CURRENT USE OF INSULIN (HCC): ICD-10-CM

## 2022-11-08 DIAGNOSIS — I10 ESSENTIAL HYPERTENSION WITH GOAL BLOOD PRESSURE LESS THAN 140/90: ICD-10-CM

## 2022-11-08 DIAGNOSIS — Z00.00 MEDICARE ANNUAL WELLNESS VISIT, SUBSEQUENT: Primary | ICD-10-CM

## 2022-11-08 PROCEDURE — 1123F ACP DISCUSS/DSCN MKR DOCD: CPT | Performed by: INTERNAL MEDICINE

## 2022-11-08 PROCEDURE — 1101F PT FALLS ASSESS-DOCD LE1/YR: CPT | Performed by: INTERNAL MEDICINE

## 2022-11-08 PROCEDURE — G8427 DOCREV CUR MEDS BY ELIG CLIN: HCPCS | Performed by: INTERNAL MEDICINE

## 2022-11-08 PROCEDURE — 3074F SYST BP LT 130 MM HG: CPT | Performed by: INTERNAL MEDICINE

## 2022-11-08 PROCEDURE — G8753 SYS BP > OR = 140: HCPCS | Performed by: INTERNAL MEDICINE

## 2022-11-08 PROCEDURE — G8754 DIAS BP LESS 90: HCPCS | Performed by: INTERNAL MEDICINE

## 2022-11-08 PROCEDURE — G0439 PPPS, SUBSEQ VISIT: HCPCS | Performed by: INTERNAL MEDICINE

## 2022-11-08 PROCEDURE — G8510 SCR DEP NEG, NO PLAN REQD: HCPCS | Performed by: INTERNAL MEDICINE

## 2022-11-08 PROCEDURE — 99214 OFFICE O/P EST MOD 30 MIN: CPT | Performed by: INTERNAL MEDICINE

## 2022-11-08 PROCEDURE — 3078F DIAST BP <80 MM HG: CPT | Performed by: INTERNAL MEDICINE

## 2022-11-08 PROCEDURE — 3052F HG A1C>EQUAL 8.0%<EQUAL 9.0%: CPT | Performed by: INTERNAL MEDICINE

## 2022-11-08 PROCEDURE — G8417 CALC BMI ABV UP PARAM F/U: HCPCS | Performed by: INTERNAL MEDICINE

## 2022-11-08 PROCEDURE — G8536 NO DOC ELDER MAL SCRN: HCPCS | Performed by: INTERNAL MEDICINE

## 2022-11-08 NOTE — PROGRESS NOTES
This is the Subsequent Medicare Annual Wellness Exam, performed 12 months or more after the Initial AWV or the last Subsequent AWV    I have reviewed the patient's medical history in detail and updated the computerized patient record. Assessment/Plan   Education and counseling provided:  Are appropriate based on today's review and evaluation    1. Medicare annual wellness visit, subsequent  2. Type 2 diabetes mellitus with hyperglycemia, without long-term current use of insulin (HCC)  -     HEMOGLOBIN A1C W/O EAG; Future  3. Essential hypertension with goal blood pressure less than 010/54  -     METABOLIC PANEL, COMPREHENSIVE; Future  4. Hyperlipidemia LDL goal <100  -     LIPID PANEL; Future     Depression Risk Factor Screening     3 most recent PHQ Screens 11/8/2022   Little interest or pleasure in doing things Not at all   Feeling down, depressed, irritable, or hopeless Not at all   Total Score PHQ 2 0       Alcohol & Drug Abuse Risk Screen    Do you average more than 1 drink per night or more than 7 drinks a week: No    In the past three months have you have had more than 4 drinks containing alcohol on one occasion: No          Functional Ability and Level of Safety    Hearing: The patient wears hearing aids. Activities of Daily Living: The home contains: no safety equipment. Patient does total self care      Ambulation: with no difficulty     Fall Risk:  Fall Risk Assessment, last 12 mths 11/8/2022   Able to walk? Yes   Fall in past 12 months? 0   Do you feel unsteady? -   Are you worried about falling 0   Number of falls in past 12 months -   Fall with injury? -      Abuse Screen:  Patient is not abused       Cognitive Screening    Has your family/caregiver stated any concerns about your memory: no     Cognitive Screening: Normal - .     Health Maintenance Due     Health Maintenance Due   Topic Date Due    DTaP/Tdap/Td series (1 - Tdap) Never done    Foot Exam Q1  07/17/2020    COVID-19 Vaccine (3 - Booster for [place] series) 11/21/2021   Glaucoma Screening- MYEYEDR  Pneumonia Vaccine-  UTD  Shingles Vaccine- UTD  Tdap Vaccine- not UTD  Colonoscopy-  Dr Pepe Morin 5/2020 f/u in 3 years  PSA- 1/2022 3.0. no further testing due to age  Advance Directive-  Pt made aware and has information     Patient Care Team   Patient Care Team:  Leopoldo Seitz, MD as PCP - General (Internal Medicine Physician)  Leopoldo Seitz, MD as PCP - [facility] Provider  Ela Cárdenas MD (Gastroenterology)  Cathi Hudson, JOSE as [facility] (Internal Medicine Physician)  Zarina Del Rio DO (Pulmonary Disease)  Mitzy Torres DO (Dermatology Physician)  Gabo Sheridan MD (Plastic Surgery)  Tana Kellogg [facility])    History     Patient Active Problem List   Diagnosis Code    Hyperlipidemia LDL goal <100 E78.5    Primary hypertension I10    Primary insomnia F51.01    Elevated PSA, less than 10 ng/ml R97.20    Type 2 diabetes mellitus without complication, without long-term current use of insulin (HCC) E11.9    Obstructive sleep apnea syndrome G47.33    Chronic insomnia F51.04    Obesity (BMI 30-39. 9) E66.9     Past Medical History:   Diagnosis Date    Diabetes ([address]     HLD (hyperlipidemia)     Hypertension     Obesity     Sleep apnea       Past Surgical History:   Procedure Laterality Date    HX COLONOSCOPY  4/30/15    adenomatous polyps, 5 years. Dr. Rupesh Polanco [facility]      tooth extraction     Current Outpatient Medications   Medication Sig Dispense Refill    LORazepam (ATIVAN) 1 mg tablet Take 1 Tablet by mouth nightly as needed for Anxiety.  Max Daily Amount: 1 mg. 30 Tablet 5    atorvastatin (LIPITOR) 20 mg tablet TAKE 1 TABLET BY MOUTH ONCE DAILY IN THE EVENING 90 Tablet 1    glipiZIDE SR (GLUCOTROL XL) 10 mg CR tablet Take 1 tablet by mouth once daily 90 Tablet 1    triamterene-hydroCHLOROthiazide (DYAZIDE) 37.5-25 mg per capsule Take 1 capsule by mouth once daily 90 Capsule 1    metFORMIN (GLUCOPHAGE) 500 mg tablet TAKE 2 TABLETS BY MOUTH TWICE DAILY WITH MEALS 360 Tablet 1     Allergies   Allergen Reactions    Ace Inhibitors Cough       Family History   Problem Relation Age of Onset    Cancer Mother         ovarian cancer    Diabetes Father     Heart Disease Father     Cancer Maternal Grandfather         unknown type    Cancer Sister         \"everywhere\"     Social History     Tobacco Use    Smoking status: Former     Packs/day: 1.00     Years: 12.00     Pack years: 12.00     Types: Cigarettes     Quit date: 1978     Years since quittin.7    Smokeless tobacco: Never   Substance Use Topics    Alcohol use: Yes     Alcohol/week: 0.0 standard drinks     Types: 1 - 2 Cans of beer per week     A comprehensive 5 year plan for medical care and screening exams was reviewed with pt and they received a copy of it.         Patricia Baldwin MD

## 2022-11-08 NOTE — PROGRESS NOTES
Subjective:       Chief Complaint  The patient presents for follow up of diabetes, hypertension and high cholesterol. RANJANA Hughes is a 69 y.o. male seen for follow up of diabetes. Healso has hypertension and hyperlipidemia. Diabetes no significant medication side effects noted, uncontrolled, on Glucotrol Xl 10 mg and metformin 1 gm BID, pt will try to improve with some weight loss otherwise would have to consider SGLT-2,  hypertension uncontrolled, no significant medication side effects noted, on Dyazide, pt reluctant to start ARB which was recommended,   hyperlipidemia borderline controlled, no significant medication side effects noted, on Lipitor 20 mg. Will try to improve further with cutting back sugar and starches in diet    Diet and Lifestyle: generally follows a low fat low cholesterol diet, follows a diabetic diet regularly, exercises regularly, by walking    Home BP Monitoring: is not measured at home but is encouraged to start monitoring BP. Diabetic Review of Systems - home glucose monitoring: is not performed. Other symptoms and concerns: Anxiety Review:  Patient is seen for anxiety disorder. Current treatment includes Ativan and no other therapies. Ongoing symptoms include: none. Patient denies: palpitations, racing thoughts. Reported side effects from the treatment: none. Patient is mainly using the Ativan to help sleep at night. Patient has sleep apnea but is not using his CPAP machine due to too much pressure. Patient encouraged to follow-up with a sleep specialist to have it adjusted. Patient has multiple cysts on his skin for which he sees dermatology as well as plastic surgery. Discussed the patient's BMI with him. The BMI follow up plan is as follows: I have counseled this patient on diet and exercise regimens.       Current Outpatient Medications   Medication Sig    LORazepam (ATIVAN) 1 mg tablet Take 1 Tablet by mouth nightly as needed for Anxiety. Max Daily Amount: 1 mg.    atorvastatin (LIPITOR) 20 mg tablet TAKE 1 TABLET BY MOUTH ONCE DAILY IN THE EVENING    glipiZIDE SR (GLUCOTROL XL) 10 mg CR tablet Take 1 tablet by mouth once daily    triamterene-hydroCHLOROthiazide (DYAZIDE) 37.5-25 mg per capsule Take 1 capsule by mouth once daily    metFORMIN (GLUCOPHAGE) 500 mg tablet TAKE 2 TABLETS BY MOUTH TWICE DAILY WITH MEALS     No current facility-administered medications for this visit.              Review of Systems  Respiratory: negative for dyspnea on exertion  Cardiovascular: negative for chest pain    Objective:     Visit Vitals  BP (!) 150/76   Pulse 75   Temp 97.3 °F (36.3 °C) (Temporal)   Resp 16   Ht 6' 1\" (1.854 m)   Wt 243 lb (110.2 kg)   SpO2 94%   BMI 32.06 kg/m²        Chest - clear to auscultation, no wheezes, rales or rhonchi, symmetric air entry  Heart - normal rate, regular rhythm, normal S1, S2, no murmurs, rubs, clicks or gallops  Extremities - peripheral pulses normal, no pedal edema, no clubbing or cyanosis      Labs:   Lab Results   Component Value Date/Time    Cholesterol, total 145 11/01/2022 10:58 AM    HDL Cholesterol 42 11/01/2022 10:58 AM    LDL, calculated 83 11/01/2022 10:58 AM    LDL, calculated 77 06/25/2020 08:26 AM    Triglyceride 110 11/01/2022 10:58 AM    CHOL/HDL Ratio 3.3 02/21/2020 08:20 AM     Lab Results   Component Value Date/Time    Prostate Specific Ag 3.0 01/25/2022 12:00 AM    Prostate Specific Ag 3.5 07/03/2019 08:47 AM    Prostate Specific Ag 3.4 06/27/2018 07:35 AM    Prostate Specific Ag 2.0 01/10/2011 12:00 AM    Prostate Specific Ag 2.0 01/18/2010 10:50 AM     Lab Results   Component Value Date/Time    Sodium 141 11/01/2022 10:58 AM    Potassium 4.2 11/01/2022 10:58 AM    Chloride 101 11/01/2022 10:58 AM    CO2 26 11/01/2022 10:58 AM    Anion gap 5 02/21/2020 08:20 AM    Glucose 123 (H) 11/01/2022 10:58 AM    BUN 18 11/01/2022 10:58 AM    Creatinine 0.94 11/01/2022 10:58 AM    BUN/Creatinine ratio 19 11/01/2022 10:58 AM    GFR est AA 97 01/25/2022 12:00 AM    GFR est non-AA 84 01/25/2022 12:00 AM    Calcium 9.3 11/01/2022 10:58 AM    Bilirubin, total 0.6 11/01/2022 10:58 AM    ALT (SGPT) 18 11/01/2022 10:58 AM    Alk. phosphatase 60 11/01/2022 10:58 AM    Protein, total 7.0 11/01/2022 10:58 AM    Albumin 4.5 11/01/2022 10:58 AM    Globulin 3.4 02/21/2020 08:20 AM    A-G Ratio 1.8 11/01/2022 10:58 AM      Lab Results   Component Value Date/Time    Hemoglobin A1c 8.4 (H) 11/01/2022 10:58 AM            Assessment / Plan     Diabetes uncontrolled, on Glucotrol and Metformin. Patient will work on cutting back starches and sweets in his diet. If unable would add SGLT-2  Hypertension  uncontrolled, on Dyazide daily. Patient will try and monitor blood pressure but if not improving would add an ARB. Hyperlipidemia borderline controlled, Lipitor 20 mg daily. Will try to improve further by cutting back starches an sugar in diet. ICD-10-CM ICD-9-CM    1. Medicare annual wellness visit, subsequent  Z00.00 V70.0       2. Type 2 diabetes mellitus with hyperglycemia, without long-term current use of insulin (HCC)  E11.65 250.00 HEMOGLOBIN A1C W/O EAG     790.29       3. Essential hypertension with goal blood pressure less than 140/90  G93 527.4 METABOLIC PANEL, COMPREHENSIVE      4. Hyperlipidemia LDL goal <100  E78.5 272.4 LIPID PANEL               Diabetic issues reviewed with him: diabetic diet discussed in detail, and low cholesterol diet, weight control and daily exercise discussed. Follow-up and Dispositions    Return in about 3 months (around 2/8/2023) for labs 1 week before. Reviewed plan of care. Patient has provided input and agrees with goals.

## 2022-11-08 NOTE — PATIENT INSTRUCTIONS
Medicare Wellness Visit, Male    The best way to live healthy is to have a lifestyle where you eat a well-balanced diet, exercise regularly, limit alcohol use, and quit all forms of tobacco/nicotine, if applicable. Regular preventive services are another way to keep healthy. Preventive services (vaccines, screening tests, monitoring & exams) can help personalize your care plan, which helps you manage your own care. Screening tests can find health problems at the earliest stages, when they are easiest to treat. [facility] follows the current, evidence-based guidelines published by the [facility] Jevon Dom ([facility]) when recommending preventive services for our patients. Because we follow these guidelines, sometimes recommendations change over time as research supports it. (For example, a prostate screening blood test is no longer routinely recommended for men with no symptoms). Of course, you and your doctor may decide to screen more often for some diseases, based on your risk and co-morbidities (chronic disease you are already diagnosed with). Preventive services for you include:  - Medicare offers their members a free annual wellness visit, which is time for you and your primary care provider to discuss and plan for your preventive service needs. Take advantage of this benefit every year!  -All adults over age 72 should receive the recommended pneumonia vaccines. Current USPSTF guidelines recommend a series of two vaccines for the best pneumonia protection.   -All adults should have a flu vaccine yearly and tetanus vaccine every 10 years.  -All adults age 48 and older should receive the shingles vaccines (series of two vaccines).        -All adults age 38-68 who are overweight should have a diabetes screening test once every three years.   -Other screening tests & preventive services for persons with diabetes include: an eye exam to screen for diabetic retinopathy, a kidney function test, a foot exam, and stricter control over your cholesterol.   -Cardiovascular screening for adults with routine risk involves an electrocardiogram (ECG) at intervals determined by the provider.   -Colorectal cancer screening should be done for adults age 54-65 with no increased risk factors for colorectal cancer. There are a number of acceptable methods of screening for this type of cancer. Each test has its own benefits and drawbacks. Discuss with your provider what is most appropriate for you during your annual wellness visit. The different tests include: colonoscopy (considered the best screening method), a fecal occult blood test, a fecal DNA test, and sigmoidoscopy.  -All adults born between [facility] should be screened once for Hepatitis C.  -An Abdominal Aortic Aneurysm (AAA) Screening is recommended for men age 73-68 who has ever smoked in their lifetime.      Here is a list of your current Health Maintenance items (your personalized list of preventive services) with a due date:  Health Maintenance Due   Topic Date Due    DTaP/Tdap/Td  (1 - Tdap) Never done    Diabetic Foot Care  07/17/2020    COVID-19 Vaccine (3 - Booster for [place] series) 11/21/2021

## 2022-11-08 NOTE — PROGRESS NOTES
Melanie Chacko presents today for   Chief Complaint   Patient presents with    Diabetes       1. \"Have you been to the ER, urgent care clinic since your last visit? Hospitalized since your last visit? \" no    2. \"Have you seen or consulted any other health care providers outside of the [address] [place] [address] since your last visit? \" no     3. For patients aged 39-70: Has the patient had a colonoscopy / FIT/ Cologuard? NA - based on age      If the patient is female:    4. For patients aged 41-77: Has the patient had a mammogram within the past 2 years? NA - based on age or sex  See top three    5. For patients aged 21-65: Has the patient had a pap smear?  No

## 2023-02-04 DIAGNOSIS — E11.65 TYPE 2 DIABETES MELLITUS WITH HYPERGLYCEMIA, WITHOUT LONG-TERM CURRENT USE OF INSULIN (HCC): Primary | ICD-10-CM

## 2023-02-05 DIAGNOSIS — E78.5 HYPERLIPIDEMIA LDL GOAL <100: Primary | ICD-10-CM

## 2023-02-05 DIAGNOSIS — I10 ESSENTIAL HYPERTENSION WITH GOAL BLOOD PRESSURE LESS THAN 140/90: Primary | ICD-10-CM

## 2023-02-17 LAB
ALBUMIN SERPL-MCNC: 4.6 G/DL (ref 3.7–4.7)
ALBUMIN/GLOB SERPL: 2.1 {RATIO} (ref 1.2–2.2)
ALP SERPL-CCNC: 56 IU/L (ref 44–121)
ALT SERPL-CCNC: 21 IU/L (ref 0–44)
AST SERPL-CCNC: 12 IU/L (ref 0–40)
BILIRUB SERPL-MCNC: 0.4 MG/DL (ref 0–1.2)
BUN SERPL-MCNC: 18 MG/DL (ref 8–27)
BUN/CREAT SERPL: 20 (ref 10–24)
CALCIUM SERPL-MCNC: 9.3 MG/DL (ref 8.6–10.2)
CHLORIDE SERPL-SCNC: 100 MMOL/L (ref 96–106)
CHOLEST SERPL-MCNC: 158 MG/DL (ref 100–199)
CO2 SERPL-SCNC: 25 MMOL/L (ref 20–29)
CREAT SERPL-MCNC: 0.89 MG/DL (ref 0.76–1.27)
EGFRCR SERPLBLD CKD-EPI 2021: 89 ML/MIN/1.73
GLOBULIN SER CALC-MCNC: 2.2 G/DL (ref 1.5–4.5)
GLUCOSE SERPL-MCNC: 151 MG/DL (ref 70–99)
HBA1C MFR BLD: 7.4 % (ref 4.8–5.6)
HDLC SERPL-MCNC: 44 MG/DL
IMP & REVIEW OF LAB RESULTS: NORMAL
LDLC SERPL CALC-MCNC: 95 MG/DL (ref 0–99)
POTASSIUM SERPL-SCNC: 4.2 MMOL/L (ref 3.5–5.2)
PROT SERPL-MCNC: 6.8 G/DL (ref 6–8.5)
SODIUM SERPL-SCNC: 141 MMOL/L (ref 134–144)
SPECIMEN STATUS REPORT, ROLRST: NORMAL
TRIGL SERPL-MCNC: 106 MG/DL (ref 0–149)
VLDLC SERPL CALC-MCNC: 19 MG/DL (ref 5–40)

## 2023-02-26 SDOH — ECONOMIC STABILITY: FOOD INSECURITY: WITHIN THE PAST 12 MONTHS, THE FOOD YOU BOUGHT JUST DIDN'T LAST AND YOU DIDN'T HAVE MONEY TO GET MORE.: NEVER TRUE

## 2023-02-26 SDOH — ECONOMIC STABILITY: FOOD INSECURITY: WITHIN THE PAST 12 MONTHS, YOU WORRIED THAT YOUR FOOD WOULD RUN OUT BEFORE YOU GOT MONEY TO BUY MORE.: NEVER TRUE

## 2023-02-26 SDOH — ECONOMIC STABILITY: INCOME INSECURITY: HOW HARD IS IT FOR YOU TO PAY FOR THE VERY BASICS LIKE FOOD, HOUSING, MEDICAL CARE, AND HEATING?: NOT VERY HARD

## 2023-02-26 SDOH — ECONOMIC STABILITY: TRANSPORTATION INSECURITY
IN THE PAST 12 MONTHS, HAS LACK OF TRANSPORTATION KEPT YOU FROM MEETINGS, WORK, OR FROM GETTING THINGS NEEDED FOR DAILY LIVING?: NO

## 2023-02-26 SDOH — ECONOMIC STABILITY: HOUSING INSECURITY
IN THE LAST 12 MONTHS, WAS THERE A TIME WHEN YOU DID NOT HAVE A STEADY PLACE TO SLEEP OR SLEPT IN A SHELTER (INCLUDING NOW)?: NO

## 2023-03-01 ENCOUNTER — OFFICE VISIT (OUTPATIENT)
Age: 78
End: 2023-03-01
Payer: MEDICARE

## 2023-03-01 VITALS
RESPIRATION RATE: 20 BRPM | WEIGHT: 241 LBS | HEART RATE: 68 BPM | OXYGEN SATURATION: 98 % | DIASTOLIC BLOOD PRESSURE: 73 MMHG | BODY MASS INDEX: 31.94 KG/M2 | TEMPERATURE: 98.6 F | HEIGHT: 73 IN | SYSTOLIC BLOOD PRESSURE: 148 MMHG

## 2023-03-01 DIAGNOSIS — E78.5 HYPERLIPIDEMIA LDL GOAL <100: ICD-10-CM

## 2023-03-01 DIAGNOSIS — I10 ESSENTIAL HYPERTENSION WITH GOAL BLOOD PRESSURE LESS THAN 140/90: ICD-10-CM

## 2023-03-01 DIAGNOSIS — Z12.5 PROSTATE CANCER SCREENING: ICD-10-CM

## 2023-03-01 DIAGNOSIS — E11.65 TYPE 2 DIABETES MELLITUS WITH HYPERGLYCEMIA, WITHOUT LONG-TERM CURRENT USE OF INSULIN (HCC): Primary | ICD-10-CM

## 2023-03-01 PROCEDURE — 3077F SYST BP >= 140 MM HG: CPT | Performed by: INTERNAL MEDICINE

## 2023-03-01 PROCEDURE — G8417 CALC BMI ABV UP PARAM F/U: HCPCS | Performed by: INTERNAL MEDICINE

## 2023-03-01 PROCEDURE — G8484 FLU IMMUNIZE NO ADMIN: HCPCS | Performed by: INTERNAL MEDICINE

## 2023-03-01 PROCEDURE — G8427 DOCREV CUR MEDS BY ELIG CLIN: HCPCS | Performed by: INTERNAL MEDICINE

## 2023-03-01 PROCEDURE — 1036F TOBACCO NON-USER: CPT | Performed by: INTERNAL MEDICINE

## 2023-03-01 PROCEDURE — 3078F DIAST BP <80 MM HG: CPT | Performed by: INTERNAL MEDICINE

## 2023-03-01 PROCEDURE — 99214 OFFICE O/P EST MOD 30 MIN: CPT | Performed by: INTERNAL MEDICINE

## 2023-03-01 PROCEDURE — 3051F HG A1C>EQUAL 7.0%<8.0%: CPT | Performed by: INTERNAL MEDICINE

## 2023-03-01 PROCEDURE — 99211 OFF/OP EST MAY X REQ PHY/QHP: CPT

## 2023-03-01 PROCEDURE — 1123F ACP DISCUSS/DSCN MKR DOCD: CPT | Performed by: INTERNAL MEDICINE

## 2023-03-01 SDOH — ECONOMIC STABILITY: INCOME INSECURITY: HOW HARD IS IT FOR YOU TO PAY FOR THE VERY BASICS LIKE FOOD, HOUSING, MEDICAL CARE, AND HEATING?: NOT HARD AT ALL

## 2023-03-01 SDOH — ECONOMIC STABILITY: FOOD INSECURITY: WITHIN THE PAST 12 MONTHS, YOU WORRIED THAT YOUR FOOD WOULD RUN OUT BEFORE YOU GOT MONEY TO BUY MORE.: NEVER TRUE

## 2023-03-01 SDOH — ECONOMIC STABILITY: FOOD INSECURITY: WITHIN THE PAST 12 MONTHS, THE FOOD YOU BOUGHT JUST DIDN'T LAST AND YOU DIDN'T HAVE MONEY TO GET MORE.: NEVER TRUE

## 2023-03-01 ASSESSMENT — PATIENT HEALTH QUESTIONNAIRE - PHQ9
SUM OF ALL RESPONSES TO PHQ QUESTIONS 1-9: 0
SUM OF ALL RESPONSES TO PHQ QUESTIONS 1-9: 0
SUM OF ALL RESPONSES TO PHQ9 QUESTIONS 1 & 2: 0
1. LITTLE INTEREST OR PLEASURE IN DOING THINGS: 0
SUM OF ALL RESPONSES TO PHQ QUESTIONS 1-9: 0
2. FEELING DOWN, DEPRESSED OR HOPELESS: 0
SUM OF ALL RESPONSES TO PHQ QUESTIONS 1-9: 0

## 2023-03-01 NOTE — PROGRESS NOTES
Subjective:       Chief Complaint  The patient presents for follow up of diabetes, hypertension and high cholesterol. HPI  Ana Duran is a 69 y.o. male seen for follow up of diabetes. Healso has hypertension and hyperlipidemia. Diabetes no significant medication side effects noted, uncontrolled, on Glucotrol Xl 10 mg and metformin 1 gm BID, pt will try to improve further with some weight loss otherwise would have to consider SGLT-2,  hypertension uncontrolled, no significant medication side effects noted, on Dyazide, pt reluctant to start ARB which was recommended, if not better with weight loss by next OV would again advise ARB,  hyperlipidemia borderline controlled, no significant medication side effects noted, on Lipitor 20 mg. Will try to improve further with cutting back sugar and starches in diet    Diet and Lifestyle: generally follows a low fat low cholesterol diet, follows a diabetic diet regularly, exercises regularly, by walking    Home BP Monitoring: is not measured at home but is encouraged to start monitoring BP. Diabetic Review of Systems - home glucose monitoring: is not performed. Other symptoms and concerns: Anxiety Review:  Patient is seen for anxiety disorder. Current treatment includes Ativan and no other therapies. Ongoing symptoms include: none. Patient denies: palpitations, racing thoughts. Reported side effects from the treatment: none. Patient is mainly using the Ativan to help sleep at night. Patient has sleep apnea but is not using his CPAP machine due to too much pressure. Patient encouraged to follow-up with a sleep specialist to have it adjusted. Patient has multiple cysts on his skin for which he sees dermatology as well as plastic surgery. Discussed the patient's BMI with him. The BMI follow up plan is as follows: I have counseled this patient on diet and exercise regimens.       Current Outpatient Medications   Medication Sig atorvastatin (LIPITOR) 20 MG tablet TAKE 1 TABLET BY MOUTH ONCE DAILY IN THE EVENING    glipiZIDE (GLUCOTROL XL) 10 MG extended release tablet Take 1 tablet by mouth once daily    LORazepam (ATIVAN) 1 MG tablet TAKE 1 TABLET BY MOUTH IN THE EVENING MAX  DAILY  AMOUNT  1  MG    metFORMIN (GLUCOPHAGE) 500 MG tablet TAKE 2 TABLETS BY MOUTH TWICE DAILY WITH MEALS    triamterene-hydroCHLOROthiazide (DYAZIDE) 37.5-25 MG per capsule Take 1 capsule by mouth once daily     No current facility-administered medications for this visit.              Review of Systems  Respiratory: negative for dyspnea on exertion  Cardiovascular: negative for chest pain    Objective:     Visit Vitals  BP (!) 148/73 (Site: Right Upper Arm, Position: Sitting, Cuff Size: Large Adult)   Pulse 68   Temp 98.6 °F (37 °C) (Temporal)   Resp 20   Ht 6' 1\" (1.854 m)   Wt 241 lb (109.3 kg)   SpO2 98%   BMI 31.80 kg/m²         Chest - clear to auscultation, no wheezes, rales or rhonchi, symmetric air entry  Heart - normal rate, regular rhythm, normal S1, S2, no murmurs, rubs, clicks or gallops  Extremities - peripheral pulses normal, no pedal edema, no clubbing or cyanosis      CMP:    Lab Results   Component Value Date/Time     02/16/2023 12:00 AM    K 4.2 02/16/2023 12:00 AM     02/16/2023 12:00 AM    CO2 25 02/16/2023 12:00 AM    BUN 18 02/16/2023 12:00 AM    CREATININE 0.89 02/16/2023 12:00 AM    GFRAA 97 01/25/2022 12:00 AM    AGRATIO 2.1 02/16/2023 12:00 AM    LABGLOM 89 02/16/2023 12:00 AM    GLUCOSE 151 02/16/2023 12:00 AM    PROT 6.8 02/16/2023 12:00 AM    LABALBU 4.6 02/16/2023 12:00 AM    CALCIUM 9.3 02/16/2023 12:00 AM    BILITOT 0.4 02/16/2023 12:00 AM    ALKPHOS 56 02/16/2023 12:00 AM    AST 12 02/16/2023 12:00 AM    ALT 21 02/16/2023 12:00 AM     HgBA1c:    Lab Results   Component Value Date/Time    LABA1C 7.4 02/16/2023 12:00 AM     Microalbumen/Creatinine ratio:  No components found for: RUCREAT  FLP:    Lab Results   Component Value Date/Time    TRIG 106 02/16/2023 12:00 AM    HDL 44 02/16/2023 12:00 AM    LDLCALC 95 02/16/2023 12:00 AM    LABVLDL 18 06/25/2020 08:26 AM     PSA:   Lab Results   Component Value Date/Time    PSA 3.0 01/25/2022 12:00 AM            Assessment / Plan     Diabetes uncontrolled, on Glucotrol and Metformin. Patient will work on cutting back starches and sweets in his diet. If unable would add SGLT-2  Hypertension  uncontrolled, on Dyazide daily. Patient will try and monitor blood pressure but if not improving would add an ARB. Hyperlipidemia borderline controlled, Lipitor 20 mg daily. Will try to improve further by cutting back starches and sugar in diet. ICD-10-CM    1. Type 2 diabetes mellitus with hyperglycemia, without long-term current use of insulin (HCC)  E11.65 Hemoglobin A1C     Microalbumin / Creatinine Urine Ratio      2. Hyperlipidemia LDL goal <100  E78.5 Lipid Panel      3. Essential hypertension with goal blood pressure less than 140/90  I10 Comprehensive Metabolic Panel      4. Prostate cancer screening  Z12.5 PSA Screening                    Diabetic issues reviewed with him: diabetic diet discussed in detail, and low cholesterol diet, weight control and daily exercise discussed. Return in about 4 months (around 7/1/2023) for labs 1 week before. Reviewed plan of care. Patient has provided input and agrees with goals.

## 2023-03-01 NOTE — PROGRESS NOTES
Unknown Gone presents today for   Chief Complaint   Patient presents with    Diabetes    Cholesterol Problem     4 month follow up      1. \"Have you been to the ER, urgent care clinic since your last visit? Hospitalized since your last visit? \" no    2. \"Have you seen or consulted any other health care providers outside of the [address] [place] [address] since your last visit? \" no     3. For patients aged 39-70: Has the patient had a colonoscopy / FIT/ Cologuard? NA - based on age      If the patient is female:    4. For patients aged 41-77: Has the patient had a mammogram within the past 2 years? NA - based on age or sex      11. For patients aged 21-65: Has the patient had a pap smear?  NA - based on age or sex

## 2023-03-02 RX ORDER — GLIPIZIDE 10 MG/1
TABLET, FILM COATED, EXTENDED RELEASE ORAL
Qty: 90 TABLET | Refills: 1 | Status: SHIPPED | OUTPATIENT
Start: 2023-03-02

## 2023-03-02 RX ORDER — TRIAMTERENE AND HYDROCHLOROTHIAZIDE 37.5; 25 MG/1; MG/1
CAPSULE ORAL
Qty: 90 CAPSULE | Refills: 1 | Status: SHIPPED | OUTPATIENT
Start: 2023-03-02

## 2023-05-31 RX ORDER — TRIAMTERENE AND HYDROCHLOROTHIAZIDE 37.5; 25 MG/1; MG/1
CAPSULE ORAL
Qty: 90 CAPSULE | Refills: 1 | Status: SHIPPED | OUTPATIENT
Start: 2023-05-31

## 2023-05-31 RX ORDER — GLIPIZIDE 10 MG/1
TABLET, FILM COATED, EXTENDED RELEASE ORAL
Qty: 90 TABLET | Refills: 1 | Status: SHIPPED | OUTPATIENT
Start: 2023-05-31

## 2023-06-11 NOTE — TELEPHONE ENCOUNTER
Patient is aware referral placed for [facility]. Patient states he seen Dr. Yenni Carrillo which is in the same group. Patient was given the phone number to call and schedule with Dermatologist of his choice.
Pt had visit with Dr. Chung Osborn a couple days ago. Says he forgot to ask for a referral to see dermatologist for possible Skin cancer on right cheek.      Wife cell [phone]
Referral placed
all other ROS negative except as per HPI

## 2023-07-01 RX ORDER — ATORVASTATIN CALCIUM 20 MG/1
TABLET, FILM COATED ORAL
Qty: 90 TABLET | Refills: 3 | Status: SHIPPED | OUTPATIENT
Start: 2023-07-01

## 2023-07-10 ENCOUNTER — HOSPITAL ENCOUNTER (OUTPATIENT)
Facility: HOSPITAL | Age: 78
Discharge: HOME | End: 2023-07-13
Payer: MEDICARE

## 2023-07-10 DIAGNOSIS — E11.65 TYPE 2 DIABETES MELLITUS WITH HYPERGLYCEMIA, WITHOUT LONG-TERM CURRENT USE OF INSULIN (HCC): ICD-10-CM

## 2023-07-10 DIAGNOSIS — E78.5 HYPERLIPIDEMIA LDL GOAL <100: ICD-10-CM

## 2023-07-10 DIAGNOSIS — Z12.5 PROSTATE CANCER SCREENING: ICD-10-CM

## 2023-07-10 DIAGNOSIS — I10 ESSENTIAL HYPERTENSION WITH GOAL BLOOD PRESSURE LESS THAN 140/90: ICD-10-CM

## 2023-07-10 LAB
ALBUMIN SERPL-MCNC: 3.8 G/DL (ref 3.4–5)
ALBUMIN/GLOB SERPL: 1.2 (ref 0.8–1.7)
ALP SERPL-CCNC: 53 U/L (ref 45–117)
ALT SERPL-CCNC: 26 U/L (ref 16–61)
ANION GAP SERPL CALC-SCNC: 6 MMOL/L (ref 3–18)
AST SERPL-CCNC: 11 U/L (ref 10–38)
BILIRUB SERPL-MCNC: 0.8 MG/DL (ref 0.2–1)
BUN SERPL-MCNC: 21 MG/DL (ref 7–18)
BUN/CREAT SERPL: 19 (ref 12–20)
CALCIUM SERPL-MCNC: 9.6 MG/DL (ref 8.5–10.1)
CHLORIDE SERPL-SCNC: 104 MMOL/L (ref 100–111)
CHOLEST SERPL-MCNC: 140 MG/DL
CO2 SERPL-SCNC: 30 MMOL/L (ref 21–32)
CREAT SERPL-MCNC: 1.08 MG/DL (ref 0.6–1.3)
CREAT UR-MCNC: 106 MG/DL (ref 30–125)
EST. AVERAGE GLUCOSE BLD GHB EST-MCNC: 186 MG/DL
GLOBULIN SER CALC-MCNC: 3.2 G/DL (ref 2–4)
GLUCOSE SERPL-MCNC: 180 MG/DL (ref 74–99)
HBA1C MFR BLD: 8.1 % (ref 4.2–5.6)
HDLC SERPL-MCNC: 48 MG/DL (ref 40–60)
HDLC SERPL: 2.9 (ref 0–5)
LDLC SERPL CALC-MCNC: 68 MG/DL (ref 0–100)
LIPID PANEL: NORMAL
MICROALBUMIN UR-MCNC: 3.07 MG/DL (ref 0–3)
MICROALBUMIN/CREAT UR-RTO: 29 MG/G (ref 0–30)
POTASSIUM SERPL-SCNC: 4.2 MMOL/L (ref 3.5–5.5)
PROT SERPL-MCNC: 7 G/DL (ref 6.4–8.2)
PSA SERPL-MCNC: 5.1 NG/ML (ref 0–4)
SODIUM SERPL-SCNC: 140 MMOL/L (ref 136–145)
TRIGL SERPL-MCNC: 120 MG/DL
VLDLC SERPL CALC-MCNC: 24 MG/DL

## 2023-07-10 PROCEDURE — 83036 HEMOGLOBIN GLYCOSYLATED A1C: CPT

## 2023-07-10 PROCEDURE — G0103 PSA SCREENING: HCPCS

## 2023-07-10 PROCEDURE — 80061 LIPID PANEL: CPT

## 2023-07-10 PROCEDURE — 80053 COMPREHEN METABOLIC PANEL: CPT

## 2023-07-10 PROCEDURE — 82570 ASSAY OF URINE CREATININE: CPT

## 2023-07-10 PROCEDURE — 82043 UR ALBUMIN QUANTITATIVE: CPT

## 2023-07-10 PROCEDURE — 36415 COLL VENOUS BLD VENIPUNCTURE: CPT

## 2023-07-20 ENCOUNTER — OFFICE VISIT (OUTPATIENT)
Age: 78
End: 2023-07-20
Payer: MEDICARE

## 2023-07-20 VITALS
DIASTOLIC BLOOD PRESSURE: 72 MMHG | SYSTOLIC BLOOD PRESSURE: 133 MMHG | HEIGHT: 73 IN | TEMPERATURE: 98.2 F | RESPIRATION RATE: 18 BRPM | OXYGEN SATURATION: 95 % | WEIGHT: 242 LBS | HEART RATE: 76 BPM | BODY MASS INDEX: 32.07 KG/M2

## 2023-07-20 DIAGNOSIS — R97.20 ELEVATED PSA: ICD-10-CM

## 2023-07-20 DIAGNOSIS — E78.5 HYPERLIPIDEMIA LDL GOAL <100: ICD-10-CM

## 2023-07-20 DIAGNOSIS — I10 ESSENTIAL HYPERTENSION WITH GOAL BLOOD PRESSURE LESS THAN 140/90: ICD-10-CM

## 2023-07-20 DIAGNOSIS — E11.65 TYPE 2 DIABETES MELLITUS WITH HYPERGLYCEMIA, WITHOUT LONG-TERM CURRENT USE OF INSULIN (HCC): Primary | ICD-10-CM

## 2023-07-20 PROCEDURE — 1123F ACP DISCUSS/DSCN MKR DOCD: CPT | Performed by: INTERNAL MEDICINE

## 2023-07-20 PROCEDURE — G8427 DOCREV CUR MEDS BY ELIG CLIN: HCPCS | Performed by: INTERNAL MEDICINE

## 2023-07-20 PROCEDURE — 99211 OFF/OP EST MAY X REQ PHY/QHP: CPT

## 2023-07-20 PROCEDURE — 3052F HG A1C>EQUAL 8.0%<EQUAL 9.0%: CPT | Performed by: INTERNAL MEDICINE

## 2023-07-20 PROCEDURE — 3075F SYST BP GE 130 - 139MM HG: CPT | Performed by: INTERNAL MEDICINE

## 2023-07-20 PROCEDURE — 1036F TOBACCO NON-USER: CPT | Performed by: INTERNAL MEDICINE

## 2023-07-20 PROCEDURE — 3078F DIAST BP <80 MM HG: CPT | Performed by: INTERNAL MEDICINE

## 2023-07-20 PROCEDURE — G8417 CALC BMI ABV UP PARAM F/U: HCPCS | Performed by: INTERNAL MEDICINE

## 2023-07-20 PROCEDURE — 99214 OFFICE O/P EST MOD 30 MIN: CPT | Performed by: INTERNAL MEDICINE

## 2023-07-20 NOTE — PROGRESS NOTES
Subjective:       Chief Complaint  The patient presents for follow up of diabetes, hypertension and high cholesterol. TIRSO Hou is a 69 y.o. male seen for follow up of diabetes. Ion has hypertension and hyperlipidemia. Diabetes no significant medication side effects noted, uncontrolled, on Glucotrol Xl 10 mg and metformin 1 gm BID, pt will try to improve further with some weight loss otherwise would have to consider SGLT-2/GLP-1,  hypertension well controlled, no significant medication side effects noted, on Dyazide,  hyperlipidemia well controlled, no significant medication side effects noted, on Lipitor 20 mg. Diet and Lifestyle: generally follows a low fat low cholesterol diet, does not follow a diabetic diet regularly, exercises regularly normally, by walking but not recently    Home BP Monitoring: is not measured at home but is encouraged to start monitoring BP. Diabetic Review of Systems - home glucose monitoring: is not performed. Other symptoms and concerns: Anxiety Review:  Patient is seen for anxiety disorder. Current treatment includes Ativan and no other therapies. Ongoing symptoms include: none. Patient denies: palpitations, racing thoughts. Reported side effects from the treatment: none. Patient is mainly using the Ativan to help sleep at night. Patient has sleep apnea but is not using his CPAP machine due to too much pressure. Patient encouraged to follow-up with a sleep specialist to have it adjusted. Patient has multiple cysts on his skin for which he sees dermatology as well as plastic surgery. Pt's PSA went up from 3 to 5.1. Will discussed to recheck in 3 months and if increasing would refer to urology         Discussed the patient's BMI with him. The BMI follow up plan is as follows: I have counseled this patient on diet and exercise regimens.       Current Outpatient Medications   Medication Sig    atorvastatin (LIPITOR) 20 MG tablet TAKE 1

## 2023-07-20 NOTE — PROGRESS NOTES
Anna Elmore presents today for   Chief Complaint   Patient presents with    Diabetes    Hypertension    Cholesterol Problem     4 month follow up        1. \"Have you been to the ER, urgent care clinic since your last visit? Hospitalized since your last visit? \" no    2. \"Have you seen or consulted any other health care providers outside of the [address] [place] [address] since your last visit? \" no     3. For patients aged 43-73: Has the patient had a colonoscopy / FIT/ Cologuard? NA - based on age      If the patient is female:    4. For patients aged 43-66: Has the patient had a mammogram within the past 2 years? NA - based on age or sex      11. For patients aged 21-65: Has the patient had a pap smear?  NA - based on age or sex

## 2023-08-10 DIAGNOSIS — E78.5 HYPERLIPIDEMIA LDL GOAL <100: Primary | ICD-10-CM

## 2023-08-10 DIAGNOSIS — F51.01 PRIMARY INSOMNIA: ICD-10-CM

## 2023-08-10 RX ORDER — LORAZEPAM 1 MG/1
TABLET ORAL
Qty: 30 TABLET | Refills: 3 | Status: SHIPPED | OUTPATIENT
Start: 2023-08-10 | End: 2023-12-08

## 2023-08-10 NOTE — TELEPHONE ENCOUNTER
PCP:  LESTER RIBEIRO MD    Last refill per chart:  LORazepam (ATIVAN) 1 MG tablet  Edit       Summary: TAKE 1 TABLET BY MOUTH IN THE EVENING MAX  DAILY  AMOUNT  1  MG   Start: 2/6/2023  Ord/Sold: 2/22/2023 (O)  Report  Taking:   Long-term:   Med Dose History       Patient Sig: TAKE 1 TABLET BY MOUTH IN THE EVENING MAX DAILY AMOUNT 1 MG       Ordered on: 2/22/2023          Future Appointments   Date Time Provider [address] [place] [address]   10/26/2023 10:45 AM [facility] LAB VISIT [facility] BS AMB   10/31/2023  8:20 AM Ivan Enrique MD [facility] BS AMB

## 2023-10-26 ENCOUNTER — NURSE ONLY (OUTPATIENT)
Age: 78
End: 2023-10-26

## 2023-10-26 DIAGNOSIS — R97.20 ELEVATED PSA: ICD-10-CM

## 2023-10-26 DIAGNOSIS — I10 ESSENTIAL HYPERTENSION WITH GOAL BLOOD PRESSURE LESS THAN 140/90: ICD-10-CM

## 2023-10-26 DIAGNOSIS — E11.65 TYPE 2 DIABETES MELLITUS WITH HYPERGLYCEMIA, WITHOUT LONG-TERM CURRENT USE OF INSULIN (HCC): ICD-10-CM

## 2023-10-26 DIAGNOSIS — E78.5 HYPERLIPIDEMIA LDL GOAL <100: ICD-10-CM

## 2023-10-27 LAB
ALBUMIN SERPL-MCNC: 4.6 G/DL (ref 3.8–4.8)
ALBUMIN/GLOB SERPL: 1.8 {RATIO} (ref 1.2–2.2)
ALP SERPL-CCNC: 62 IU/L (ref 44–121)
ALT SERPL-CCNC: 15 IU/L (ref 0–44)
AST SERPL-CCNC: 12 IU/L (ref 0–40)
BILIRUB SERPL-MCNC: 0.4 MG/DL (ref 0–1.2)
BUN SERPL-MCNC: 17 MG/DL (ref 8–27)
BUN/CREAT SERPL: 17 (ref 10–24)
CALCIUM SERPL-MCNC: 9.8 MG/DL (ref 8.6–10.2)
CHLORIDE SERPL-SCNC: 100 MMOL/L (ref 96–106)
CHOLEST SERPL-MCNC: 150 MG/DL (ref 100–199)
CO2 SERPL-SCNC: 25 MMOL/L (ref 20–29)
CREAT SERPL-MCNC: 0.99 MG/DL (ref 0.76–1.27)
EGFRCR SERPLBLD CKD-EPI 2021: 78 ML/MIN/1.73
GLOBULIN SER CALC-MCNC: 2.5 G/DL (ref 1.5–4.5)
GLUCOSE SERPL-MCNC: 165 MG/DL (ref 70–99)
HBA1C MFR BLD: 8.9 % (ref 4.8–5.6)
HDLC SERPL-MCNC: 42 MG/DL
LDLC SERPL CALC-MCNC: 86 MG/DL (ref 0–99)
POTASSIUM SERPL-SCNC: 4.8 MMOL/L (ref 3.5–5.2)
PROT SERPL-MCNC: 7.1 G/DL (ref 6–8.5)
SODIUM SERPL-SCNC: 141 MMOL/L (ref 134–144)
SPECIMEN STATUS REPORT: NORMAL
TRIGL SERPL-MCNC: 120 MG/DL (ref 0–149)
VLDLC SERPL CALC-MCNC: 22 MG/DL (ref 5–40)

## 2023-10-28 LAB — PSA SERPL-MCNC: 3.5 NG/ML (ref 0–4)

## 2023-10-31 ENCOUNTER — OFFICE VISIT (OUTPATIENT)
Age: 78
End: 2023-10-31
Payer: MEDICARE

## 2023-10-31 VITALS
SYSTOLIC BLOOD PRESSURE: 138 MMHG | OXYGEN SATURATION: 98 % | TEMPERATURE: 98.2 F | DIASTOLIC BLOOD PRESSURE: 82 MMHG | WEIGHT: 240 LBS | BODY MASS INDEX: 31.81 KG/M2 | HEIGHT: 73 IN | HEART RATE: 74 BPM | RESPIRATION RATE: 18 BRPM

## 2023-10-31 DIAGNOSIS — I10 ESSENTIAL HYPERTENSION WITH GOAL BLOOD PRESSURE LESS THAN 140/90: ICD-10-CM

## 2023-10-31 DIAGNOSIS — E11.65 TYPE 2 DIABETES MELLITUS WITH HYPERGLYCEMIA, WITHOUT LONG-TERM CURRENT USE OF INSULIN (HCC): Primary | ICD-10-CM

## 2023-10-31 DIAGNOSIS — R97.20 ELEVATED PSA: ICD-10-CM

## 2023-10-31 DIAGNOSIS — E78.5 HYPERLIPIDEMIA LDL GOAL <100: ICD-10-CM

## 2023-10-31 PROCEDURE — G8427 DOCREV CUR MEDS BY ELIG CLIN: HCPCS | Performed by: INTERNAL MEDICINE

## 2023-10-31 PROCEDURE — 99214 OFFICE O/P EST MOD 30 MIN: CPT | Performed by: INTERNAL MEDICINE

## 2023-10-31 PROCEDURE — 3052F HG A1C>EQUAL 8.0%<EQUAL 9.0%: CPT | Performed by: INTERNAL MEDICINE

## 2023-10-31 PROCEDURE — G8417 CALC BMI ABV UP PARAM F/U: HCPCS | Performed by: INTERNAL MEDICINE

## 2023-10-31 PROCEDURE — 3075F SYST BP GE 130 - 139MM HG: CPT | Performed by: INTERNAL MEDICINE

## 2023-10-31 PROCEDURE — 1036F TOBACCO NON-USER: CPT | Performed by: INTERNAL MEDICINE

## 2023-10-31 PROCEDURE — 1123F ACP DISCUSS/DSCN MKR DOCD: CPT | Performed by: INTERNAL MEDICINE

## 2023-10-31 PROCEDURE — G8484 FLU IMMUNIZE NO ADMIN: HCPCS | Performed by: INTERNAL MEDICINE

## 2023-10-31 PROCEDURE — 3079F DIAST BP 80-89 MM HG: CPT | Performed by: INTERNAL MEDICINE

## 2023-10-31 NOTE — PROGRESS NOTES
Subjective:       Chief Complaint  The patient presents for follow up of diabetes, hypertension and high cholesterol. TIRSO Vega is a 69 y.o.. male seen for follow up of diabetes. Healso has hypertension and hyperlipidemia. Diabetes no significant medication side effects noted, uncontrolled, on Glucotrol Xl 10 mg and metformin 1 gm BID, pt will try to improve further with some weight loss and cutting back starches and sugar in his diet, reluctant  to consider SGLT-2/GLP-1 due to cost,  hypertension well controlled, no significant medication side effects noted, on Dyazide,  hyperlipidemia well controlled, no significant medication side effects noted, on Lipitor 20 mg. Diet and Lifestyle: generally follows a low fat low cholesterol diet, does not follow a diabetic diet regularly, exercises regularly normally, by walking . Home BP Monitoring: is not measured at home but is encouraged to start monitoring BP. Diabetic Review of Systems - home glucose monitoring: is not performed. Other symptoms and concerns: Anxiety Review:  Patient is seen for anxiety disorder. Current treatment includes Ativan and no other therapies. Ongoing symptoms include: none. Patient denies: palpitations, racing thoughts. Reported side effects from the treatment: none. Patient is mainly using the Ativan to help sleep at night. Patient has sleep apnea but is not using his CPAP machine due to too much pressure. Patient encouraged to follow-up with a sleep specialist to have it adjusted. Patient has multiple cysts on his skin for which he sees dermatology as well as plastic surgery. Patient's PSA has fluctuated up to 5 but is now back down to 3.5. We will continue to monitor every 6 months. Discussed the patient's BMI with him. The BMI follow up plan is as follows: I have counseled this patient on diet and exercise regimens.       Current Outpatient Medications   Medication Sig

## 2023-12-10 DIAGNOSIS — F51.01 PRIMARY INSOMNIA: ICD-10-CM

## 2023-12-12 RX ORDER — LORAZEPAM 1 MG/1
1 TABLET ORAL NIGHTLY PRN
Qty: 30 TABLET | Refills: 3 | Status: SHIPPED | OUTPATIENT
Start: 2023-12-12 | End: 2024-04-10

## 2024-01-25 ENCOUNTER — NURSE ONLY (OUTPATIENT)
Age: 79
End: 2024-01-25

## 2024-01-25 DIAGNOSIS — I10 ESSENTIAL HYPERTENSION WITH GOAL BLOOD PRESSURE LESS THAN 140/90: ICD-10-CM

## 2024-01-25 DIAGNOSIS — E11.65 TYPE 2 DIABETES MELLITUS WITH HYPERGLYCEMIA, WITHOUT LONG-TERM CURRENT USE OF INSULIN (HCC): ICD-10-CM

## 2024-01-25 DIAGNOSIS — R97.20 ELEVATED PSA: ICD-10-CM

## 2024-01-25 DIAGNOSIS — E78.5 HYPERLIPIDEMIA LDL GOAL <100: ICD-10-CM

## 2024-01-26 LAB
ALBUMIN SERPL-MCNC: 4.3 G/DL (ref 3.8–4.8)
ALBUMIN/GLOB SERPL: 2 {RATIO} (ref 1.2–2.2)
ALP SERPL-CCNC: 58 IU/L (ref 44–121)
ALT SERPL-CCNC: 15 IU/L (ref 0–44)
AST SERPL-CCNC: 13 IU/L (ref 0–40)
BASOPHILS # BLD AUTO: 0.1 X10E3/UL (ref 0–0.2)
BASOPHILS NFR BLD AUTO: 1 %
BILIRUB SERPL-MCNC: 0.4 MG/DL (ref 0–1.2)
BUN SERPL-MCNC: 18 MG/DL (ref 8–27)
BUN/CREAT SERPL: 19 (ref 10–24)
CALCIUM SERPL-MCNC: 9.3 MG/DL (ref 8.6–10.2)
CHLORIDE SERPL-SCNC: 103 MMOL/L (ref 96–106)
CHOLEST SERPL-MCNC: 152 MG/DL (ref 100–199)
CO2 SERPL-SCNC: 25 MMOL/L (ref 20–29)
CREAT SERPL-MCNC: 0.97 MG/DL (ref 0.76–1.27)
EGFRCR SERPLBLD CKD-EPI 2021: 80 ML/MIN/1.73
EOSINOPHIL # BLD AUTO: 0.3 X10E3/UL (ref 0–0.4)
EOSINOPHIL NFR BLD AUTO: 5 %
ERYTHROCYTE [DISTWIDTH] IN BLOOD BY AUTOMATED COUNT: 13.6 % (ref 11.6–15.4)
GLOBULIN SER CALC-MCNC: 2.2 G/DL (ref 1.5–4.5)
GLUCOSE SERPL-MCNC: 151 MG/DL (ref 70–99)
HBA1C MFR BLD: 9.1 % (ref 4.8–5.6)
HCT VFR BLD AUTO: 45.6 % (ref 37.5–51)
HDLC SERPL-MCNC: 42 MG/DL
HGB BLD-MCNC: 14.9 G/DL (ref 13–17.7)
IMM GRANULOCYTES # BLD AUTO: 0 X10E3/UL (ref 0–0.1)
IMM GRANULOCYTES NFR BLD AUTO: 0 %
LDLC SERPL CALC-MCNC: 90 MG/DL (ref 0–99)
LYMPHOCYTES # BLD AUTO: 3.7 X10E3/UL (ref 0.7–3.1)
LYMPHOCYTES NFR BLD AUTO: 47 %
MCH RBC QN AUTO: 29 PG (ref 26.6–33)
MCHC RBC AUTO-ENTMCNC: 32.7 G/DL (ref 31.5–35.7)
MCV RBC AUTO: 89 FL (ref 79–97)
MONOCYTES # BLD AUTO: 0.6 X10E3/UL (ref 0.1–0.9)
MONOCYTES NFR BLD AUTO: 8 %
NEUTROPHILS # BLD AUTO: 3 X10E3/UL (ref 1.4–7)
NEUTROPHILS NFR BLD AUTO: 39 %
PLATELET # BLD AUTO: 197 X10E3/UL (ref 150–450)
POTASSIUM SERPL-SCNC: 4 MMOL/L (ref 3.5–5.2)
PROT SERPL-MCNC: 6.5 G/DL (ref 6–8.5)
RBC # BLD AUTO: 5.14 X10E6/UL (ref 4.14–5.8)
SODIUM SERPL-SCNC: 138 MMOL/L (ref 134–144)
SPECIMEN STATUS REPORT: NORMAL
TRIGL SERPL-MCNC: 108 MG/DL (ref 0–149)
VLDLC SERPL CALC-MCNC: 20 MG/DL (ref 5–40)
WBC # BLD AUTO: 7.6 X10E3/UL (ref 3.4–10.8)

## 2024-01-27 LAB
ALBUMIN/CREAT UR: 22 MG/G CREAT (ref 0–29)
CREAT UR-MCNC: 136.5 MG/DL
MICROALBUMIN UR-MCNC: 30.4 UG/ML

## 2024-02-01 ENCOUNTER — OFFICE VISIT (OUTPATIENT)
Age: 79
End: 2024-02-01
Payer: MEDICARE

## 2024-02-01 VITALS
SYSTOLIC BLOOD PRESSURE: 145 MMHG | DIASTOLIC BLOOD PRESSURE: 81 MMHG | WEIGHT: 238.2 LBS | HEIGHT: 73 IN | BODY MASS INDEX: 31.57 KG/M2 | OXYGEN SATURATION: 97 % | RESPIRATION RATE: 16 BRPM | HEART RATE: 79 BPM | TEMPERATURE: 98.2 F

## 2024-02-01 DIAGNOSIS — E78.5 HYPERLIPIDEMIA LDL GOAL <100: ICD-10-CM

## 2024-02-01 DIAGNOSIS — I10 ESSENTIAL HYPERTENSION WITH GOAL BLOOD PRESSURE LESS THAN 140/90: ICD-10-CM

## 2024-02-01 DIAGNOSIS — E11.65 TYPE 2 DIABETES MELLITUS WITH HYPERGLYCEMIA, WITHOUT LONG-TERM CURRENT USE OF INSULIN (HCC): ICD-10-CM

## 2024-02-01 DIAGNOSIS — R97.20 ELEVATED PSA: ICD-10-CM

## 2024-02-01 DIAGNOSIS — Z00.00 MEDICARE ANNUAL WELLNESS VISIT, SUBSEQUENT: Primary | ICD-10-CM

## 2024-02-01 PROCEDURE — 99213 OFFICE O/P EST LOW 20 MIN: CPT | Performed by: INTERNAL MEDICINE

## 2024-02-01 PROCEDURE — 3077F SYST BP >= 140 MM HG: CPT | Performed by: INTERNAL MEDICINE

## 2024-02-01 PROCEDURE — G8484 FLU IMMUNIZE NO ADMIN: HCPCS | Performed by: INTERNAL MEDICINE

## 2024-02-01 PROCEDURE — G8417 CALC BMI ABV UP PARAM F/U: HCPCS | Performed by: INTERNAL MEDICINE

## 2024-02-01 PROCEDURE — 3046F HEMOGLOBIN A1C LEVEL >9.0%: CPT | Performed by: INTERNAL MEDICINE

## 2024-02-01 PROCEDURE — 1123F ACP DISCUSS/DSCN MKR DOCD: CPT | Performed by: INTERNAL MEDICINE

## 2024-02-01 PROCEDURE — G8427 DOCREV CUR MEDS BY ELIG CLIN: HCPCS | Performed by: INTERNAL MEDICINE

## 2024-02-01 PROCEDURE — G0439 PPPS, SUBSEQ VISIT: HCPCS | Performed by: INTERNAL MEDICINE

## 2024-02-01 PROCEDURE — 3078F DIAST BP <80 MM HG: CPT | Performed by: INTERNAL MEDICINE

## 2024-02-01 PROCEDURE — 1036F TOBACCO NON-USER: CPT | Performed by: INTERNAL MEDICINE

## 2024-02-01 RX ORDER — DULAGLUTIDE 0.75 MG/.5ML
0.75 INJECTION, SOLUTION SUBCUTANEOUS WEEKLY
Qty: 12 ADJUSTABLE DOSE PRE-FILLED PEN SYRINGE | Refills: 3 | Status: SHIPPED | OUTPATIENT
Start: 2024-02-01

## 2024-02-01 SDOH — HEALTH STABILITY: PHYSICAL HEALTH: ON AVERAGE, HOW MANY MINUTES DO YOU ENGAGE IN EXERCISE AT THIS LEVEL?: 20 MIN

## 2024-02-01 SDOH — ECONOMIC STABILITY: INCOME INSECURITY: IN THE LAST 12 MONTHS, WAS THERE A TIME WHEN YOU WERE NOT ABLE TO PAY THE MORTGAGE OR RENT ON TIME?: NO

## 2024-02-01 SDOH — ECONOMIC STABILITY: FOOD INSECURITY: WITHIN THE PAST 12 MONTHS, YOU WORRIED THAT YOUR FOOD WOULD RUN OUT BEFORE YOU GOT MONEY TO BUY MORE.: NEVER TRUE

## 2024-02-01 SDOH — ECONOMIC STABILITY: INCOME INSECURITY: HOW HARD IS IT FOR YOU TO PAY FOR THE VERY BASICS LIKE FOOD, HOUSING, MEDICAL CARE, AND HEATING?: NOT VERY HARD

## 2024-02-01 SDOH — ECONOMIC STABILITY: HOUSING INSECURITY: IN THE LAST 12 MONTHS, HOW MANY PLACES HAVE YOU LIVED?: 1

## 2024-02-01 SDOH — ECONOMIC STABILITY: FOOD INSECURITY: WITHIN THE PAST 12 MONTHS, THE FOOD YOU BOUGHT JUST DIDN'T LAST AND YOU DIDN'T HAVE MONEY TO GET MORE.: NEVER TRUE

## 2024-02-01 SDOH — HEALTH STABILITY: PHYSICAL HEALTH: ON AVERAGE, HOW MANY DAYS PER WEEK DO YOU ENGAGE IN MODERATE TO STRENUOUS EXERCISE (LIKE A BRISK WALK)?: 4 DAYS

## 2024-02-01 SDOH — ECONOMIC STABILITY: TRANSPORTATION INSECURITY
IN THE PAST 12 MONTHS, HAS THE LACK OF TRANSPORTATION KEPT YOU FROM MEDICAL APPOINTMENTS OR FROM GETTING MEDICATIONS?: NO

## 2024-02-01 ASSESSMENT — SOCIAL DETERMINANTS OF HEALTH (SDOH)
WITHIN THE LAST YEAR, HAVE YOU BEEN KICKED, HIT, SLAPPED, OR OTHERWISE PHYSICALLY HURT BY YOUR PARTNER OR EX-PARTNER?: NO
WITHIN THE LAST YEAR, HAVE YOU BEEN AFRAID OF YOUR PARTNER OR EX-PARTNER?: NO
HOW OFTEN DO YOU ATTEND CHURCH OR RELIGIOUS SERVICES?: MORE THAN 4 TIMES PER YEAR
WITHIN THE LAST YEAR, HAVE YOU BEEN HUMILIATED OR EMOTIONALLY ABUSED IN OTHER WAYS BY YOUR PARTNER OR EX-PARTNER?: NO
DO YOU BELONG TO ANY CLUBS OR ORGANIZATIONS SUCH AS CHURCH GROUPS UNIONS, FRATERNAL OR ATHLETIC GROUPS, OR SCHOOL GROUPS?: NO
IN A TYPICAL WEEK, HOW MANY TIMES DO YOU TALK ON THE PHONE WITH FAMILY, FRIENDS, OR NEIGHBORS?: MORE THAN THREE TIMES A WEEK
WITHIN THE LAST YEAR, HAVE TO BEEN RAPED OR FORCED TO HAVE ANY KIND OF SEXUAL ACTIVITY BY YOUR PARTNER OR EX-PARTNER?: NO
HOW OFTEN DO YOU ATTENT MEETINGS OF THE CLUB OR ORGANIZATION YOU BELONG TO?: NEVER

## 2024-02-01 ASSESSMENT — PATIENT HEALTH QUESTIONNAIRE - PHQ9
SUM OF ALL RESPONSES TO PHQ QUESTIONS 1-9: 0
2. FEELING DOWN, DEPRESSED OR HOPELESS: 0
1. LITTLE INTEREST OR PLEASURE IN DOING THINGS: 0
SUM OF ALL RESPONSES TO PHQ9 QUESTIONS 1 & 2: 0
SUM OF ALL RESPONSES TO PHQ QUESTIONS 1-9: 0

## 2024-02-01 ASSESSMENT — LIFESTYLE VARIABLES
HOW OFTEN DO YOU HAVE A DRINK CONTAINING ALCOHOL: NEVER
HOW MANY STANDARD DRINKS CONTAINING ALCOHOL DO YOU HAVE ON A TYPICAL DAY: PATIENT DOES NOT DRINK

## 2024-02-01 NOTE — PROGRESS NOTES
Medicare Annual Wellness Visit    Nehemias Doran III is here for Medicare AWV    Assessment & Plan   Medicare annual wellness visit, subsequent  Type 2 diabetes mellitus with hyperglycemia, without long-term current use of insulin (HCC)  -     Dulaglutide (TRULICITY) 0.75 MG/0.5ML SOPN; Inject 0.75 mg into the skin once a week, Disp-12 Adjustable Dose Pre-filled Pen Syringe, R-3Normal  -     [facility] - Pharmacist HR Facilities-[facility])  -     Hemoglobin A1C; Future  Essential hypertension with goal blood pressure less than 140/90  -     Comprehensive Metabolic Panel; Future  Hyperlipidemia LDL goal <100  -     Lipid Panel; Future  Elevated PSA  -     PSA, Diagnostic; Future    Recommendations for Preventive Services Due: see orders and patient instructions/AVS.  Recommended screening schedule for the next 5-10 years is provided to the patient in written form: see Patient Instructions/AVS.     Return in about 3 months (around 5/1/2024) for labs 1 week before, Refer to PharmD.     Subjective       Patient's complete Health Risk Assessment and screening values have been reviewed and are found in Flowsheets. The following problems were reviewed today and where indicated follow up appointments were made and/or referrals ordered.    Positive Risk Factor Screenings with Interventions:                Activity, Diet, and Weight:  On average, how many days per week do you engage in moderate to strenuous exercise (like a brisk walk)?: 4 days  On average, how many minutes do you engage in exercise at this level?: 20 min    Do you eat balanced/healthy meals regularly?: Yes    Body mass index is 31.43 kg/m². (!) Abnormal    Obesity Interventions:  low carbohydrate diet                               Objective   Vitals:    02/01/24 0758 02/01/24 0800   BP: (!) 143/79 (!) 145/81   Site: Right Upper Arm Left Upper Arm   Position: Sitting Sitting   Cuff Size: Medium Adult Medium Adult   Pulse: 79 79   Resp: 16

## 2024-02-01 NOTE — PROGRESS NOTES
[facility] - [facility]    Progress Note    Cathi Salomon Patient Status:  Observation    1935 MRN [id number]   Location [facility] 3W/SW Attending Lizeth Magallon, [address] [place] [address] Day # 1 PCP No primary care provider on file.        SUBJ Subjective:       Chief Complaint  The patient presents for follow up of diabetes, hypertension and high cholesterol.        HPI  Nehemias Doran III is a 78 y.o.. male seen for follow up of diabetes.   Healso has hypertension and hyperlipidemia. Diabetes no significant medication side effects noted, uncontrolled, on Glucotrol Xl 10 mg and metformin 1 gm BID, pt advised to consider GLP-1 since he continues to struggle with weight and diet and A1c continues to rise.  Patient also will be referred to Pharm.D. to help with management.  Hypertension uncontrolled, no significant medication side effects noted, on Dyazide, patient will try to monitor his blood pressure and if not improving at next office visit we will consider ARB,  hyperlipidemia borderline controlled, no significant medication side effects noted, on Lipitor 20 mg.  If blood pressure and cholesterol not improving at next office visit with weight loss will need to adjust medications.    Diet and Lifestyle: generally follows a low fat low cholesterol diet, does not follow a diabetic diet regularly, exercises regularly normally, by walking .  Patient encouraged to add resistance training    Home BP Monitoring: is not measured at home but is encouraged to start monitoring BP.    Diabetic Review of Systems - home glucose monitoring: is not performed.    Other symptoms and concerns: Anxiety Review:  Patient is seen for anxiety disorder. Current treatment includes Ativan and no other therapies.   Ongoing symptoms include: none.   Patient denies: palpitations, racing thoughts.   Reported side effects from the treatment: none.  Patient is mainly using the Ativan to help sleep at night.    Patient has sleep apnea but is not using his CPAP machine due to too much pressure.  Patient encouraged to follow-up with a sleep specialist to have it adjusted.    Patient has multiple cysts on his skin for which he sees dermatology as well as plastic surgery.     Patient's PSA  UNIT/ML injection 10,000 Units 10,000 Units Subcutaneous Once per day on Mon Wed Fri   ipratropium-albuterol (DUONEB) nebulizer solution 3 mL 3 mL Nebulization Q4H PRN   metoprolol Tartrate (LOPRESSOR) tab 12.5 mg 12.5 mg Oral 2x Daily(Beta Blocker)   TraM

## 2024-02-01 NOTE — PROGRESS NOTES
Nehemias SMITH Riddeniz BONILLA presents today for   Chief Complaint   Patient presents with    3 Month Follow-Up                 1. \"Have you been to the ER, urgent care clinic since your last visit?  Hospitalized since your last visit?\" No    2. \"Have you seen or consulted any other health care providers outside of the [facility] since your last visit?\" No     3. For patients aged 45-75: Has the patient had a colonoscopy / FIT/ Cologuard? No      If the patient is female:    4. For patients aged 40-74: Has the patient had a mammogram within the past 2 years? NA - based on age or sex      5. For patients aged 21-65: Has the patient had a pap smear? NA - based on age or sex

## 2024-02-20 NOTE — PROGRESS NOTES
Comment:                 Prediabetes: 5.7 - 6.4           Diabetes: >6.4           Glycemic control for adults with diabetes: <7.0         Screenings/Prevention Parameters:  -Diabetic Eye and Foot Exams:      Diabetes Management   Topic Date Due    Diabetic foot exam  2020         Diabetic Foot Exam HM Status   Topic Date Due    Diabetic Foot Exam  2020      -Microalbumin / Creatinine ratio:       Lab Results   Component Value Date/Time    MICROALBUR 20.6 2022 08:11 AM    LABCREA 136.5 2024 12:00 AM    LABCREA 106.00 07/10/2023 09:12 AM     -ASCVD Risk Score Parameters and Calculation    Race: White (non-)     BP Readings from Last 3 Encounters:   24 (!) 145/81   10/31/23 138/82   23 133/72        Lab Results   Component Value Date    CHOL 152 2024    CHOL 150 10/26/2023    CHOL 140 07/10/2023     Lab Results   Component Value Date    TRIG 108 2024    TRIG 120 10/26/2023    TRIG 120 07/10/2023     Lab Results   Component Value Date    HDL 42 2024    HDL 42 10/26/2023    HDL 48 07/10/2023     Lab Results   Component Value Date    LDLCALC 90 2024    LDLCALC 86 10/26/2023    LDLCALC 68 07/10/2023        Social History     Tobacco Use    Smoking status: Former     Current packs/day: 0.00     Average packs/day: 1 pack/day for 20.0 years (20.0 ttl pk-yrs)     Types: Cigarettes     Start date: 1958     Quit date: 1978     Years since quittin.0     Passive exposure: Never    Smokeless tobacco: Never   Substance Use Topics    Alcohol use: Yes     Alcohol/week: 0.0 standard drinks of alcohol         Calculated ASCVD Risk Score: The 10-year ASCVD risk score (Kerry VAZQUEZ, et al., 2019) is: 59.4%    Values used to calculate the score:      Age: 78 years      Sex: Male      Is Non- : No      Diabetic: Yes      Tobacco smoker: No      Systolic Blood Pressure: 145 mmHg      Is BP treated: Yes      HDL Cholesterol: 42 mg/dL

## 2024-02-21 ENCOUNTER — PHARMACY VISIT (OUTPATIENT)
Age: 79
End: 2024-02-21

## 2024-02-21 DIAGNOSIS — E11.65 TYPE 2 DIABETES MELLITUS WITH HYPERGLYCEMIA, WITHOUT LONG-TERM CURRENT USE OF INSULIN (HCC): Primary | ICD-10-CM

## 2024-02-21 RX ORDER — ATORVASTATIN CALCIUM 40 MG/1
40 TABLET, FILM COATED ORAL NIGHTLY
Qty: 90 TABLET | Refills: 3 | Status: SHIPPED | OUTPATIENT
Start: 2024-02-21

## 2024-02-21 NOTE — PATIENT INSTRUCTIONS
roll-ups with slice of cheese  Sugar Free Jello  Glucerna shake (16 grams)   Glucerna hunger smart shake (16 grams)  Ensure protein max shake  Fruit (1 serving/15 grams)  1/2 banana, rudy, or grapefruit   1/3 melon (small cantaloupe)  1 slice or 1 cup of honeydew melon  1 slice or 1 and 1/4 cups of watermelon   1 small apple, peach, orange or pear  2 small tangerines  1 cup of raspberries  3/4 cup of blackberries, blueberries or pineapples  1/2 cup of fruit juice, pears, applesauce, or mangos  17 small grapes  12 cherries    Be careful with the glucerna products as they differ in the total carbs depending on the product (some are intended as meal replacements not snacks).  Make sure you look at the total carbs on the label as products can differ.     Physical Activity:  - Aim for 30 minutes of consistent, moderately intensive, physical activity a day for 5 days or an average of 150 minutes per week.   - Start slow, increase as tolerated. For example: Walk every day, working up to 30 minutes of brisk walking, 5 days a week--or split the 30 minutes into two 15-minute or three 10-minute walks.    - If you sit for a long time, get up and move/stretch every 90 minutes.    Other recommendations:  - Schedule an annual eye exam.  - Check your feet daily for any signs of open wounds, cuts, or sores.    - Given your risk factors, the following vaccines are recommended: annual flu shot, age-based pneumococcal vaccines (Pneumovax, Prevnar 13).    In addition to taking your medications as directed, improving your blood sugar involves modifying your nutrition and maximizing the amount of physical activity.

## 2024-03-04 RX ORDER — GLIPIZIDE 10 MG/1
TABLET, FILM COATED, EXTENDED RELEASE ORAL
Qty: 90 TABLET | Refills: 3 | Status: SHIPPED | OUTPATIENT
Start: 2024-03-04

## 2024-03-22 RX ORDER — TRIAMTERENE AND HYDROCHLOROTHIAZIDE 37.5; 25 MG/1; MG/1
CAPSULE ORAL
Qty: 90 CAPSULE | Refills: 0 | Status: SHIPPED | OUTPATIENT
Start: 2024-03-22

## 2024-04-10 DIAGNOSIS — F51.01 PRIMARY INSOMNIA: ICD-10-CM

## 2024-04-10 RX ORDER — LORAZEPAM 1 MG/1
TABLET ORAL
Qty: 30 TABLET | Refills: 2 | Status: SHIPPED | OUTPATIENT
Start: 2024-04-10 | End: 2024-07-09

## 2024-05-24 LAB
ALBUMIN SERPL-MCNC: 4.5 G/DL (ref 3.8–4.8)
ALBUMIN/GLOB SERPL: 1.9 {RATIO} (ref 1.2–2.2)
ALP SERPL-CCNC: 55 IU/L (ref 44–121)
ALT SERPL-CCNC: 17 IU/L (ref 0–44)
AST SERPL-CCNC: 16 IU/L (ref 0–40)
BILIRUB SERPL-MCNC: 0.5 MG/DL (ref 0–1.2)
BUN SERPL-MCNC: 18 MG/DL (ref 8–27)
BUN/CREAT SERPL: 18 (ref 10–24)
CALCIUM SERPL-MCNC: 10 MG/DL (ref 8.6–10.2)
CHLORIDE SERPL-SCNC: 100 MMOL/L (ref 96–106)
CHOLEST SERPL-MCNC: 147 MG/DL (ref 100–199)
CO2 SERPL-SCNC: 23 MMOL/L (ref 20–29)
CREAT SERPL-MCNC: 1.01 MG/DL (ref 0.76–1.27)
EGFRCR SERPLBLD CKD-EPI 2021: 77 ML/MIN/1.73
GLOBULIN SER CALC-MCNC: 2.4 G/DL (ref 1.5–4.5)
GLUCOSE SERPL-MCNC: 131 MG/DL (ref 70–99)
HBA1C MFR BLD: 7.6 % (ref 4.8–5.6)
HDLC SERPL-MCNC: 44 MG/DL
LDLC SERPL CALC-MCNC: 84 MG/DL (ref 0–99)
POTASSIUM SERPL-SCNC: 5 MMOL/L (ref 3.5–5.2)
PROT SERPL-MCNC: 6.9 G/DL (ref 6–8.5)
PSA SERPL-MCNC: 4.1 NG/ML (ref 0–4)
SODIUM SERPL-SCNC: 142 MMOL/L (ref 134–144)
SPECIMEN STATUS REPORT: NORMAL
TRIGL SERPL-MCNC: 102 MG/DL (ref 0–149)
VLDLC SERPL CALC-MCNC: 19 MG/DL (ref 5–40)

## 2024-06-18 RX ORDER — TRIAMTERENE AND HYDROCHLOROTHIAZIDE 37.5; 25 MG/1; MG/1
CAPSULE ORAL
Qty: 90 CAPSULE | Refills: 3 | Status: SHIPPED | OUTPATIENT
Start: 2024-06-18

## 2024-07-11 DIAGNOSIS — F51.01 PRIMARY INSOMNIA: ICD-10-CM

## 2024-07-11 RX ORDER — LORAZEPAM 1 MG/1
TABLET ORAL
Qty: 30 TABLET | Refills: 3 | Status: SHIPPED | OUTPATIENT
Start: 2024-07-11 | End: 2024-11-08

## 2024-07-25 NOTE — PROGRESS NOTES
Monitorin and Scheduled Appointment  Intervention Accepted By: Patient/Caregiver: 2  Gap Closed?: No   Time Spent (min): 30

## 2024-07-29 ENCOUNTER — TELEPHONE (OUTPATIENT)
Dept: PHARMACY | Facility: CLINIC | Age: 79
End: 2024-07-29

## 2024-07-29 ENCOUNTER — PHARMACY VISIT (OUTPATIENT)
Facility: CLINIC | Age: 79
End: 2024-07-29

## 2024-07-29 VITALS — WEIGHT: 226.4 LBS | BODY MASS INDEX: 29.87 KG/M2

## 2024-07-29 DIAGNOSIS — E11.9 TYPE 2 DIABETES MELLITUS WITHOUT COMPLICATION, WITHOUT LONG-TERM CURRENT USE OF INSULIN (HCC): Primary | ICD-10-CM

## 2024-07-29 DIAGNOSIS — E11.65 TYPE 2 DIABETES MELLITUS WITH HYPERGLYCEMIA, WITHOUT LONG-TERM CURRENT USE OF INSULIN (HCC): Primary | ICD-10-CM

## 2024-07-29 RX ORDER — LANCETS 30 GAUGE
1 EACH MISCELLANEOUS 3 TIMES DAILY
Qty: 300 EACH | Refills: 3 | Status: SHIPPED | OUTPATIENT
Start: 2024-07-29

## 2024-07-29 NOTE — PATIENT INSTRUCTIONS
Your Visit Summary:     Plan:  - Start checking your blood glucose at least once daily    For any questions, please call [phone] or your PCP office.    Check and document your blood sugar first thing in the morning (fasting at least 8 hours), 2 hours after a meal, and/or before bedtime.   Bring your meter/log to all future visits.     Your blood sugar goals:  - Fasting (first thing in the morning)  blood sugar: 80 - 130mg/dL  - 2 hours after a meal: 80 - 180mg/dL    When you experience symptoms of low blood sugar (example: less than 70):  - Confirm low reading by checking your blood sugar.   - Then treat with 15 grams of carbohydrates (one-half cup of juice or regular soda, or 4-5 glucose tablets).  - Wait 15 minutes to recheck blood sugar.   - Then eat a protein containing meal/snack to prevent another low blood sugar episode. (example: peanut butter + crackers)    Nutrition:  - When reviewing a nutrition label, focus on the serving size, total calories, fat (and type of fats), total carbohydrates, sugar (and amount of added sugar), amount of fiber (good for your digestive), and amount of protein.  Refer to your nutrition label guide for more information.  - For a meal : max 45 - 60 grams of carbohydrates  - For a snack: max 15 grams of carbohydrates  - Reduce amount of saturated and trans fat.  Consider more unsaturated fat options as they are better for your heart health.   - Have at least 1 serving of lean fat protein with each meal.    - Increase fiber intake slowly to prevent constipation.   - Substitute fruit juices for the whole fruit    Low carb snack ideas (15 grams total carb or less):    String cheese or babybel with 6 crackers  4 peanut butter crackers  3 cups of popcorn  1 cup raw vegetables with hummus or ranch dip (just need to watch how much dip you use)  Nuts  2 rice cakes  Celery with peanut butter or cream cheese  String cheese with 1 serving of fruit  Greek yogurt (look at label to make

## 2024-07-29 NOTE — TELEPHONE ENCOUNTER
Will renew the prescriptions as requested.    Thank you,    Hunter Neal, PharmD, BCACP, BC-[facility]    For Pharmacy Admin Tracking Only    Program: Medical Group  CPA in place:  Yes  Recommendation Provided To: Pharmacy: 2  Intervention Detail: New Rx: 2, reason: Needs Additional Therapy  Intervention Accepted By: Pharmacy: 2  Gap Closed?: Yes   Time Spent (min): 10

## 2024-07-29 NOTE — TELEPHONE ENCOUNTER
Contacted patient on Mobile number and left a message advising him that Dr Neal called in his prescriptions to [facility].     [facility]   Ambulatory Pharmacy Clinical   [phone]  Department, toll free: [phone], option 2     For Pharmacy Admin Tracking Only    Program: Medical Group  Gap Closed?: Yes   Time Spent (min): 5

## 2024-07-29 NOTE — TELEPHONE ENCOUNTER
**Incoming Call**    Patient called in to let Dr Neal know that he will need refills on One Touch Ultra Strips and Needles.     Patient uses [facility]  # [phone]    [facility]   Ambulatory Pharmacy Clinical   [phone]  Department, toll free: [phone], option 2     For Pharmacy Admin Tracking Only    Program: Medical Group  Gap Closed?: Yes   Time Spent (min): 10

## 2024-07-30 RX ORDER — CALCIUM CITRATE/VITAMIN D3 200MG-6.25
1 TABLET ORAL DAILY
Qty: 100 EACH | Refills: 3 | Status: SHIPPED | OUTPATIENT
Start: 2024-07-30

## 2024-07-30 RX ORDER — BLOOD-GLUCOSE METER
EACH MISCELLANEOUS
Qty: 1 EACH | Refills: 0 | Status: SHIPPED | OUTPATIENT
Start: 2024-07-30

## 2024-07-30 NOTE — TELEPHONE ENCOUNTER
Preferred meters and test strips include: Roche (e.g., Accu-Check Natividad Plus, Accu-Check Guide, Accu-Chek Guide Me) or [employer] [place] (e.g. TrueMetrix, TrueTrack).

## 2024-08-12 ENCOUNTER — TELEPHONE (OUTPATIENT)
Facility: CLINIC | Age: 79
End: 2024-08-12

## 2024-08-12 NOTE — TELEPHONE ENCOUNTER
Ozempic would not cause fever and chills but he can hold the ozempic until he is feeling better and eating well.

## 2024-08-12 NOTE — TELEPHONE ENCOUNTER
Wife called in to report that over the weekend patient had chills, vomiting, body aches and fever.  Home test was positive for Covid.  [facility]  [facility], but doctor said they were going to treat it as Covid.  Patient feeling a little better today, but still weak and no energy, wife is asking if this could be a side effect of Ozempic and she is asking me to send to Dr. Neal and Dr. Dickens.

## 2024-08-16 ENCOUNTER — TELEMEDICINE (OUTPATIENT)
Facility: CLINIC | Age: 79
End: 2024-08-16
Payer: MEDICARE

## 2024-08-16 DIAGNOSIS — R11.2 NAUSEA AND VOMITING, UNSPECIFIED VOMITING TYPE: ICD-10-CM

## 2024-08-16 DIAGNOSIS — J06.9 VIRAL UPPER RESPIRATORY TRACT INFECTION: ICD-10-CM

## 2024-08-16 DIAGNOSIS — I20.89 ANGINAL EQUIVALENT (HCC): Primary | ICD-10-CM

## 2024-08-16 PROCEDURE — G8417 CALC BMI ABV UP PARAM F/U: HCPCS | Performed by: INTERNAL MEDICINE

## 2024-08-16 PROCEDURE — 99213 OFFICE O/P EST LOW 20 MIN: CPT | Performed by: INTERNAL MEDICINE

## 2024-08-16 PROCEDURE — 1123F ACP DISCUSS/DSCN MKR DOCD: CPT | Performed by: INTERNAL MEDICINE

## 2024-08-16 PROCEDURE — G8427 DOCREV CUR MEDS BY ELIG CLIN: HCPCS | Performed by: INTERNAL MEDICINE

## 2024-08-16 PROCEDURE — 1036F TOBACCO NON-USER: CPT | Performed by: INTERNAL MEDICINE

## 2024-08-16 RX ORDER — ONDANSETRON 4 MG/1
4 TABLET, ORALLY DISINTEGRATING ORAL EVERY 8 HOURS PRN
COMMUNITY
Start: 2024-08-11

## 2024-08-16 ASSESSMENT — ENCOUNTER SYMPTOMS
SHORTNESS OF BREATH: 1
CHEST TIGHTNESS: 0

## 2024-08-16 NOTE — PROGRESS NOTES
Nehemias Doran III presents today for   Chief Complaint   Patient presents with    Shortness of Breath     Patient states he still has an upset stomach but seems to be getting better.     Patient states when he walks to the kitchen from the living room he can feel his heart beating real fast.  Patient states he can be active for about and then he has to sit down.       \"Have you been to the ER, urgent care clinic since your last visit?  Hospitalized since your last visit?\"    Yes, [facility] for possible flu.    “Have you seen or consulted any other health care providers outside of [facility] since your last visit?”    NO

## 2024-08-16 NOTE — PROGRESS NOTES
Nehemias Doran III, was evaluated through a synchronous (real-time) audio-video encounter. The patient (or guardian if applicable) is aware that this is a billable service, which includes applicable co-pays. This Virtual Visit was conducted with patient's (and/or legal guardian's) consent. Patient identification was verified, and a caregiver was present when appropriate.   The patient was located at Home: [address] [place] [id number]  Provider was located at Facility (Appt Dept): [address] [place] [address], [address]  [place] [address]  Confirm you are appropriately licensed, registered, or certified to deliver care in the state where the patient is located as indicated above. If you are not or unsure, please re-schedule the visit: Yes, I confirm.     Nehemias Doran III (:  1945) is an established patient, presenting virtually for evaluation of the following:    Assessment & Plan   Below is the assessment and plan developed based on review of pertinent history, physical exam, labs, studies, and medications.    ICD-10-CM    1. Anginal equivalent (HCC)  I20.89 [facility] - Melany Garrett, , Cardiology, [place] ([facility] [address])      2. Viral upper respiratory tract infection  J06.9 Pt slowly recovering and will continue to keep up hydration       3. Nausea and vomiting, unspecified vomiting type  R11.2 Continue Zofran 4 mg ODT as needed            Return if symptoms worsen or fail to improve.       Subjective   Patient over the last week has had a viral illness with nausea vomiting and increased fatigue.  [facility] and [facility] and he was treated with Zofran and Motrin.  There was a test at home that was temporarily positive for COVID but patient was never treated and subsequent home test for COVID have been negative.  Patient has noticed that with any significant exertion he gets palpitations and his blood pressure goes up.  His wife tells me that before he became ill in

## 2024-09-25 ENCOUNTER — TELEPHONE (OUTPATIENT)
Facility: CLINIC | Age: 79
End: 2024-09-25

## 2024-09-27 LAB
ALBUMIN SERPL-MCNC: 4.5 G/DL (ref 3.8–4.8)
ALP SERPL-CCNC: 56 IU/L (ref 44–121)
ALT SERPL-CCNC: 16 IU/L (ref 0–44)
AST SERPL-CCNC: 15 IU/L (ref 0–40)
BILIRUB SERPL-MCNC: 0.6 MG/DL (ref 0–1.2)
BUN SERPL-MCNC: 17 MG/DL (ref 8–27)
BUN/CREAT SERPL: 17 (ref 10–24)
CALCIUM SERPL-MCNC: 9.8 MG/DL (ref 8.6–10.2)
CHLORIDE SERPL-SCNC: 101 MMOL/L (ref 96–106)
CHOLEST SERPL-MCNC: 147 MG/DL (ref 100–199)
CO2 SERPL-SCNC: 25 MMOL/L (ref 20–29)
CREAT SERPL-MCNC: 1 MG/DL (ref 0.76–1.27)
EGFRCR SERPLBLD CKD-EPI 2021: 77 ML/MIN/1.73
GLOBULIN SER CALC-MCNC: 2.3 G/DL (ref 1.5–4.5)
GLUCOSE SERPL-MCNC: 149 MG/DL (ref 70–99)
HBA1C MFR BLD: 7.5 % (ref 4.8–5.6)
HDLC SERPL-MCNC: 41 MG/DL
LDLC SERPL CALC-MCNC: 83 MG/DL (ref 0–99)
POTASSIUM SERPL-SCNC: 4.5 MMOL/L (ref 3.5–5.2)
PROT SERPL-MCNC: 6.8 G/DL (ref 6–8.5)
SODIUM SERPL-SCNC: 142 MMOL/L (ref 134–144)
SPECIMEN STATUS REPORT: NORMAL
TRIGL SERPL-MCNC: 131 MG/DL (ref 0–149)
VLDLC SERPL CALC-MCNC: 23 MG/DL (ref 5–40)

## 2024-10-03 ENCOUNTER — OFFICE VISIT (OUTPATIENT)
Facility: CLINIC | Age: 79
End: 2024-10-03
Payer: MEDICARE

## 2024-10-03 VITALS
DIASTOLIC BLOOD PRESSURE: 71 MMHG | TEMPERATURE: 98.3 F | SYSTOLIC BLOOD PRESSURE: 137 MMHG | BODY MASS INDEX: 28.63 KG/M2 | HEIGHT: 73 IN | HEART RATE: 74 BPM | RESPIRATION RATE: 20 BRPM | WEIGHT: 216 LBS | OXYGEN SATURATION: 100 %

## 2024-10-03 DIAGNOSIS — L72.0 CYST OF SKIN AND SUBCUTANEOUS TISSUE: ICD-10-CM

## 2024-10-03 DIAGNOSIS — E78.5 HYPERLIPIDEMIA LDL GOAL <100: ICD-10-CM

## 2024-10-03 DIAGNOSIS — R97.20 RISING PSA LEVEL: ICD-10-CM

## 2024-10-03 DIAGNOSIS — E11.65 TYPE 2 DIABETES MELLITUS WITH HYPERGLYCEMIA, WITHOUT LONG-TERM CURRENT USE OF INSULIN (HCC): Primary | ICD-10-CM

## 2024-10-03 DIAGNOSIS — I10 ESSENTIAL HYPERTENSION WITH GOAL BLOOD PRESSURE LESS THAN 140/90: ICD-10-CM

## 2024-10-03 PROCEDURE — 99214 OFFICE O/P EST MOD 30 MIN: CPT | Performed by: INTERNAL MEDICINE

## 2024-10-03 PROCEDURE — 1036F TOBACCO NON-USER: CPT | Performed by: INTERNAL MEDICINE

## 2024-10-03 PROCEDURE — 3078F DIAST BP <80 MM HG: CPT | Performed by: INTERNAL MEDICINE

## 2024-10-03 PROCEDURE — G8417 CALC BMI ABV UP PARAM F/U: HCPCS | Performed by: INTERNAL MEDICINE

## 2024-10-03 PROCEDURE — G8484 FLU IMMUNIZE NO ADMIN: HCPCS | Performed by: INTERNAL MEDICINE

## 2024-10-03 PROCEDURE — 3051F HG A1C>EQUAL 7.0%<8.0%: CPT | Performed by: INTERNAL MEDICINE

## 2024-10-03 PROCEDURE — G8427 DOCREV CUR MEDS BY ELIG CLIN: HCPCS | Performed by: INTERNAL MEDICINE

## 2024-10-03 PROCEDURE — 3075F SYST BP GE 130 - 139MM HG: CPT | Performed by: INTERNAL MEDICINE

## 2024-10-03 PROCEDURE — 1123F ACP DISCUSS/DSCN MKR DOCD: CPT | Performed by: INTERNAL MEDICINE

## 2024-10-03 NOTE — PROGRESS NOTES
Nehemias Doran III presents today for   Chief Complaint   Patient presents with    Diabetes    Hypertension    Cholesterol Problem     4 month follow up            \"Have you been to the ER, urgent care clinic since your last visit?  Hospitalized since your last visit?\"    Yes, [facility] for COVID.    “Have you seen or consulted any other health care providers outside of [facility] since your last visit?”    NO

## 2024-10-03 NOTE — PROGRESS NOTES
Subjective:       Chief Complaint  The patient presents for follow up of diabetes, hypertension and high cholesterol.        HPI  Nehemias Doran III is a 79 y.o.. male seen for follow up of diabetes.   Healso has hypertension and hyperlipidemia. Diabetes no significant medication side effects noted, better controlled, on Glucotrol Xl 10 mg and metformin 1 gm BID and addition of Ozempic 0.5 mg through pharmaceutical program..   Hypertension borderline controlled, no significant medication side effects noted, on Dyazide, blood pressure readings from home are much better controlled with SBP<130 most times..  Hyperlipidemia borderline controlled, no significant medication side effects noted, on Lipitor 20 mg.  Will continue to try and improve further with weight loss    Diet and Lifestyle: generally follows a low fat low cholesterol diet, does not follow a diabetic diet regularly, exercises regularly normally, by walking and playing pickle ball.      Home BP Monitoring: is not measured at home but is encouraged to start monitoring BP.    Diabetic Review of Systems - home glucose monitoring: is not performed.    Other symptoms and concerns: Anxiety Review:  Patient is seen for anxiety disorder. Current treatment includes Ativan and no other therapies.   Ongoing symptoms include: none.   Patient denies: palpitations, racing thoughts.   Reported side effects from the treatment: none.  Patient is mainly using the Ativan to help sleep at night.    Patient has sleep apnea but is not using his CPAP machine due to too much pressure.  Patient encouraged to follow-up with a sleep specialist to have it adjusted.    Patient has multiple cysts on his skin for which he sees dermatology as well as plastic surgery.     Patient's PSA has fluctuated up to 5 but and is now back to 4.1.  Patient was referred to urology but unclear if he followed up.  Will check PSA with next blood test and then reassess    Discussed the patient's BMI with

## 2024-10-24 NOTE — PROGRESS NOTES
Pharmacy Progress Note - Diabetes Management       Assessment / Plan:   Diabetes Management:  Per ADA guidelines, Pt's A1c is not at goal of < 7%.  Pt's FBG values are mostly above goal.  His limited NFBG values are typically wnl.  Suspect that his evening snacks consisting of mainly sweets are the culprit of his poor glycemic control.  Will have him focus on dietary changes to improve his glycemic control at this time.  If there is no significant change to his control, will likely fill out a PAP application to start Farxiga 10mg qam given his UACR is close to reaching above 30.  It will also provide some cardiorenal protection.  Will reassess with staggered SMBG logs at follow up in 6 weeks.     Nutrition/Lifestyle Modifications:  - Educated pt on the importance of moderating carbohydrate intake. Reviewed sources of carbohydrates and method to help determine appropriate portion sizes (e.g., Diabetes Plate Method).  - Advised patient to avoid sugar-sweetened beverages and replace with water or diet/zero sugar option.  - Recommend ~30 minutes consistent, moderately intensive, exercise/day or ~150 minutes/week. Start small, stay consistent, and increase length and types of exercise, as tolerated.       Patient will return to clinic in 6 week(s) for follow up.        S/O: Mr. Nehemias Doran III, a 79 y.o. male referred by Jian Dickens MD,  has a past medical history of Diabetes (HCC), HLD (hyperlipidemia), Hypertension, Obesity, and Sleep apnea.  Pt was seen today for diabetes management.  Patient's last A1c was:   Hemoglobin A1C   Date Value Ref Range Status   09/26/2024 7.5 (H) 4.8 - 5.6 % Final     Comment:                 Prediabetes: 5.7 - 6.4           Diabetes: >6.4           Glycemic control for adults with diabetes: <7.0         Interim update: Pt was last seen by me on 7/29/2024.  Per my prior note: Pt's A1c is not at goal of < 7%.  Unable to assess his glycemic control d/t a lack of SMBG logs.  Advised to

## 2024-10-28 ENCOUNTER — PHARMACY VISIT (OUTPATIENT)
Facility: CLINIC | Age: 79
End: 2024-10-28

## 2024-10-28 DIAGNOSIS — E11.65 TYPE 2 DIABETES MELLITUS WITH HYPERGLYCEMIA, WITHOUT LONG-TERM CURRENT USE OF INSULIN (HCC): Primary | ICD-10-CM

## 2024-10-28 NOTE — PATIENT INSTRUCTIONS
Your Visit Summary:     Plan:  - Cut out the sweets and evening snacks    For any questions, please call [phone] or your PCP office.    Check and document your blood sugar first thing in the morning (fasting at least 8 hours), 2 hours after a meal, and/or before bedtime.   Bring your meter/log to all future visits.     Your blood sugar goals:  - Fasting (first thing in the morning)  blood sugar: 80 - 130mg/dL  - 2 hours after a meal: 80 - 180mg/dL

## 2024-11-07 DIAGNOSIS — F51.01 PRIMARY INSOMNIA: ICD-10-CM

## 2024-11-07 RX ORDER — LORAZEPAM 1 MG/1
1 TABLET ORAL NIGHTLY PRN
Qty: 30 TABLET | Refills: 3 | Status: SHIPPED | OUTPATIENT
Start: 2024-11-07 | End: 2025-03-07

## 2024-11-21 ENCOUNTER — OFFICE VISIT (OUTPATIENT)
Age: 79
End: 2024-11-21
Payer: MEDICARE

## 2024-11-21 VITALS
BODY MASS INDEX: 29.39 KG/M2 | WEIGHT: 217 LBS | HEART RATE: 79 BPM | DIASTOLIC BLOOD PRESSURE: 78 MMHG | SYSTOLIC BLOOD PRESSURE: 122 MMHG | HEIGHT: 72 IN | OXYGEN SATURATION: 94 %

## 2024-11-21 DIAGNOSIS — I45.10 RBBB: ICD-10-CM

## 2024-11-21 DIAGNOSIS — I20.89 ANGINAL EQUIVALENT (HCC): Primary | ICD-10-CM

## 2024-11-21 DIAGNOSIS — I10 PRIMARY HYPERTENSION: ICD-10-CM

## 2024-11-21 DIAGNOSIS — E11.9 TYPE 2 DIABETES MELLITUS WITHOUT COMPLICATION, WITHOUT LONG-TERM CURRENT USE OF INSULIN (HCC): ICD-10-CM

## 2024-11-21 PROCEDURE — G8417 CALC BMI ABV UP PARAM F/U: HCPCS | Performed by: INTERNAL MEDICINE

## 2024-11-21 PROCEDURE — 99204 OFFICE O/P NEW MOD 45 MIN: CPT | Performed by: INTERNAL MEDICINE

## 2024-11-21 PROCEDURE — 3051F HG A1C>EQUAL 7.0%<8.0%: CPT | Performed by: INTERNAL MEDICINE

## 2024-11-21 PROCEDURE — 93000 ELECTROCARDIOGRAM COMPLETE: CPT | Performed by: INTERNAL MEDICINE

## 2024-11-21 PROCEDURE — G8484 FLU IMMUNIZE NO ADMIN: HCPCS | Performed by: INTERNAL MEDICINE

## 2024-11-21 PROCEDURE — 1123F ACP DISCUSS/DSCN MKR DOCD: CPT | Performed by: INTERNAL MEDICINE

## 2024-11-21 PROCEDURE — 3074F SYST BP LT 130 MM HG: CPT | Performed by: INTERNAL MEDICINE

## 2024-11-21 PROCEDURE — 1036F TOBACCO NON-USER: CPT | Performed by: INTERNAL MEDICINE

## 2024-11-21 PROCEDURE — G8427 DOCREV CUR MEDS BY ELIG CLIN: HCPCS | Performed by: INTERNAL MEDICINE

## 2024-11-21 PROCEDURE — 3078F DIAST BP <80 MM HG: CPT | Performed by: INTERNAL MEDICINE

## 2024-11-21 ASSESSMENT — PATIENT HEALTH QUESTIONNAIRE - PHQ9
2. FEELING DOWN, DEPRESSED OR HOPELESS: NOT AT ALL
SUM OF ALL RESPONSES TO PHQ QUESTIONS 1-9: 0
1. LITTLE INTEREST OR PLEASURE IN DOING THINGS: NOT AT ALL
SUM OF ALL RESPONSES TO PHQ QUESTIONS 1-9: 0
SUM OF ALL RESPONSES TO PHQ9 QUESTIONS 1 & 2: 0

## 2024-11-21 NOTE — PROGRESS NOTES
Nehemias Doran III presents today for   Chief Complaint   Patient presents with    New Patient     Est care referred by pcp due anginal        Nehemias SMITH Espinoza BONILLA preferred language for health care discussion is english/other.    Is someone accompanying this pt? yes    Is the patient using any DME equipment during OV? no    Depression Screening:  Depression: Not at risk (11/21/2024)    PHQ-2     PHQ-2 Score: 0        Learning Assessment:  Who is the primary learner? Patient    What is the preferred language for health care of the primary learner? ENGLISH    How does the primary learner prefer to learn new concepts? DEMONSTRATION    Answered By patient    Relationship to Learner SELF           Pt currently taking Anticoagulant therapy? no    Pt currently taking Antiplatelet therapy ? no      Coordination of Care:  1. Have you been to the ER, urgent care clinic since your last visit? Hospitalized since your last visit? no    2. Have you seen or consulted any other health care providers outside of the [facility] since your last visit? Include any pap smears or colon screening. no

## 2024-11-21 NOTE — PROGRESS NOTES
Nehemias Doran III    Anginal equivalent    HPI    Nehemias Doran III is a 79 y.o. very nice gentleman referred for initial cardiac evaluation.    He has no known heart disease. About 6 months ago, he was struggling with his weight, his blood pressure etc- and having some shortness of breath. He ended up getting on ozempic, lost 30 lbs and feels great. He feels whatever concern was there for this consult is no longer a concern.    He says he is active- at [employer], in the yard, walks regularly and plays pickleball. He has no chest pressure, no palpitations, no edema etc. He does still nap, but says usually that's after 4 good hours of working in the morning straight and when he finally sits to watch tv he gets bored and falls asleep.    Past Medical History:   Diagnosis Date    Diabetes (HCC)     HLD (hyperlipidemia)     Hypertension     Obesity     Sleep apnea        Past Surgical History:   Procedure Laterality Date    COLONOSCOPY  4/30/15    adenomatous polyps, 5 years.  Dr. Madiha GÓMEZ      tooth extraction       Current Outpatient Medications   Medication Sig Dispense Refill    LORazepam (ATIVAN) 1 MG tablet Take 1 tablet by mouth nightly as needed for Anxiety for up to 120 days. Max Daily Amount: 1 mg 30 tablet 3    blood glucose test strips (TRUE METRIX BLOOD GLUCOSE TEST) strip 1 each by In Vitro route daily Check blood sugar daily. DX E11.9 100 each 3    Blood Glucose Monitoring Suppl (TRUE METRIX METER) DINESH Check blood sugar   daily. DX E11.9 1 each 0    Lancets MISC 1 each by Does not apply route 3 times daily 300 each 3    triamterene-hydroCHLOROthiazide (DYAZIDE) 37.5-25 MG per capsule Take 1 capsule by mouth once daily 90 capsule 3    Semaglutide,0.25 or 0.5MG/DOS, (OZEMPIC, 0.25 OR 0.5 MG/DOSE,) 2 MG/3ML SOPN Inject 0.5 mg into the skin every 7 days PAP Med 12 mL 3    metFORMIN (GLUCOPHAGE) 500 MG tablet TAKE 2 TABLETS BY MOUTH TWICE DAILY WITH MEALS 360 tablet 3    glipiZIDE (GLUCOTROL XL) 10

## 2024-12-05 NOTE — PROGRESS NOTES
E11.9    Blood Glucose Monitoring Suppl (TRUE METRIX METER) DINESH Check blood sugar   daily. DX E11.9    Lancets MISC 1 each by Does not apply route 3 times daily    triamterene-hydroCHLOROthiazide (DYAZIDE) 37.5-25 MG per capsule Take 1 capsule by mouth once daily    Semaglutide,0.25 or 0.5MG/DOS, (OZEMPIC, 0.25 OR 0.5 MG/DOSE,) 2 MG/3ML SOPN Inject 0.5 mg into the skin every 7 days PAP Med    metFORMIN (GLUCOPHAGE) 500 MG tablet TAKE 2 TABLETS BY MOUTH TWICE DAILY WITH MEALS    glipiZIDE (GLUCOTROL XL) 10 MG extended release tablet Take 1 tablet by mouth once daily    atorvastatin (LIPITOR) 40 MG tablet Take 1 tablet by mouth at bedtime     No current facility-administered medications for this visit.     Allergies:  Allergies   Allergen Reactions    Ace Inhibitors Cough       Blood Glucose Monitoring (BGM) or CGM:            ROS:  Today, Pt endorses:  - Symptoms of Hyperglycemia: none  - Symptoms of Hypoglycemia: none    Lifestyle modification(s):  - as above    Medication Adherence/Access:  - Endorses adherence to current regimen?: Yes    Vitals/Labs:  No results found for: \"HBA1C\"  Hemoglobin A1C   Date Value Ref Range Status   09/26/2024 7.5 (H) 4.8 - 5.6 % Final     Comment:                 Prediabetes: 5.7 - 6.4           Diabetes: >6.4           Glycemic control for adults with diabetes: <7.0     05/23/2024 7.6 (H) 4.8 - 5.6 % Final     Comment:                 Prediabetes: 5.7 - 6.4           Diabetes: >6.4           Glycemic control for adults with diabetes: <7.0     01/25/2024 9.1 (H) 4.8 - 5.6 % Final     Comment:                 Prediabetes: 5.7 - 6.4           Diabetes: >6.4           Glycemic control for adults with diabetes: <7.0         Screenings/Prevention Parameters:  -Diabetic Eye and Foot Exams:      Diabetes Management   Topic Date Due    Diabetic foot exam  07/17/2020    Diabetic retinal exam  03/28/2024     -Microalbumin / Creatinine ratio:       Lab Results   Component Value Date/Time

## 2024-12-09 ENCOUNTER — PHARMACY VISIT (OUTPATIENT)
Facility: CLINIC | Age: 79
End: 2024-12-09

## 2024-12-09 DIAGNOSIS — E11.65 TYPE 2 DIABETES MELLITUS WITH HYPERGLYCEMIA, WITHOUT LONG-TERM CURRENT USE OF INSULIN (HCC): Primary | ICD-10-CM

## 2025-01-29 LAB
ALBUMIN SERPL-MCNC: 4.2 G/DL (ref 3.8–4.8)
ALBUMIN/CREAT UR: 22 MG/G CREAT (ref 0–29)
ALP SERPL-CCNC: 57 IU/L (ref 44–121)
ALT SERPL-CCNC: 13 IU/L (ref 0–44)
AST SERPL-CCNC: 12 IU/L (ref 0–40)
BILIRUB SERPL-MCNC: 0.4 MG/DL (ref 0–1.2)
BUN SERPL-MCNC: 19 MG/DL (ref 8–27)
BUN/CREAT SERPL: 17 (ref 10–24)
CALCIUM SERPL-MCNC: 9.8 MG/DL (ref 8.6–10.2)
CHLORIDE SERPL-SCNC: 100 MMOL/L (ref 96–106)
CHOLEST SERPL-MCNC: 137 MG/DL (ref 100–199)
CO2 SERPL-SCNC: 20 MMOL/L (ref 20–29)
CREAT SERPL-MCNC: 1.09 MG/DL (ref 0.76–1.27)
CREAT UR-MCNC: 118.4 MG/DL
EGFRCR SERPLBLD CKD-EPI 2021: 69 ML/MIN/1.73
GLOBULIN SER CALC-MCNC: 2.4 G/DL (ref 1.5–4.5)
GLUCOSE SERPL-MCNC: 107 MG/DL (ref 70–99)
HBA1C MFR BLD: 7 % (ref 4.8–5.6)
HDLC SERPL-MCNC: 38 MG/DL
LDLC SERPL CALC-MCNC: 77 MG/DL (ref 0–99)
MICROALBUMIN UR-MCNC: 26 UG/ML
POTASSIUM SERPL-SCNC: 4.5 MMOL/L (ref 3.5–5.2)
PROT SERPL-MCNC: 6.6 G/DL (ref 6–8.5)
PSA SERPL-MCNC: 5.1 NG/ML (ref 0–4)
SODIUM SERPL-SCNC: 142 MMOL/L (ref 134–144)
SPECIMEN STATUS REPORT: NORMAL
TRIGL SERPL-MCNC: 119 MG/DL (ref 0–149)
VLDLC SERPL CALC-MCNC: 22 MG/DL (ref 5–40)

## 2025-01-29 NOTE — PROGRESS NOTES
Dispense:  200 each     Refill:  3       Future Appointments   Date Time Provider Department Center   2025  9:20 AM Jian iDckens MD [facility]   2025  3:30 PM Hunter Neal, [facility]       Patient verbalized understanding of the information presented and all of the patient’s questions were answered.  AVS was handed to the patient. Patient advised to call the office with any additional questions or concerns.    Notifications of recommendations will be sent to Jian Dickens MD for review.      Thank you for the consult,  Hunter Neal, PharmD, BCACP, BC-ADM        For Pharmacy Admin Tracking Only    Program: Medical Group  CPA in place:  Yes  Recommendation Provided To: Patient/Caregiver: 4 via In person  Intervention Detail: Adherence Monitorin, Patient Access Assistance/Sample Provided, Refill(s) Provided, and Scheduled Appointment  Intervention Accepted By: Patient/Caregiver: 4  Gap Closed?: No   Time Spent (min): 30

## 2025-02-02 SDOH — ECONOMIC STABILITY: FOOD INSECURITY: WITHIN THE PAST 12 MONTHS, THE FOOD YOU BOUGHT JUST DIDN'T LAST AND YOU DIDN'T HAVE MONEY TO GET MORE.: NEVER TRUE

## 2025-02-02 SDOH — ECONOMIC STABILITY: INCOME INSECURITY: IN THE LAST 12 MONTHS, WAS THERE A TIME WHEN YOU WERE NOT ABLE TO PAY THE MORTGAGE OR RENT ON TIME?: NO

## 2025-02-02 SDOH — ECONOMIC STABILITY: FOOD INSECURITY: WITHIN THE PAST 12 MONTHS, YOU WORRIED THAT YOUR FOOD WOULD RUN OUT BEFORE YOU GOT MONEY TO BUY MORE.: NEVER TRUE

## 2025-02-02 SDOH — HEALTH STABILITY: PHYSICAL HEALTH: ON AVERAGE, HOW MANY DAYS PER WEEK DO YOU ENGAGE IN MODERATE TO STRENUOUS EXERCISE (LIKE A BRISK WALK)?: 4 DAYS

## 2025-02-02 SDOH — HEALTH STABILITY: PHYSICAL HEALTH: ON AVERAGE, HOW MANY MINUTES DO YOU ENGAGE IN EXERCISE AT THIS LEVEL?: 20 MIN

## 2025-02-02 ASSESSMENT — LIFESTYLE VARIABLES
HOW OFTEN DO YOU HAVE A DRINK CONTAINING ALCOHOL: MONTHLY OR LESS
HOW MANY STANDARD DRINKS CONTAINING ALCOHOL DO YOU HAVE ON A TYPICAL DAY: 1
HOW OFTEN DO YOU HAVE SIX OR MORE DRINKS ON ONE OCCASION: 1
HOW MANY STANDARD DRINKS CONTAINING ALCOHOL DO YOU HAVE ON A TYPICAL DAY: 1 OR 2
HOW OFTEN DO YOU HAVE A DRINK CONTAINING ALCOHOL: 2

## 2025-02-02 ASSESSMENT — PATIENT HEALTH QUESTIONNAIRE - PHQ9
SUM OF ALL RESPONSES TO PHQ QUESTIONS 1-9: 0
2. FEELING DOWN, DEPRESSED OR HOPELESS: NOT AT ALL
SUM OF ALL RESPONSES TO PHQ QUESTIONS 1-9: 0
SUM OF ALL RESPONSES TO PHQ9 QUESTIONS 1 & 2: 0
1. LITTLE INTEREST OR PLEASURE IN DOING THINGS: NOT AT ALL
SUM OF ALL RESPONSES TO PHQ QUESTIONS 1-9: 0
SUM OF ALL RESPONSES TO PHQ QUESTIONS 1-9: 0

## 2025-02-03 ENCOUNTER — TELEPHONE (OUTPATIENT)
Facility: CLINIC | Age: 80
End: 2025-02-03

## 2025-02-03 ENCOUNTER — PHARMACY VISIT (OUTPATIENT)
Facility: CLINIC | Age: 80
End: 2025-02-03

## 2025-02-03 DIAGNOSIS — E11.9 TYPE 2 DIABETES MELLITUS WITHOUT COMPLICATION, WITHOUT LONG-TERM CURRENT USE OF INSULIN (HCC): ICD-10-CM

## 2025-02-03 DIAGNOSIS — E11.65 TYPE 2 DIABETES MELLITUS WITH HYPERGLYCEMIA, WITHOUT LONG-TERM CURRENT USE OF INSULIN (HCC): Primary | ICD-10-CM

## 2025-02-03 RX ORDER — LANCETS 30 GAUGE
1 EACH MISCELLANEOUS 2 TIMES DAILY
Qty: 200 EACH | Refills: 3 | Status: SHIPPED | OUTPATIENT
Start: 2025-02-03 | End: 2025-02-07 | Stop reason: SDUPTHER

## 2025-02-03 RX ORDER — CALCIUM CITRATE/VITAMIN D3 200MG-6.25
1 TABLET ORAL 2 TIMES DAILY
Qty: 200 EACH | Refills: 3 | Status: SHIPPED | OUTPATIENT
Start: 2025-02-03

## 2025-02-03 NOTE — TELEPHONE ENCOUNTER
Lvm- pt needs to contact his ins. To see which test strips are on their formulary , and call us back with the name

## 2025-02-03 NOTE — TELEPHONE ENCOUNTER
Pt said his pharmacy told him he has to pay   139.00 for his blood glucose test strips   Pt normally does not have to pay anything for them . Please advise   [phone]

## 2025-02-04 ENCOUNTER — OFFICE VISIT (OUTPATIENT)
Facility: CLINIC | Age: 80
End: 2025-02-04

## 2025-02-04 VITALS
TEMPERATURE: 98.7 F | HEART RATE: 77 BPM | DIASTOLIC BLOOD PRESSURE: 72 MMHG | RESPIRATION RATE: 18 BRPM | WEIGHT: 216 LBS | BODY MASS INDEX: 29.26 KG/M2 | SYSTOLIC BLOOD PRESSURE: 133 MMHG | HEIGHT: 72 IN | OXYGEN SATURATION: 100 %

## 2025-02-04 DIAGNOSIS — E78.5 HYPERLIPIDEMIA LDL GOAL <100: ICD-10-CM

## 2025-02-04 DIAGNOSIS — E11.9 TYPE 2 DIABETES MELLITUS WITHOUT COMPLICATION, WITHOUT LONG-TERM CURRENT USE OF INSULIN (HCC): ICD-10-CM

## 2025-02-04 DIAGNOSIS — R97.20 RISING PSA LEVEL: ICD-10-CM

## 2025-02-04 DIAGNOSIS — I10 ESSENTIAL HYPERTENSION WITH GOAL BLOOD PRESSURE LESS THAN 140/90: ICD-10-CM

## 2025-02-04 DIAGNOSIS — Z00.00 MEDICARE ANNUAL WELLNESS VISIT, SUBSEQUENT: Primary | ICD-10-CM

## 2025-02-04 RX ORDER — BLOOD-GLUCOSE METER
EACH MISCELLANEOUS
Qty: 1 KIT | Refills: 0 | Status: SHIPPED | OUTPATIENT
Start: 2025-02-04

## 2025-02-04 NOTE — TELEPHONE ENCOUNTER
Spoke with pharmacist she states insurance will cover one touch verio and she will need a diagnosis code.

## 2025-02-04 NOTE — PROGRESS NOTES
Subjective:       Chief Complaint  The patient presents for follow up of diabetes, hypertension and high cholesterol.        HPI  Nehemias Doran III is a 79 y.o.. male seen for follow up of diabetes.   Healso has hypertension and hyperlipidemia. Diabetes no significant medication side effects noted, better controlled, on Glucotrol Xl 10 mg and metformin 1 gm BID and addition of Ozempic 0.5 mg through pharmaceutical program..   Hypertension well  controlled, no significant medication side effects noted, on Dyazide, blood pressure readings from home are much better controlled with SBP<130 most times..  Hyperlipidemia fairly well controlled, no significant medication side effects noted, on Lipitor 20 mg.  Will continue to try and improve further with weight loss    Diet and Lifestyle: generally follows a low fat low cholesterol diet, does not follow a diabetic diet regularly, exercises regularly normally, by walking and playing pickle ball.      Home BP Monitoring: is not measured at home but is encouraged to start monitoring BP.    Diabetic Review of Systems - home glucose monitoring: is not performed.    Other symptoms and concerns: Anxiety Review:  Patient is seen for anxiety disorder. Current treatment includes Ativan and no other therapies.   Ongoing symptoms include: none.   Patient denies: palpitations, racing thoughts.   Reported side effects from the treatment: none.  Patient is mainly using the Ativan to help sleep at night.    Patient has sleep apnea but is not using his CPAP machine due to too much pressure.  Patient encouraged to follow-up with a sleep specialist to have it adjusted.    Patient has multiple cysts on his skin for which he sees dermatology as well as plastic surgery.     Patient's PSA has fluctuated up to 5 but and is now back to 4.1.  Patient was referred to urology who feels due to his age no further f/u is needed.     Discussed the patient's BMI with him.  The BMI follow up plan is as

## 2025-02-04 NOTE — PROGRESS NOTES
Medicare Annual Wellness Visit    Nehemias Doran III is here for Hypertension, Cholesterol Problem (4 month follow up ), and Medicare AWV    Assessment & Plan   Medicare annual wellness visit, subsequent  Type 2 diabetes mellitus without complication, without long-term current use of insulin (HCC)  -     Hemoglobin A1C; Future  Essential hypertension with goal blood pressure less than 140/90  -     Comprehensive Metabolic Panel; Future  Hyperlipidemia LDL goal <100  -     Lipid Panel; Future  Rising PSA level     Return in about 6 months (around 8/4/2025) for labs 1 week before.     Subjective       Patient's complete Health Risk Assessment and screening values have been reviewed and are found in Flowsheets. The following problems were reviewed today and where indicated follow up appointments were made and/or referrals ordered.                    Hearing Screen:  Do you or your family notice any trouble with your hearing that hasn't been managed with hearing aids?: (!) Yes    Interventions:  Patient declines any further evaluation or treatment               Objective   Vitals:    02/04/25 0903   BP: 133/72   Site: Left Upper Arm   Position: Sitting   Cuff Size: Large Adult   Pulse: 77   Resp: 18   Temp: 98.7 °F (37.1 °C)   TempSrc: Temporal   SpO2: 100%   Weight: 98 kg (216 lb)   Height: 1.829 m (6')      Body mass index is 29.29 kg/m².                    Allergies   Allergen Reactions    Ace Inhibitors Cough     Prior to Visit Medications    Medication Sig Taking? Authorizing Provider   blood glucose test strips (TRUE METRIX BLOOD GLUCOSE TEST) strip 1 each by In Vitro route 2 times daily Check blood sugar daily. DX E11.9 Yes Jian Dickens MD   Lancets MISC 1 each by Does not apply route 2 times daily Yes Jian Dickens MD   LORazepam (ATIVAN) 1 MG tablet Take 1 tablet by mouth nightly as needed for Anxiety for up to 120 days. Max Daily Amount: 1 mg Yes Jian Dickens MD   Blood Glucose Monitoring Suppl (TRUE

## 2025-02-04 NOTE — TELEPHONE ENCOUNTER
Pt states they will cover \"one touch\"  For test strips and  needles    Please send to   [address] - [address] [facility] - P [phone] - F [phone] [[id number]]

## 2025-02-04 NOTE — PROGRESS NOTES
Nehemias Doran III presents today for   Chief Complaint   Patient presents with    Hypertension    Cholesterol Problem     4 month follow up     Medicare AWV           \"Have you been to the ER, urgent care clinic since your last visit?  Hospitalized since your last visit?\"    NO    “Have you seen or consulted any other health care providers outside of [facility] since your last visit?”    NO

## 2025-02-07 ENCOUNTER — TELEPHONE (OUTPATIENT)
Facility: CLINIC | Age: 80
End: 2025-02-07

## 2025-02-07 DIAGNOSIS — E11.9 TYPE 2 DIABETES MELLITUS WITHOUT COMPLICATION, WITHOUT LONG-TERM CURRENT USE OF INSULIN (HCC): Primary | ICD-10-CM

## 2025-02-07 RX ORDER — LANCETS 30 GAUGE
1 EACH MISCELLANEOUS DAILY
Qty: 100 EACH | Refills: 5 | Status: SHIPPED | OUTPATIENT
Start: 2025-02-07

## 2025-02-07 NOTE — TELEPHONE ENCOUNTER
Patient contacted the office asking Dr Dickens to call in an rx for Lancets. I made her aware that it was called in 02.03.2025 but she said that was for the device that was not covered and they need lancets for the one touch. Please advise

## 2025-02-11 ENCOUNTER — SCHEDULED TELEPHONE ENCOUNTER (OUTPATIENT)
Facility: CLINIC | Age: 80
End: 2025-02-11

## 2025-02-11 DIAGNOSIS — E11.9 TYPE 2 DIABETES MELLITUS WITHOUT COMPLICATION, WITHOUT LONG-TERM CURRENT USE OF INSULIN (HCC): Primary | ICD-10-CM

## 2025-02-11 NOTE — PROGRESS NOTES
Pharmacy Progress Note - Medication Access     S/O: Mr. Nehemias Doran III is a 79 y.o. referred by Jian Dickens MD for Medication Access Assistance.      PAP Program:  David Shirley (Ozempic)    - Unable to complete electronic application while in the office on 2/3/25.  Virtual scheduled to complete with his access to email.    Today:  Pt contacted virtually to perform electronic application submission for Ozempic while he has access to his email.    Unfortunately, the [employer] website did not recognize [employer] as Medicare insurance.  Advised pt that a paper application will need to be used.    [employer] result: According to the information we just verified, this is a type of insurance that would disqualify the patient from this program. If the type of insurance coverage has recently changed or you believe this is incorrect, please call [employer] for assistance at [phone].     Past Medical History:   Diagnosis Date    Diabetes (HCC)     HLD (hyperlipidemia)     Hypertension     Obesity     Sleep apnea      Allergies   Allergen Reactions    Ace Inhibitors Cough     Current Outpatient Medications   Medication Sig    Lancets MISC 1 each by Does not apply route daily One Touch Lancets. Check blood sugar 2 times daily Check blood sugar daily. DX E11.9.    blood glucose test strips (ASCENSIA AUTODISC VI;ONE TOUCH ULTRA TEST VI) strip Check blood sugar 2 times daily Check blood sugar daily. DX E11.9    Blood Glucose Monitoring Suppl (ONETOUCH VERIO) w/Device KIT Check blood sugar 2 times daily Check blood sugar daily. DX E11.9.    blood glucose test strips (TRUE METRIX BLOOD GLUCOSE TEST) strip 1 each by In Vitro route 2 times daily Check blood sugar daily. DX E11.9    LORazepam (ATIVAN) 1 MG tablet Take 1 tablet by mouth nightly as needed for Anxiety for up to 120 days. Max Daily Amount: 1 mg    Blood Glucose Monitoring Suppl (TRUE METRIX METER) DINESH Check blood sugar   daily. DX E11.9

## 2025-02-12 ENCOUNTER — TELEPHONE (OUTPATIENT)
Facility: CLINIC | Age: 80
End: 2025-02-12

## 2025-02-12 RX ORDER — ATORVASTATIN CALCIUM 40 MG/1
40 TABLET, FILM COATED ORAL NIGHTLY
Qty: 90 TABLET | Refills: 3 | Status: SHIPPED | OUTPATIENT
Start: 2025-02-12

## 2025-02-12 NOTE — TELEPHONE ENCOUNTER
Pt came in office saying that pcp asked him to come to the office to sign the form where he can continue to receive assistance with Ozempic. Advised pt that no form was left for him to sign and pcp had left for the day. Pt would like a follow up call when form is ready for him to sign.

## 2025-02-13 NOTE — TELEPHONE ENCOUNTER
Pt was in the office w/his wife for an appt and he was given the previous message by Dr. Neal. Pt verbalized understanding.

## 2025-02-19 ENCOUNTER — PATIENT MESSAGE (OUTPATIENT)
Facility: CLINIC | Age: 80
End: 2025-02-19

## 2025-02-19 RX ORDER — MECLIZINE HYDROCHLORIDE 25 MG/1
25 TABLET ORAL 3 TIMES DAILY PRN
Qty: 30 TABLET | Refills: 0 | Status: SHIPPED | OUTPATIENT
Start: 2025-02-19 | End: 2025-03-01

## 2025-02-20 RX ORDER — GLIPIZIDE 10 MG/1
TABLET, FILM COATED, EXTENDED RELEASE ORAL
Qty: 90 TABLET | Refills: 3 | Status: SHIPPED | OUTPATIENT
Start: 2025-02-20

## 2025-03-05 DIAGNOSIS — F51.01 PRIMARY INSOMNIA: ICD-10-CM

## 2025-03-05 RX ORDER — LORAZEPAM 1 MG/1
TABLET ORAL
Qty: 30 TABLET | Refills: 3 | Status: SHIPPED | OUTPATIENT
Start: 2025-03-05 | End: 2025-07-03

## 2025-04-14 NOTE — PROGRESS NOTES
Pharmacy Progress Note - Diabetes Management       Assessment / Plan:   Diabetes Management:  Per ADA guidelines, Pt's A1c is not at goal of < 7%.  All of his SMBG logs are at goal at this time.  Will attempt to get a voucher for pt to not have a gap in therapy with his Ozempic until the shipment arrives at the clinic.  Will maintain his current tx and will reassess with SMBG logs and A1c in 4 months.     Nutrition/Lifestyle Modifications:  - Educated pt on the importance of moderating carbohydrate intake. Reviewed sources of carbohydrates and method to help determine appropriate portion sizes (e.g., Diabetes Plate Method).  - Advised patient to avoid sugar-sweetened beverages and replace with water or diet/zero sugar option.  - Recommend ~30 minutes consistent, moderately intensive, exercise/day or ~150 minutes/week. Start small, stay consistent, and increase length and types of exercise, as tolerated.       Patient will return to clinic in 4 month(s) for follow up.        S/O: Mr. Nehemias Doran III, a 79 y.o. male referred by Jian Dickens MD,  has a past medical history of Diabetes (HCC), HLD (hyperlipidemia), Hypertension, Obesity, and Sleep apnea.  Pt was seen today for diabetes management.  Patient's last A1c was:   Hemoglobin A1C   Date Value Ref Range Status   01/28/2025 7.0 (H) 4.8 - 5.6 % Final     Comment:                 Prediabetes: 5.7 - 6.4           Diabetes: >6.4           Glycemic control for adults with diabetes: <7.0         Interim update: Pt was last seen by me in the office on 2/03/2025.  Per my prior note: Pt's A1c is not at goal of < 7%, but is right at 7% with a decrease of 0.5% from his prior check.  His more recent SMBG logs are further improved from the prior visit.  Suspect that maintaining his current diet and medications will result in a controlled A1c in 3 months.  Will schedule a short follow up to perform the PAP which he was unable to complete during the visit.  Will reassess

## 2025-04-15 ENCOUNTER — CLINICAL DOCUMENTATION (OUTPATIENT)
Facility: CLINIC | Age: 80
End: 2025-04-15

## 2025-04-15 ENCOUNTER — TELEPHONE (OUTPATIENT)
Facility: CLINIC | Age: 80
End: 2025-04-15

## 2025-04-15 ENCOUNTER — PHARMACY VISIT (OUTPATIENT)
Facility: CLINIC | Age: 80
End: 2025-04-15

## 2025-04-15 DIAGNOSIS — E11.9 TYPE 2 DIABETES MELLITUS WITHOUT COMPLICATION, WITHOUT LONG-TERM CURRENT USE OF INSULIN: Primary | ICD-10-CM

## 2025-04-15 NOTE — TELEPHONE ENCOUNTER
Contacted [employer] and spoke with Agent,    Agent stated that the patients application is  as of 24 and has not received the re enrollment application that was electronically sent today, 4/15/25 yet. She advised that I wait a day or two then call back. Once the application is received it can take 1-2 business days to process.    Ania Cook [facility]   Ambulatory Pharmacy Technician Clinical   [phone]  Department, toll free: [phone], option 2     For Pharmacy Admin Tracking Only    Program: Medical Group  Gap Closed?: Yes   Time Spent (min): 10

## 2025-04-15 NOTE — PROGRESS NOTES
PAP for Ozempic performed and approved today.  His last injection from his current supply will be used up on Sunday.  He has [employer] commercial insurance so the electronic method to submit the application wasn't working when I tried back in February.  Please reach out to [employer] to attempt to obtain a voucher to receive a bridge pen.    Thank you,    Hunter Neal, PharmD, BCACP, BC-ADM    For Pharmacy Admin Tracking Only    Program: Medical Group  CPA in place:  Yes  Time Spent (min): 5

## 2025-04-16 NOTE — TELEPHONE ENCOUNTER
Contacted [employer] and spoke with Agent,    Patients application was approved as of 4/16/25 and a shipment has been processed, medication will arrive at the office in 10-14 business days.    Patient Id# [id number]    I requested a voucher for the patient but [employer] is no longer offering delayed shipping vouchers to patients as of 4/15/25, all shipping orders should now be arriving on time with no delays.     Ania Cook [facility]   Ambulatory Pharmacy Technician Clinical   [phone]  Department, toll free: [phone], option 2     For Pharmacy Admin Tracking Only    Program: Medical Group  Gap Closed?: Yes   Time Spent (min): 15

## 2025-04-16 NOTE — TELEPHONE ENCOUNTER
Hunter Neal, [facility]  Ania Gillis  Caller: Unspecified (Yesterday,  1:22 PM)  Thank you for the update.  Can you please call the pt and let him know that he should receive his shipment hopefully prior to his injection is due next Sunday?    Attempted to call patient at the mobile number-unable to LVM-Mailbox Full. Attempted the home number listed and spoke with patients Wife, Mary and relayed the message.     Ania Gillis   [facility]   Ambulatory Pharmacy Technician Clinical   [phone]  Department, toll free: [phone], option 2    For Pharmacy Admin Tracking Only    Program: Medical Group  Gap Closed?: Yes   Time Spent (min): 5

## 2025-04-23 ENCOUNTER — TELEPHONE (OUTPATIENT)
Age: 80
End: 2025-04-23

## 2025-04-23 NOTE — TELEPHONE ENCOUNTER
The patient received a letter that supplies his ozempic that says his refill has not been approved due to missing documentation.    Please advise.

## 2025-04-30 ENCOUNTER — TELEPHONE (OUTPATIENT)
Facility: CLINIC | Age: 80
End: 2025-04-30

## 2025-06-02 RX ORDER — TRIAMTERENE AND HYDROCHLOROTHIAZIDE 37.5; 25 MG/1; MG/1
1 CAPSULE ORAL DAILY
Qty: 90 CAPSULE | Refills: 3 | Status: SHIPPED | OUTPATIENT
Start: 2025-06-02

## 2025-07-07 DIAGNOSIS — F51.01 PRIMARY INSOMNIA: ICD-10-CM

## 2025-07-07 RX ORDER — LORAZEPAM 1 MG/1
TABLET ORAL
Qty: 30 TABLET | Refills: 3 | Status: SHIPPED | OUTPATIENT
Start: 2025-07-07 | End: 2025-11-04

## 2025-07-11 ENCOUNTER — OFFICE VISIT (OUTPATIENT)
Facility: CLINIC | Age: 80
End: 2025-07-11
Payer: MEDICARE

## 2025-07-11 VITALS
RESPIRATION RATE: 20 BRPM | DIASTOLIC BLOOD PRESSURE: 66 MMHG | WEIGHT: 216 LBS | TEMPERATURE: 98 F | HEART RATE: 68 BPM | SYSTOLIC BLOOD PRESSURE: 133 MMHG | BODY MASS INDEX: 29.26 KG/M2 | HEIGHT: 72 IN | OXYGEN SATURATION: 98 %

## 2025-07-11 DIAGNOSIS — I10 ESSENTIAL HYPERTENSION WITH GOAL BLOOD PRESSURE LESS THAN 140/90: ICD-10-CM

## 2025-07-11 DIAGNOSIS — H25.011 CORTICAL AGE-RELATED CATARACT OF RIGHT EYE: ICD-10-CM

## 2025-07-11 DIAGNOSIS — E11.9 TYPE 2 DIABETES MELLITUS WITHOUT COMPLICATION, WITHOUT LONG-TERM CURRENT USE OF INSULIN (HCC): Primary | ICD-10-CM

## 2025-07-11 DIAGNOSIS — E78.5 HYPERLIPIDEMIA LDL GOAL <100: ICD-10-CM

## 2025-07-11 PROCEDURE — 3075F SYST BP GE 130 - 139MM HG: CPT | Performed by: INTERNAL MEDICINE

## 2025-07-11 PROCEDURE — 1123F ACP DISCUSS/DSCN MKR DOCD: CPT | Performed by: INTERNAL MEDICINE

## 2025-07-11 PROCEDURE — 99214 OFFICE O/P EST MOD 30 MIN: CPT | Performed by: INTERNAL MEDICINE

## 2025-07-11 PROCEDURE — 3078F DIAST BP <80 MM HG: CPT | Performed by: INTERNAL MEDICINE

## 2025-07-11 PROCEDURE — 3051F HG A1C>EQUAL 7.0%<8.0%: CPT | Performed by: INTERNAL MEDICINE

## 2025-07-11 NOTE — PROGRESS NOTES
Nehemias oDran III presents today for   Chief Complaint   Patient presents with    Pre-op Exam     Dr. Conn  Cataract Surgery right eye  7/23/2025 and 9/3/2025  [facility]             \"Have you been to the ER, urgent care clinic since your last visit?  Hospitalized since your last visit?\"    NO    “Have you seen or consulted any other health care providers outside of [facility] since your last visit?”    Yes, Dr. Conn Opth.            
EKG FINDINGS - RBBB  2. Labs: HgBA1c:    Lab Results   Component Value Date/Time    LABA1C 7.0 01/28/2025 12:00 AM       IMPRESSION:         ICD-10-CM    1. Type 2 diabetes mellitus without complication, without long-term current use of insulin (HCC)  E11.9 Well-controlled on Glucotrol, metformin and Ozempic 0.5 mg.  Patient to hold the Ozempic for 1 week before surgery and is to hold metformin and glipizide the day of surgery.      2. Essential hypertension with goal blood pressure less than 140/90  I10 Well-controlled on his Dyazide.  Patient to take the medication a.m. of surgery with sip of water      3. Hyperlipidemia LDL goal <100  E78.5 Well-controlled Lipitor 40 mg daily.      4. Cortical age-related cataract of right eye  H25.011 Patient low risk for eye surgery    And is medically cleared to proceed.         LESTER RIBEIRO MD   7/11/2025

## 2025-07-28 NOTE — PROGRESS NOTES
Pharmacy Progress Note - Diabetes Management       Assessment / Plan:   Diabetes Management:  Per ADA guidelines, Pt's A1c is not at goal of < 7%.  Pt's FBG values have been on the ULN or above the majority of the time.  His limited NFBG values are typically lower at goal.  His A1c was likely skewed higher d/t his more recent lapse in Ozempic tx, poor diet, and prednisone use.  He would benefit from a dose increase of Ozempic, but he denies d/t his already depressed appetite.  Pt on glipizide ER 10mg daily with suboptimal glycemic control. Switching to glimepiride for increased potency and longer duration of action. Pt has low hypoglycemia risk and will be monitored.  Will reassess with SMBG logs at follow up in 5 weeks.     Nutrition/Lifestyle Modifications:  - Educated pt on the importance of moderating carbohydrate intake. Reviewed sources of carbohydrates and method to help determine appropriate portion sizes (e.g., Diabetes Plate Method).  - Advised patient to avoid sugar-sweetened beverages and replace with water or diet/zero sugar option.  - Recommend ~30 minutes consistent, moderately intensive, exercise/day or ~150 minutes/week. Start small, stay consistent, and increase length and types of exercise, as tolerated.       Patient will return to clinic in 5 week(s) for follow up.        S/O: Mr. Nehemias Doran III, a 79 y.o. male referred by Jian Dickens MD,  has a past medical history of Diabetes (HCC), HLD (hyperlipidemia), Hypertension, Obesity, and Sleep apnea.  Pt was seen today for diabetes management.  Patient's last A1c was:   Hemoglobin A1C   Date Value Ref Range Status   07/28/2025 7.3 (H) 4.8 - 5.6 % Final     Comment:                 Prediabetes: 5.7 - 6.4           Diabetes: >6.4           Glycemic control for adults with diabetes: <7.0          Interim update: Pt was last seen by me on 4/15/2025.  Per my prior note: Pt's A1c is not at goal of < 7%.  All of his SMBG logs are at goal at this

## 2025-07-29 ENCOUNTER — PHARMACY VISIT (OUTPATIENT)
Facility: CLINIC | Age: 80
End: 2025-07-29

## 2025-07-29 DIAGNOSIS — E11.9 TYPE 2 DIABETES MELLITUS WITHOUT COMPLICATION, WITHOUT LONG-TERM CURRENT USE OF INSULIN (HCC): Primary | ICD-10-CM

## 2025-07-29 LAB
ALBUMIN SERPL-MCNC: 4.1 G/DL (ref 3.8–4.8)
ALP SERPL-CCNC: 54 IU/L (ref 44–121)
ALT SERPL-CCNC: 17 IU/L (ref 0–44)
AST SERPL-CCNC: 11 IU/L (ref 0–40)
BILIRUB SERPL-MCNC: 0.5 MG/DL (ref 0–1.2)
BUN SERPL-MCNC: 23 MG/DL (ref 8–27)
BUN/CREAT SERPL: 23 (ref 10–24)
CALCIUM SERPL-MCNC: 9.5 MG/DL (ref 8.6–10.2)
CHLORIDE SERPL-SCNC: 103 MMOL/L (ref 96–106)
CHOLEST SERPL-MCNC: 134 MG/DL (ref 100–199)
CO2 SERPL-SCNC: 22 MMOL/L (ref 20–29)
CREAT SERPL-MCNC: 0.99 MG/DL (ref 0.76–1.27)
EGFRCR SERPLBLD CKD-EPI 2021: 78 ML/MIN/1.73
EST. AVERAGE GLUCOSE BLD GHB EST-MCNC: 163 MG/DL
GLOBULIN SER CALC-MCNC: 2.3 G/DL (ref 1.5–4.5)
GLUCOSE SERPL-MCNC: 159 MG/DL (ref 70–99)
HBA1C MFR BLD: 7.3 % (ref 4.8–5.6)
HDLC SERPL-MCNC: 58 MG/DL
LDLC SERPL CALC-MCNC: 53 MG/DL (ref 0–99)
POTASSIUM SERPL-SCNC: 4.2 MMOL/L (ref 3.5–5.2)
PROT SERPL-MCNC: 6.4 G/DL (ref 6–8.5)
SODIUM SERPL-SCNC: 142 MMOL/L (ref 134–144)
TRIGL SERPL-MCNC: 130 MG/DL (ref 0–149)
VLDLC SERPL CALC-MCNC: 23 MG/DL (ref 5–40)

## 2025-07-29 RX ORDER — GLIMEPIRIDE 2 MG/1
2 TABLET ORAL EVERY MORNING
Qty: 90 TABLET | Refills: 3 | Status: SHIPPED | OUTPATIENT
Start: 2025-07-29

## 2025-08-04 ENCOUNTER — OFFICE VISIT (OUTPATIENT)
Facility: CLINIC | Age: 80
End: 2025-08-04

## 2025-08-04 VITALS
BODY MASS INDEX: 28.71 KG/M2 | HEART RATE: 76 BPM | WEIGHT: 212 LBS | SYSTOLIC BLOOD PRESSURE: 147 MMHG | HEIGHT: 72 IN | RESPIRATION RATE: 18 BRPM | OXYGEN SATURATION: 98 % | DIASTOLIC BLOOD PRESSURE: 69 MMHG | TEMPERATURE: 97.9 F

## 2025-08-04 DIAGNOSIS — E11.9 TYPE 2 DIABETES MELLITUS WITHOUT COMPLICATION, WITHOUT LONG-TERM CURRENT USE OF INSULIN (HCC): ICD-10-CM

## 2025-08-04 DIAGNOSIS — I10 ESSENTIAL HYPERTENSION WITH GOAL BLOOD PRESSURE LESS THAN 140/90: ICD-10-CM

## 2025-08-04 DIAGNOSIS — E78.5 HYPERLIPIDEMIA LDL GOAL <100: ICD-10-CM

## 2025-08-04 DIAGNOSIS — E11.9 TYPE 2 DIABETES MELLITUS WITHOUT COMPLICATION, WITHOUT LONG-TERM CURRENT USE OF INSULIN (HCC): Primary | ICD-10-CM

## 2025-08-04 DIAGNOSIS — F51.01 PRIMARY INSOMNIA: ICD-10-CM

## 2025-08-18 RX ORDER — BLOOD SUGAR DIAGNOSTIC
STRIP MISCELLANEOUS
Qty: 100 EACH | Refills: 0 | Status: SHIPPED | OUTPATIENT
Start: 2025-08-18

## 2025-08-25 ENCOUNTER — TELEPHONE (OUTPATIENT)
Facility: CLINIC | Age: 80
End: 2025-08-25

## 2025-08-26 ENCOUNTER — PHARMACY VISIT (OUTPATIENT)
Facility: CLINIC | Age: 80
End: 2025-08-26

## 2025-08-26 DIAGNOSIS — E11.9 TYPE 2 DIABETES MELLITUS WITHOUT COMPLICATION, WITHOUT LONG-TERM CURRENT USE OF INSULIN (HCC): Primary | ICD-10-CM

## 2025-08-26 RX ORDER — GLIMEPIRIDE 2 MG/1
2 TABLET ORAL 2 TIMES DAILY
Qty: 180 TABLET | Refills: 3 | Status: SHIPPED | OUTPATIENT
Start: 2025-08-26